# Patient Record
Sex: FEMALE | Race: WHITE | Employment: OTHER | URBAN - METROPOLITAN AREA
[De-identification: names, ages, dates, MRNs, and addresses within clinical notes are randomized per-mention and may not be internally consistent; named-entity substitution may affect disease eponyms.]

---

## 2017-11-01 ENCOUNTER — HOSPITAL ENCOUNTER (OUTPATIENT)
Dept: DIABETES SERVICES | Age: 72
Discharge: HOME | End: 2017-11-01
Attending: FAMILY MEDICINE
Payer: MEDICARE

## 2017-11-01 DIAGNOSIS — E11.9 DIABETES MELLITUS WITHOUT COMPLICATION (HCC): ICD-10-CM

## 2017-11-01 PROCEDURE — G0109 DIAB MANAGE TRN IND/GROUP: HCPCS | Performed by: DIETITIAN, REGISTERED

## 2017-11-15 ENCOUNTER — HOSPITAL ENCOUNTER (OUTPATIENT)
Dept: DIABETES SERVICES | Age: 72
Discharge: HOME | End: 2017-11-15
Payer: MEDICARE

## 2017-11-15 DIAGNOSIS — E11.9 DIABETES MELLITUS WITHOUT COMPLICATION (HCC): ICD-10-CM

## 2017-11-15 PROCEDURE — G0109 DIAB MANAGE TRN IND/GROUP: HCPCS | Performed by: DIETITIAN, REGISTERED

## 2017-12-19 ENCOUNTER — HOSPITAL ENCOUNTER (OUTPATIENT)
Dept: DIABETES SERVICES | Age: 72
Discharge: HOME | End: 2017-12-19
Payer: MEDICARE

## 2017-12-19 DIAGNOSIS — E11.9 DIABETES MELLITUS WITHOUT COMPLICATION (HCC): ICD-10-CM

## 2017-12-19 PROCEDURE — G0109 DIAB MANAGE TRN IND/GROUP: HCPCS | Performed by: DIETITIAN, REGISTERED

## 2019-08-28 ENCOUNTER — HOSPITAL ENCOUNTER (OUTPATIENT)
Dept: MRI IMAGING | Age: 74
Discharge: HOME | End: 2019-08-28
Attending: FAMILY MEDICINE
Payer: MEDICARE

## 2019-08-28 DIAGNOSIS — R51.9 NONINTRACTABLE EPISODIC HEADACHE, UNSPECIFIED HEADACHE TYPE: ICD-10-CM

## 2019-08-28 PROCEDURE — 70551 MRI BRAIN STEM W/O DYE: CPT

## 2019-09-05 ENCOUNTER — HOSPITAL ENCOUNTER (OUTPATIENT)
Dept: CT IMAGING | Age: 74
Discharge: HOME | End: 2019-09-05
Attending: FAMILY MEDICINE
Payer: MEDICARE

## 2019-09-05 DIAGNOSIS — Z87.891 PERSONAL HISTORY OF TOBACCO USE, PRESENTING HAZARDS TO HEALTH: ICD-10-CM

## 2019-09-05 PROCEDURE — G0297 LDCT FOR LUNG CA SCREEN: HCPCS

## 2019-11-19 ENCOUNTER — HOSPITAL ENCOUNTER (OUTPATIENT)
Dept: VASCULAR SURGERY | Age: 74
Discharge: HOME | End: 2019-11-19
Attending: INTERNAL MEDICINE
Payer: MEDICARE

## 2019-11-19 DIAGNOSIS — E08.40 DIABETES MELLITUS DUE TO UNDERLYING CONDITION WITH DIABETIC NEUROPATHY (HCC): ICD-10-CM

## 2019-11-19 DIAGNOSIS — R23.2 FLUSHING: ICD-10-CM

## 2019-11-19 DIAGNOSIS — I70.213 ATHEROSCLEROSIS OF NATIVE ARTERIES OF EXTREMITIES WITH INTERMITTENT CLAUDICATION, BILATERAL LEGS (HCC): ICD-10-CM

## 2019-11-19 LAB
LEFT CCA DIST DIAS: 21.7 CM/S
LEFT CCA DIST SYS: 95.8 CM/S
LEFT CCA PROX DIAS: 18.3 CM/S
LEFT CCA PROX SYS: 113.1 CM/S
LEFT ECA DIAS: 14.8 CM/S
LEFT ECA SYS: 114.8 CM/S
LEFT ICA DIST DIAS: 21.7 CM/S
LEFT ICA DIST SYS: 106.6 CM/S
LEFT ICA MID DIAS: 21.2 CM/S
LEFT ICA MID SYS: 103.4 CM/S
LEFT ICA PROX DIAS: 25.2 CM/S
LEFT ICA PROX SYS: 85.5 CM/S
LEFT ICA/CCA SYS: 1.1
LEFT VERTEBRAL DIAS: 12.7 CM/S
LEFT VERTEBRAL SYS: 62.2 CM/S
RIGHT CCA DIST DIAS: 19.2 CM/S
RIGHT CCA DIST SYS: 90.4 CM/S
RIGHT CCA PROX DIAS: 13.7 CM/S
RIGHT CCA PROX SYS: 106 CM/S
RIGHT ECA DIAS: 10.2 CM/S
RIGHT ECA SYS: 112.5 CM/S
RIGHT ICA DIST DIAS: 16.2 CM/S
RIGHT ICA DIST SYS: 77.7 CM/S
RIGHT ICA MID DIAS: 24.1 CM/S
RIGHT ICA MID SYS: 79.3 CM/S
RIGHT ICA PROX DIAS: 16.7 CM/S
RIGHT ICA PROX SYS: 73.2 CM/S
RIGHT ICA/CCA SYS: 0.9
RIGHT VERTEBRAL DIAS: 11 CM/S
RIGHT VERTEBRAL SYS: 67 CM/S

## 2019-11-19 PROCEDURE — 93880 EXTRACRANIAL BILAT STUDY: CPT

## 2019-12-12 ENCOUNTER — APPOINTMENT (OUTPATIENT)
Dept: GENERAL RADIOLOGY | Age: 74
End: 2019-12-12
Attending: EMERGENCY MEDICINE
Payer: MEDICARE

## 2019-12-12 ENCOUNTER — HOSPITAL ENCOUNTER (EMERGENCY)
Age: 74
Discharge: HOME | End: 2019-12-12
Attending: EMERGENCY MEDICINE
Payer: MEDICARE

## 2019-12-12 VITALS
SYSTOLIC BLOOD PRESSURE: 138 MMHG | BODY MASS INDEX: 31.21 KG/M2 | WEIGHT: 170.64 LBS | HEART RATE: 80 BPM | RESPIRATION RATE: 21 BRPM | TEMPERATURE: 98 F | DIASTOLIC BLOOD PRESSURE: 68 MMHG | OXYGEN SATURATION: 99 %

## 2019-12-12 DIAGNOSIS — R07.89 CHEST WALL PAIN: Primary | ICD-10-CM

## 2019-12-12 LAB
ALBUMIN SERPL-MCNC: 3.6 G/DL (ref 3.5–5)
ALBUMIN/GLOB SERPL: 1.1 {RATIO} (ref 1.1–2.2)
ALP SERPL-CCNC: 156 U/L (ref 45–117)
ALT SERPL-CCNC: 24 U/L (ref 12–78)
ANION GAP SERPL CALC-SCNC: 12 MMOL/L (ref 5–15)
APPEARANCE UR: CLEAR
AST SERPL-CCNC: 13 U/L (ref 15–37)
BACTERIA URNS QL MICRO: NEGATIVE /HPF
BASOPHILS # BLD: 0 K/UL (ref 0–0.1)
BASOPHILS NFR BLD: 0 % (ref 0–1)
BILIRUB SERPL-MCNC: 0.2 MG/DL (ref 0.2–1)
BILIRUB UR QL: NEGATIVE
BUN SERPL-MCNC: 13 MG/DL (ref 6–20)
BUN/CREAT SERPL: 21 (ref 12–20)
CALCIUM SERPL-MCNC: 8.9 MG/DL (ref 8.5–10.1)
CHLORIDE SERPL-SCNC: 92 MMOL/L (ref 97–108)
CK SERPL-CCNC: 69 U/L (ref 26–192)
CO2 SERPL-SCNC: 25 MMOL/L (ref 21–32)
COLOR UR: ABNORMAL
COMMENT, HOLDF: NORMAL
CREAT SERPL-MCNC: 0.61 MG/DL (ref 0.55–1.02)
DIFFERENTIAL METHOD BLD: ABNORMAL
EOSINOPHIL # BLD: 0 K/UL (ref 0–0.4)
EOSINOPHIL NFR BLD: 0 % (ref 0–7)
EPITH CASTS URNS QL MICRO: ABNORMAL /LPF
ERYTHROCYTE [DISTWIDTH] IN BLOOD BY AUTOMATED COUNT: 18.5 % (ref 11.5–14.5)
GLOBULIN SER CALC-MCNC: 3.4 G/DL (ref 2–4)
GLUCOSE BLD STRIP.AUTO-MCNC: 189 MG/DL (ref 65–100)
GLUCOSE SERPL-MCNC: 165 MG/DL (ref 65–100)
GLUCOSE UR STRIP.AUTO-MCNC: NEGATIVE MG/DL
HCT VFR BLD AUTO: 28.8 % (ref 35–47)
HGB BLD-MCNC: 8.3 G/DL (ref 11.5–16)
HGB UR QL STRIP: NEGATIVE
IMM GRANULOCYTES # BLD AUTO: 0 K/UL (ref 0–0.04)
IMM GRANULOCYTES NFR BLD AUTO: 0 % (ref 0–0.5)
KETONES UR QL STRIP.AUTO: ABNORMAL MG/DL
LEUKOCYTE ESTERASE UR QL STRIP.AUTO: NEGATIVE
LYMPHOCYTES # BLD: 1 K/UL (ref 0.8–3.5)
LYMPHOCYTES NFR BLD: 10 % (ref 12–49)
MCH RBC QN AUTO: 19.7 PG (ref 26–34)
MCHC RBC AUTO-ENTMCNC: 28.8 G/DL (ref 30–36.5)
MCV RBC AUTO: 68.2 FL (ref 80–99)
MONOCYTES # BLD: 0.4 K/UL (ref 0–1)
MONOCYTES NFR BLD: 4 % (ref 5–13)
NEUTS SEG # BLD: 8.7 K/UL (ref 1.8–8)
NEUTS SEG NFR BLD: 86 % (ref 32–75)
NITRITE UR QL STRIP.AUTO: NEGATIVE
NRBC # BLD: 0 K/UL (ref 0–0.01)
NRBC BLD-RTO: 0 PER 100 WBC
PH UR STRIP: 7.5 [PH] (ref 5–8)
PLATELET # BLD AUTO: 273 K/UL (ref 150–400)
PLATELET COMMENTS,PCOM: ABNORMAL
PMV BLD AUTO: 10.3 FL (ref 8.9–12.9)
POTASSIUM SERPL-SCNC: 4 MMOL/L (ref 3.5–5.1)
PROT SERPL-MCNC: 7 G/DL (ref 6.4–8.2)
PROT UR STRIP-MCNC: NEGATIVE MG/DL
RBC # BLD AUTO: 4.22 M/UL (ref 3.8–5.2)
RBC #/AREA URNS HPF: ABNORMAL /HPF (ref 0–5)
RBC MORPH BLD: ABNORMAL
SAMPLES BEING HELD,HOLD: NORMAL
SERVICE CMNT-IMP: ABNORMAL
SODIUM SERPL-SCNC: 129 MMOL/L (ref 136–145)
SP GR UR REFRACTOMETRY: 1.01 (ref 1–1.03)
TROPONIN I SERPL-MCNC: <0.05 NG/ML
UA: UC IF INDICATED,UAUC: ABNORMAL
UROBILINOGEN UR QL STRIP.AUTO: 0.2 EU/DL (ref 0.2–1)
WBC # BLD AUTO: 10.1 K/UL (ref 3.6–11)
WBC URNS QL MICRO: ABNORMAL /HPF (ref 0–4)

## 2019-12-12 PROCEDURE — 85025 COMPLETE CBC W/AUTO DIFF WBC: CPT

## 2019-12-12 PROCEDURE — 80053 COMPREHEN METABOLIC PANEL: CPT

## 2019-12-12 PROCEDURE — 96374 THER/PROPH/DIAG INJ IV PUSH: CPT

## 2019-12-12 PROCEDURE — 82962 GLUCOSE BLOOD TEST: CPT

## 2019-12-12 PROCEDURE — 74011250637 HC RX REV CODE- 250/637: Performed by: EMERGENCY MEDICINE

## 2019-12-12 PROCEDURE — 74011000250 HC RX REV CODE- 250: Performed by: EMERGENCY MEDICINE

## 2019-12-12 PROCEDURE — 81001 URINALYSIS AUTO W/SCOPE: CPT

## 2019-12-12 PROCEDURE — 74011250636 HC RX REV CODE- 250/636: Performed by: EMERGENCY MEDICINE

## 2019-12-12 PROCEDURE — 84484 ASSAY OF TROPONIN QUANT: CPT

## 2019-12-12 PROCEDURE — 99285 EMERGENCY DEPT VISIT HI MDM: CPT

## 2019-12-12 PROCEDURE — 71046 X-RAY EXAM CHEST 2 VIEWS: CPT

## 2019-12-12 PROCEDURE — 93005 ELECTROCARDIOGRAM TRACING: CPT

## 2019-12-12 PROCEDURE — 36415 COLL VENOUS BLD VENIPUNCTURE: CPT

## 2019-12-12 PROCEDURE — 82550 ASSAY OF CK (CPK): CPT

## 2019-12-12 RX ORDER — LIDOCAINE 50 MG/G
1 PATCH TOPICAL EVERY 24 HOURS
Status: DISCONTINUED | OUTPATIENT
Start: 2019-12-12 | End: 2019-12-12 | Stop reason: HOSPADM

## 2019-12-12 RX ORDER — KETOROLAC TROMETHAMINE 30 MG/ML
15 INJECTION, SOLUTION INTRAMUSCULAR; INTRAVENOUS
Status: COMPLETED | OUTPATIENT
Start: 2019-12-12 | End: 2019-12-12

## 2019-12-12 RX ORDER — LIDOCAINE 50 MG/G
PATCH TOPICAL
Qty: 10 EACH | Refills: 0 | Status: SHIPPED | OUTPATIENT
Start: 2019-12-12 | End: 2020-09-04

## 2019-12-12 RX ORDER — OXYCODONE AND ACETAMINOPHEN 5; 325 MG/1; MG/1
1 TABLET ORAL
Status: COMPLETED | OUTPATIENT
Start: 2019-12-12 | End: 2019-12-12

## 2019-12-12 RX ADMIN — OXYCODONE HYDROCHLORIDE AND ACETAMINOPHEN 1 TABLET: 5; 325 TABLET ORAL at 16:33

## 2019-12-12 RX ADMIN — KETOROLAC TROMETHAMINE 15 MG: 30 INJECTION, SOLUTION INTRAMUSCULAR at 14:52

## 2019-12-12 NOTE — ED TRIAGE NOTES
TRIAGE NOTE: Sudden onset of dizziness, laid herself down on the floor, and couldn't get up for 3.5 hours  Has sharp pain on left chest, worse with movement, states didn't fall or hurt herself earlier.

## 2019-12-12 NOTE — DISCHARGE INSTRUCTIONS

## 2019-12-12 NOTE — ED PROVIDER NOTES
57-year-old female history of bipolar disorder, diabetes, presenting to the ED for evaluation of severe left-sided chest and rib pain. Patient reports she started having pain under her left breast and rib cage earlier this morning. She began to feel very nauseated and thought that she might pass out, so she laid herself on the ground which she thought would be cold and percussion. She was unable to get back up from the ground secondary to pain and remained there for 3 hours. Her left-sided chest pain is sharp, worse with movement and palpation, slightly worse with inspiration. She denies any shortness of breath. She's had no syncope. sshe denies any current nausea or diaphoresis. She has no cardiac history he denies any history of recent trauma or falls.            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    Carotid bruit 4/12/2011    Chest discomfort 4/12/2011    Diabetes (HCC)     GERD (gastroesophageal reflux disease)     Other ill-defined conditions(799.89)     celiac disease    Psychiatric disorder     depression/anxiety    Pure hypercholesterolemia 4/12/2011       Past Surgical History:   Procedure Laterality Date    CARDIAC SURG PROCEDURE UNLIST      cardiac cath - normal    HX APPENDECTOMY      HX GYN      tubal ligation    HX GYN      cyst removed from ovary at same time as appendectomy    HX HEENT      skin cancer removed from ear    HX HEENT      sinus surgery    HX HEENT      bilateral cataracts removed    HX ORTHOPAEDIC      back    HX OTHER SURGICAL      colonoscopy x 1 - hx colon polyps    HX WOOD AND BSO           Family History:   Problem Relation Age of Onset    Breast Cancer Sister     Colon Polyps Other         2 cousins       Social History     Socioeconomic History    Marital status:      Spouse name: Not on file    Number of children: Not on file    Years of education: Not on file    Highest education level: Not on file   Occupational History    Not on file   Social Needs    Financial resource strain: Not on file    Food insecurity:     Worry: Not on file     Inability: Not on file    Transportation needs:     Medical: Not on file     Non-medical: Not on file   Tobacco Use    Smoking status: Current Every Day Smoker     Packs/day: 0.25     Years: 30.00     Pack years: 7.50    Smokeless tobacco: Never Used    Tobacco comment: current cigarette smoker   Substance and Sexual Activity    Alcohol use: Yes     Comment: occasional    Drug use: Not on file    Sexual activity: Not on file   Lifestyle    Physical activity:     Days per week: Not on file     Minutes per session: Not on file    Stress: Not on file   Relationships    Social connections:     Talks on phone: Not on file     Gets together: Not on file     Attends [employer] service: Not on file     Active member of club or organization: Not on file     Attends meetings of clubs or organizations: Not on file     Relationship status: Not on file    Intimate partner violence:     Fear of current or ex partner: Not on file     Emotionally abused: Not on file     Physically abused: Not on file     Forced sexual activity: Not on file   Other Topics Concern    Not on file   Social History Narrative    Not on file         ALLERGIES: Codeine; Lactose intolerance [lactase]; Morphine; and Sulfa (sulfonamide antibiotics)    Review of Systems   Constitutional: Negative for chills and fever. HENT: Negative for congestion. Eyes: Negative for visual disturbance. Respiratory: Negative for shortness of breath. Cardiovascular: Positive for chest pain. Negative for palpitations and leg swelling. Gastrointestinal: Positive for nausea. Negative for abdominal pain and vomiting. Genitourinary: Negative for dysuria and hematuria. Musculoskeletal: Negative for back pain. Skin: Negative for rash.    Allergic/Immunologic: Negative for immunocompromised state. Neurological: Positive for light-headedness. Negative for syncope, weakness and headaches. Psychiatric/Behavioral: Negative for confusion. Vitals:    12/12/19 1432   Weight: 77.4 kg (170 lb 10.2 oz)            Physical Exam  Vitals signs and nursing note reviewed. Constitutional:       General: She is not in acute distress. Appearance: She is well-developed. HENT:      Head: Normocephalic and atraumatic. Eyes:      General: No scleral icterus. Neck:      Trachea: No tracheal deviation. Cardiovascular:      Rate and Rhythm: Normal rate and regular rhythm. Heart sounds: Normal heart sounds. Pulmonary:      Effort: Pulmonary effort is normal. No respiratory distress. Breath sounds: Normal breath sounds. Chest:       Abdominal:      General: There is no distension. Palpations: Abdomen is soft. Tenderness: There is no tenderness. Musculoskeletal: Normal range of motion. Skin:     General: Skin is warm and dry. Neurological:      Mental Status: She is alert and oriented to person, place, and time. Cranial Nerves: No cranial nerve deficit. MDM  Number of Diagnoses or Management Options  Diagnosis management comments: 66-year-old female history of bipolar disorder and diabetes, presenting with left-sided chest pain, atypical in nature. Also a 3 hour episode where she was started on the ground. We'll evaluate for ACS with EKG, troponin. HEART score 3, low risk. Chest x-ray to assess for rib fracture, pneumothorax or possible pleural effusion as cause of left-sided pain. There is no abdominal pain, as will defer  Those labs at this time. Amount and/or Complexity of Data Reviewed  Clinical lab tests: ordered  Tests in the radiology section of CPT®: ordered        ED EKG interpretation:  Rhythm: normal sinus rhythm; and regular .  Rate (approx.): 81; Axis: normal; P wave: normal; QRS interval: normal ; ST/T wave: Q waves in V1, V2; in  Lead: V1 qwave and V2 qwave; Other findings: ischemia V1, V2, likely subacute, no prior for comparison. EKG documented by Marielle Westfall MD, as interpreted by Lizbeth Harmon MD, ED MD.    Recent Results (from the past 12 hour(s))   EKG, 12 LEAD, INITIAL    Collection Time: 12/12/19  2:35 PM   Result Value Ref Range    Ventricular Rate 81 BPM    Atrial Rate 81 BPM    P-R Interval 168 ms    QRS Duration 74 ms    Q-T Interval 384 ms    QTC Calculation (Bezet) 446 ms    Calculated P Axis 49 degrees    Calculated R Axis 20 degrees    Calculated T Axis 71 degrees    Diagnosis       Normal sinus rhythm  Possible Left atrial enlargement  Septal infarct , age undetermined  Abnormal ECG  No previous ECGs available     SAMPLES BEING HELD    Collection Time: 12/12/19  2:43 PM   Result Value Ref Range    SAMPLES BEING HELD 1RD,1BLU     COMMENT        Add-on orders for these samples will be processed based on acceptable specimen integrity and analyte stability, which may vary by analyte. TROPONIN I    Collection Time: 12/12/19  2:43 PM   Result Value Ref Range    Troponin-I, Qt. <0.05 <4.96 ng/mL   METABOLIC PANEL, COMPREHENSIVE    Collection Time: 12/12/19  2:43 PM   Result Value Ref Range    Sodium 129 (L) 136 - 145 mmol/L    Potassium 4.0 3.5 - 5.1 mmol/L    Chloride 92 (L) 97 - 108 mmol/L    CO2 25 21 - 32 mmol/L    Anion gap 12 5 - 15 mmol/L    Glucose 165 (H) 65 - 100 mg/dL    BUN 13 6 - 20 MG/DL    Creatinine 0.61 0.55 - 1.02 MG/DL    BUN/Creatinine ratio 21 (H) 12 - 20      GFR est AA >60 >60 ml/min/1.73m2    GFR est non-AA >60 >60 ml/min/1.73m2    Calcium 8.9 8.5 - 10.1 MG/DL    Bilirubin, total 0.2 0.2 - 1.0 MG/DL    ALT (SGPT) 24 12 - 78 U/L    AST (SGOT) 13 (L) 15 - 37 U/L    Alk.  phosphatase 156 (H) 45 - 117 U/L    Protein, total 7.0 6.4 - 8.2 g/dL    Albumin 3.6 3.5 - 5.0 g/dL    Globulin 3.4 2.0 - 4.0 g/dL    A-G Ratio 1.1 1.1 - 2.2     CBC WITH AUTOMATED DIFF    Collection Time: 12/12/19  2:43 PM Result Value Ref Range    WBC 10.1 3.6 - 11.0 K/uL    RBC 4.22 3.80 - 5.20 M/uL    HGB 8.3 (L) 11.5 - 16.0 g/dL    HCT 28.8 (L) 35.0 - 47.0 %    MCV 68.2 (L) 80.0 - 99.0 FL    MCH 19.7 (L) 26.0 - 34.0 PG    MCHC 28.8 (L) 30.0 - 36.5 g/dL    RDW 18.5 (H) 11.5 - 14.5 %    PLATELET 624 351 - 890 K/uL    MPV 10.3 8.9 - 12.9 FL    NRBC 0.0 0  WBC    ABSOLUTE NRBC 0.00 0.00 - 0.01 K/uL    NEUTROPHILS 86 (H) 32 - 75 %    LYMPHOCYTES 10 (L) 12 - 49 %    MONOCYTES 4 (L) 5 - 13 %    EOSINOPHILS 0 0 - 7 %    BASOPHILS 0 0 - 1 %    IMMATURE GRANULOCYTES 0 0.0 - 0.5 %    ABS. NEUTROPHILS 8.7 (H) 1.8 - 8.0 K/UL    ABS. LYMPHOCYTES 1.0 0.8 - 3.5 K/UL    ABS. MONOCYTES 0.4 0.0 - 1.0 K/UL    ABS. EOSINOPHILS 0.0 0.0 - 0.4 K/UL    ABS. BASOPHILS 0.0 0.0 - 0.1 K/UL    ABS. IMM. GRANS. 0.0 0.00 - 0.04 K/UL    DF SMEAR SCANNED      PLATELET COMMENTS Large Platelets      RBC COMMENTS ANISOCYTOSIS  2+        RBC COMMENTS HYPOCHROMIA  3+        RBC COMMENTS MICROCYTOSIS  2+        RBC COMMENTS OVALOCYTES  PRESENT       CK    Collection Time: 12/12/19  2:43 PM   Result Value Ref Range    CK 69 26 - 192 U/L   GLUCOSE, POC    Collection Time: 12/12/19  2:52 PM   Result Value Ref Range    Glucose (POC) 189 (H) 65 - 100 mg/dL    Performed by Yessy Stewart      CT Results  (Last 48 hours)    None        PFT Results  (Last 48 hours)    None        Echo Results  (Last 48 hours)    None        CXR Results  (Last 48 hours)               12/12/19 1512  XR CHEST PA LAT Final result    Impression:  IMPRESSION: Low lung volumes with mild bibasilar opacities likely representing   atelectasis. Narrative:  EXAM:  XR CHEST PA LAT       INDICATION: Left-sided chest and rib pain. COMPARISON: 7/16/2009. TECHNIQUE: Frontal and lateral chest views       FINDINGS: Cardiac monitoring wires overlie the thorax. The cardiomediastinal   contours are within normal limits. There are low lung volumes with mild   bibasilar opacities. There is no pleural effusion or pneumothorax. The bones and   upper abdomen are stable. VENOUS DOPPLER results  (Last 48 hours)    None        4:19 PM  Labs and imaging unremarkable. Pt requesting percocet for pain. Will administer and discharge home. Procedures     4:53 PM  Pain is improved with Percocet. Patient plans to stay with her daughter tonight and understands she can return for any reason as needed. I've advised her to take ibuprofen at home for pain. 5:47 PM  Daughter is at bedside. We had an extensive conversation on why narcotics and benzos are contraindicated in geriatric patients due to fall risks. Will apply lidocaine patch. Daughter has 4% patches at home. Will follow-up with PCP/GI.      Signed By: Jennifer Landis MD     December 12, 2019

## 2019-12-14 LAB
ATRIAL RATE: 81 BPM
CALCULATED P AXIS, ECG09: 49 DEGREES
CALCULATED R AXIS, ECG10: 20 DEGREES
CALCULATED T AXIS, ECG11: 71 DEGREES
DIAGNOSIS, 93000: NORMAL
P-R INTERVAL, ECG05: 168 MS
Q-T INTERVAL, ECG07: 384 MS
QRS DURATION, ECG06: 74 MS
QTC CALCULATION (BEZET), ECG08: 446 MS
VENTRICULAR RATE, ECG03: 81 BPM

## 2020-09-04 ENCOUNTER — HOSPITAL ENCOUNTER (OUTPATIENT)
Age: 75
Setting detail: OBSERVATION
Discharge: HOME | End: 2020-09-05
Attending: STUDENT IN AN ORGANIZED HEALTH CARE EDUCATION/TRAINING PROGRAM | Admitting: FAMILY MEDICINE
Payer: MEDICARE

## 2020-09-04 DIAGNOSIS — D64.9 SYMPTOMATIC ANEMIA: Primary | ICD-10-CM

## 2020-09-04 DIAGNOSIS — E87.1 HYPONATREMIA: ICD-10-CM

## 2020-09-04 DIAGNOSIS — D50.9 MICROCYTIC ANEMIA: ICD-10-CM

## 2020-09-04 LAB
ALBUMIN SERPL-MCNC: 3.3 G/DL (ref 3.5–5)
ALBUMIN/GLOB SERPL: 0.9 {RATIO} (ref 1.1–2.2)
ALP SERPL-CCNC: 151 U/L (ref 45–117)
ALT SERPL-CCNC: 21 U/L (ref 12–78)
ANION GAP SERPL CALC-SCNC: 10 MMOL/L (ref 5–15)
AST SERPL-CCNC: 23 U/L (ref 15–37)
ATRIAL RATE: 80 BPM
BASOPHILS # BLD: 0 K/UL (ref 0–0.1)
BASOPHILS NFR BLD: 0 % (ref 0–1)
BILIRUB SERPL-MCNC: 0.2 MG/DL (ref 0.2–1)
BUN SERPL-MCNC: 11 MG/DL (ref 6–20)
BUN/CREAT SERPL: 14 (ref 12–20)
CALCIUM SERPL-MCNC: 9.1 MG/DL (ref 8.5–10.1)
CALCULATED P AXIS, ECG09: 52 DEGREES
CALCULATED R AXIS, ECG10: 23 DEGREES
CALCULATED T AXIS, ECG11: 71 DEGREES
CHLORIDE SERPL-SCNC: 92 MMOL/L (ref 97–108)
CO2 SERPL-SCNC: 26 MMOL/L (ref 21–32)
CREAT SERPL-MCNC: 0.8 MG/DL (ref 0.55–1.02)
D DIMER PPP FEU-MCNC: 0.35 MG/L FEU (ref 0–0.65)
DIAGNOSIS, 93000: NORMAL
DIFFERENTIAL METHOD BLD: ABNORMAL
EOSINOPHIL # BLD: 0 K/UL (ref 0–0.4)
EOSINOPHIL NFR BLD: 0 % (ref 0–7)
ERYTHROCYTE [DISTWIDTH] IN BLOOD BY AUTOMATED COUNT: 20.1 % (ref 11.5–14.5)
EST. AVERAGE GLUCOSE BLD GHB EST-MCNC: 174 MG/DL
GLOBULIN SER CALC-MCNC: 3.6 G/DL (ref 2–4)
GLUCOSE BLD STRIP.AUTO-MCNC: 118 MG/DL (ref 65–100)
GLUCOSE BLD STRIP.AUTO-MCNC: 136 MG/DL (ref 65–100)
GLUCOSE SERPL-MCNC: 152 MG/DL (ref 65–100)
HBA1C MFR BLD: 7.7 % (ref 4–5.6)
HCT VFR BLD AUTO: 24.9 % (ref 35–47)
HEMOCCULT STL QL: NEGATIVE
HGB BLD-MCNC: 6 G/DL (ref 11.5–16)
HGB BLD-MCNC: 6.5 G/DL (ref 11.5–16)
IMM GRANULOCYTES # BLD AUTO: 0.1 K/UL (ref 0–0.04)
IMM GRANULOCYTES NFR BLD AUTO: 1 % (ref 0–0.5)
LYMPHOCYTES # BLD: 1.6 K/UL (ref 0.8–3.5)
LYMPHOCYTES NFR BLD: 18 % (ref 12–49)
MCH RBC QN AUTO: 15.8 PG (ref 26–34)
MCHC RBC AUTO-ENTMCNC: 26.1 G/DL (ref 30–36.5)
MCV RBC AUTO: 60.4 FL (ref 80–99)
MONOCYTES # BLD: 0.7 K/UL (ref 0–1)
MONOCYTES NFR BLD: 8 % (ref 5–13)
NEUTS SEG # BLD: 6.6 K/UL (ref 1.8–8)
NEUTS SEG NFR BLD: 73 % (ref 32–75)
NRBC # BLD: 0 K/UL (ref 0–0.01)
NRBC BLD-RTO: 0 PER 100 WBC
OSMOLALITY SERPL: 268 MOSM/KG H2O
P-R INTERVAL, ECG05: 158 MS
PLATELET # BLD AUTO: 302 K/UL (ref 150–400)
PLATELET COMMENTS,PCOM: ABNORMAL
PMV BLD AUTO: 10 FL (ref 8.9–12.9)
POTASSIUM SERPL-SCNC: 3.7 MMOL/L (ref 3.5–5.1)
PROT SERPL-MCNC: 6.9 G/DL (ref 6.4–8.2)
Q-T INTERVAL, ECG07: 358 MS
QRS DURATION, ECG06: 68 MS
QTC CALCULATION (BEZET), ECG08: 412 MS
RBC # BLD AUTO: 4.12 M/UL (ref 3.8–5.2)
RBC MORPH BLD: ABNORMAL
SERVICE CMNT-IMP: ABNORMAL
SERVICE CMNT-IMP: ABNORMAL
SODIUM SERPL-SCNC: 128 MMOL/L (ref 136–145)
TROPONIN I SERPL-MCNC: <0.05 NG/ML
VENTRICULAR RATE, ECG03: 80 BPM
VIT B12 SERPL-MCNC: 479 PG/ML (ref 193–986)
WBC # BLD AUTO: 9 K/UL (ref 3.6–11)

## 2020-09-04 PROCEDURE — 36415 COLL VENOUS BLD VENIPUNCTURE: CPT

## 2020-09-04 PROCEDURE — 36430 TRANSFUSION BLD/BLD COMPNT: CPT

## 2020-09-04 PROCEDURE — 85018 HEMOGLOBIN: CPT

## 2020-09-04 PROCEDURE — 83036 HEMOGLOBIN GLYCOSYLATED A1C: CPT

## 2020-09-04 PROCEDURE — 99284 EMERGENCY DEPT VISIT MOD MDM: CPT

## 2020-09-04 PROCEDURE — P9016 RBC LEUKOCYTES REDUCED: HCPCS

## 2020-09-04 PROCEDURE — 82962 GLUCOSE BLOOD TEST: CPT

## 2020-09-04 PROCEDURE — 84484 ASSAY OF TROPONIN QUANT: CPT

## 2020-09-04 PROCEDURE — 82272 OCCULT BLD FECES 1-3 TESTS: CPT

## 2020-09-04 PROCEDURE — 82607 VITAMIN B-12: CPT

## 2020-09-04 PROCEDURE — C9113 INJ PANTOPRAZOLE SODIUM, VIA: HCPCS | Performed by: FAMILY MEDICINE

## 2020-09-04 PROCEDURE — 99218 HC RM OBSERVATION: CPT

## 2020-09-04 PROCEDURE — 83930 ASSAY OF BLOOD OSMOLALITY: CPT

## 2020-09-04 PROCEDURE — 85379 FIBRIN DEGRADATION QUANT: CPT

## 2020-09-04 PROCEDURE — 80053 COMPREHEN METABOLIC PANEL: CPT

## 2020-09-04 PROCEDURE — 86923 COMPATIBILITY TEST ELECTRIC: CPT

## 2020-09-04 PROCEDURE — 85025 COMPLETE CBC W/AUTO DIFF WBC: CPT

## 2020-09-04 PROCEDURE — 74011250636 HC RX REV CODE- 250/636: Performed by: FAMILY MEDICINE

## 2020-09-04 PROCEDURE — 93005 ELECTROCARDIOGRAM TRACING: CPT

## 2020-09-04 PROCEDURE — 96374 THER/PROPH/DIAG INJ IV PUSH: CPT

## 2020-09-04 PROCEDURE — 74011000250 HC RX REV CODE- 250: Performed by: FAMILY MEDICINE

## 2020-09-04 PROCEDURE — 86900 BLOOD TYPING SEROLOGIC ABO: CPT

## 2020-09-04 RX ORDER — SODIUM CHLORIDE 9 MG/ML
75 INJECTION, SOLUTION INTRAVENOUS CONTINUOUS
Status: DISCONTINUED | OUTPATIENT
Start: 2020-09-04 | End: 2020-09-05 | Stop reason: HOSPADM

## 2020-09-04 RX ORDER — METFORMIN HYDROCHLORIDE 500 MG/1
500 TABLET ORAL 2 TIMES DAILY WITH MEALS
COMMUNITY

## 2020-09-04 RX ORDER — SODIUM CHLORIDE 0.9 % (FLUSH) 0.9 %
5-40 SYRINGE (ML) INJECTION AS NEEDED
Status: DISCONTINUED | OUTPATIENT
Start: 2020-09-04 | End: 2020-09-05 | Stop reason: HOSPADM

## 2020-09-04 RX ORDER — HYDROCHLOROTHIAZIDE 25 MG/1
25 TABLET ORAL DAILY
COMMUNITY

## 2020-09-04 RX ORDER — UREA 10 %
100 LOTION (ML) TOPICAL DAILY
COMMUNITY

## 2020-09-04 RX ORDER — MAGNESIUM SULFATE 100 %
4 CRYSTALS MISCELLANEOUS AS NEEDED
Status: DISCONTINUED | OUTPATIENT
Start: 2020-09-04 | End: 2020-09-05 | Stop reason: HOSPADM

## 2020-09-04 RX ORDER — DEXTROSE MONOHYDRATE 100 MG/ML
0-250 INJECTION, SOLUTION INTRAVENOUS AS NEEDED
Status: DISCONTINUED | OUTPATIENT
Start: 2020-09-04 | End: 2020-09-05 | Stop reason: HOSPADM

## 2020-09-04 RX ORDER — CALCIUM CARBONATE/VITAMIN D3 600 MG-125
TABLET ORAL
COMMUNITY

## 2020-09-04 RX ORDER — GLIPIZIDE 5 MG/1
5 TABLET, FILM COATED, EXTENDED RELEASE ORAL DAILY
COMMUNITY

## 2020-09-04 RX ORDER — SODIUM CHLORIDE 9 MG/ML
250 INJECTION, SOLUTION INTRAVENOUS AS NEEDED
Status: DISCONTINUED | OUTPATIENT
Start: 2020-09-04 | End: 2020-09-05 | Stop reason: HOSPADM

## 2020-09-04 RX ORDER — SODIUM CHLORIDE 0.9 % (FLUSH) 0.9 %
5-40 SYRINGE (ML) INJECTION EVERY 8 HOURS
Status: DISCONTINUED | OUTPATIENT
Start: 2020-09-04 | End: 2020-09-05 | Stop reason: HOSPADM

## 2020-09-04 RX ORDER — GABAPENTIN 300 MG/1
300 CAPSULE ORAL 3 TIMES DAILY
COMMUNITY

## 2020-09-04 RX ORDER — EZETIMIBE 10 MG/1
10 TABLET ORAL DAILY
COMMUNITY
End: 2021-09-15

## 2020-09-04 RX ORDER — GLUCOSAMINE/CHONDR SU A SOD 750-600 MG
2500 TABLET ORAL DAILY
COMMUNITY

## 2020-09-04 RX ORDER — NYSTATIN 100000 [USP'U]/ML
1 SUSPENSION ORAL 4 TIMES DAILY
COMMUNITY

## 2020-09-04 RX ORDER — DOXEPIN HYDROCHLORIDE 150 MG/1
150 CAPSULE ORAL
COMMUNITY

## 2020-09-04 RX ORDER — INSULIN LISPRO 100 [IU]/ML
INJECTION, SOLUTION INTRAVENOUS; SUBCUTANEOUS EVERY 6 HOURS
Status: DISCONTINUED | OUTPATIENT
Start: 2020-09-04 | End: 2020-09-05 | Stop reason: HOSPADM

## 2020-09-04 RX ORDER — OXCARBAZEPINE 300 MG/1
300 TABLET, FILM COATED ORAL DAILY
COMMUNITY

## 2020-09-04 RX ORDER — ZINC GLUCONATE 10 MG
500 LOZENGE ORAL DAILY
COMMUNITY

## 2020-09-04 RX ADMIN — SODIUM CHLORIDE 75 ML/HR: 9 INJECTION, SOLUTION INTRAVENOUS at 17:45

## 2020-09-04 RX ADMIN — Medication 10 ML: at 17:46

## 2020-09-04 RX ADMIN — SODIUM CHLORIDE 40 MG: 9 INJECTION, SOLUTION INTRAMUSCULAR; INTRAVENOUS; SUBCUTANEOUS at 18:35

## 2020-09-04 RX ADMIN — Medication 10 ML: at 21:11

## 2020-09-04 NOTE — ED PROVIDER NOTES
This is a 78-year-old female with history of bipolar disorder, GERD, and diabetes who presents as a referral from her PCP due to low hemoglobin level that was discovered on lab draw 2 days ago. Reported to be 6.8 on September 2, 2020. Patient was seen that day by her PCP for a complaint of fatigue and malaise. Patient has been experiencing the symptoms for the past 5 to 6 weeks. She reports exhaustion, tiredness, dyspnea on exertion, muscle aches and pains, and dizziness. States she might of had a syncopal episode 3 weeks ago but none since then. Also reports intermittent, dark stools, last occurred 3 weeks ago. Denies ever being diagnosed with anemia, although she does report needing vitamin B12 injections occasionally and that she is a strict vegetarian. Reports 10 pound weight loss in the past 5 weeks. Also reports that she was tested for coronavirus 3 weeks ago which was negative. She takes a baby aspirin daily as well as an Aleve at night, no excessive use reported. Denies anticoagulation. Denies any reji melena, hematochezia, hematemesis, vaginal bleeding, hematuria. No other complaints today. Brief chart review reveals an ED visit in December 2019, hemoglobin at that time was 8.3. Fatigue   Associated symptoms include nausea. Pertinent negatives include no shortness of breath, no chest pain, no vomiting and no headaches.         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    Carotid bruit 4/12/2011    Chest discomfort 4/12/2011    Diabetes (HCC)     GERD (gastroesophageal reflux disease)     Other ill-defined conditions(799.89)     celiac disease    Psychiatric disorder     depression/anxiety    Pure hypercholesterolemia 4/12/2011       Past Surgical History:   Procedure Laterality Date    CARDIAC SURG PROCEDURE UNLIST      cardiac cath - normal    HX APPENDECTOMY      HX GYN      tubal ligation    HX GYN      cyst removed from ovary at same time as appendectomy    HX HEENT      skin cancer removed from ear    HX HEENT      sinus surgery    HX HEENT      bilateral cataracts removed    HX ORTHOPAEDIC      back    HX OTHER SURGICAL      colonoscopy x 1 - hx colon polyps    HX WOOD AND BSO           Family History:   Problem Relation Age of Onset    Breast Cancer Sister     Colon Polyps Other         2 cousins       Social History     Socioeconomic History    Marital status:      Spouse name: Not on file    Number of children: Not on file    Years of education: Not on file    Highest education level: Not on file   Occupational History    Not on file   Social Needs    Financial resource strain: Not on file    Food insecurity     Worry: Not on file     Inability: Not on file    Transportation needs     Medical: Not on file     Non-medical: Not on file   Tobacco Use    Smoking status: Current Every Day Smoker     Packs/day: 0.50     Years: 30.00     Pack years: 15.00    Smokeless tobacco: Never Used    Tobacco comment: current cigarette smoker   Substance and Sexual Activity    Alcohol use: Yes     Comment: occasional    Drug use: Never    Sexual activity: Not on file   Lifestyle    Physical activity     Days per week: Not on file     Minutes per session: Not on file    Stress: Not on file   Relationships    Social connections     Talks on phone: Not on file     Gets together: Not on file     Attends Sikh service: Not on file     Active member of club or organization: Not on file     Attends meetings of clubs or organizations: Not on file     Relationship status: Not on file    Intimate partner violence     Fear of current or ex partner: Not on file     Emotionally abused: Not on file     Physically abused: Not on file     Forced sexual activity: Not on file   Other Topics Concern    Not on file   Social History Narrative    Not on file         ALLERGIES: Codeine;  Lactose intolerance [lactase]; Morphine; and Sulfa (sulfonamide antibiotics)    Review of Systems   Constitutional: Positive for activity change, appetite change, fatigue and unexpected weight change. Negative for chills. Respiratory: Negative for cough and shortness of breath. Cardiovascular: Negative for chest pain. Gastrointestinal: Positive for nausea. Negative for abdominal pain, anal bleeding, blood in stool and vomiting. Genitourinary: Negative for dysuria and vaginal bleeding. Musculoskeletal: Positive for myalgias. Skin: Negative for color change. Neurological: Positive for dizziness. Negative for syncope and headaches. All other systems reviewed and are negative. Vitals:    09/04/20 1016   BP: 162/62   Pulse: 81   Resp: 16   Temp: 98.2 °F (36.8 °C)   SpO2: 100%   Weight: 74.3 kg (163 lb 12.8 oz)   Height: 5' 1\" (1.549 m)            Physical Exam  Vitals signs and nursing note reviewed. Constitutional:       General: She is not in acute distress. Appearance: She is well-developed. She is obese. HENT:      Head: Normocephalic and atraumatic. Eyes:      General: No scleral icterus. Conjunctiva/sclera: Conjunctivae normal.   Neck:      Musculoskeletal: Normal range of motion and neck supple. Cardiovascular:      Rate and Rhythm: Normal rate and regular rhythm. Pulmonary:      Effort: Pulmonary effort is normal. No respiratory distress. Abdominal:      Palpations: Abdomen is soft. Tenderness: There is no abdominal tenderness. There is no guarding. Genitourinary:     Rectum: Normal.      Comments: No BRPR or melena  Musculoskeletal:         General: No deformity. Right lower leg: No edema. Skin:     General: Skin is dry. Coloration: Skin is pale. Neurological:      Mental Status: She is alert and oriented to person, place, and time. Cranial Nerves: No dysarthria. [facility]       Procedures      Assessment and plan:  This is a 42-year-old female discovered to have severe anemia, symptoms have been present for the past 5 to 6 weeks. Unclear cause. Differential diagnosis includes occult GI bleeding, iron deficiency, anemia of chronic disease, medication adverse reaction. Plan to recheck hemoglobin level today and guaiac stool. Will likely require admission for blood transfusion and further work-up given that she is symptomatic. Perfect Serve Consult for Admission  10:52 AM    ED Room Number: SER06/06  Patient Name and age:  Lima Ortega 66 y.o.  female  Working Diagnosis:   1. Symptomatic anemia    2. Microcytic anemia    3. Hyponatremia        COVID-19 Suspicion:  no  Sepsis present:  no  Reassessment needed: no  Code Status:  Full Code  Readmission: no  Isolation Requirements:  no  Recommended Level of Care:  med/surg  Department:[facility] - [phone]  Other:  Will need pRBC transfusion, further labs. Hemodynamically stable. Guaiac negative. EKG interpretation: 11:01  Rhythm: sinus rhythm with sinus arrhythmia; and irregular. Rate (approx.): 004;  Axis: normal; Intervals: normal ; ST/T wave: non-specific changes, no STEMI, septal q waves noted age undetermined; EKG documented and interpreted by Ilda Philip MD, ED MD.

## 2020-09-04 NOTE — H&P
History & Physical    Primary Care Provider: Zonia Bloch, MD  Source of Information: Patient     History of Presenting Illness:   Capri Hoyt is a 66 y.o. female who presents with low hemoglobin    Patient was a transfer from short [facility] ER    Patient reports that she has been having some fatigue and malaise for the past few weeks. Went to her PCP on 2 September 2020, was found to have a hemoglobin of 6.8, and was told to come to the ER. Patient reports that she feels quite weak, almost exhausted, having some shortness of breath, muscle aches, and dizziness. Patient reports that she has observed some dark stools off and on, reports that she had B12 injection in the past for her weakness. Patient reports that she has not had dark stools for the last week or so. Patient reports that she has lost about 10 pounds in the last 5 weeks, reports that she has poor appetite. Patient reports that she had a COVID-19 test done about 3 weeks back which was negative. Patient reports that she takes a baby aspirin on a daily basis. Patient was found to have a hemoglobin of 6.5 at [facility] ED and was transferred to [facility] for further management and evaluation. Patient denies any other complaints or problems. The patient denies any Headache, blurry vision, sore throat, trouble swallowing, trouble with speech, chest pain, SOB, cough, fever, chills, N/V/D, abd pain, urinary symptoms, constipation, recent travels, sick contacts, focal or generalized oneirological symptoms,  falls, injuries, rashes, contact with COVID-19 diagnosed patients, hematemesis, melena, hemoptysis, hematuria, rashes, denies starting any new medications and denies any other concerns or problems besides as mentioned above. Review of Systems:  A comprehensive review of systems was negative except for that written in the History of Present Illness.      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    Carotid bruit 4/12/2011    Chest discomfort 4/12/2011    Diabetes (HCC)     GERD (gastroesophageal reflux disease)     Other ill-defined conditions(799.89)     celiac disease    Psychiatric disorder     depression/anxiety    Pure hypercholesterolemia 4/12/2011      Past Surgical History:   Procedure Laterality Date    CARDIAC SURG PROCEDURE UNLIST      cardiac cath - normal    HX APPENDECTOMY      HX GYN      tubal ligation    HX GYN      cyst removed from ovary at same time as appendectomy    HX HEENT      skin cancer removed from ear    HX HEENT      sinus surgery    HX HEENT      bilateral cataracts removed    HX ORTHOPAEDIC      back    HX OTHER SURGICAL      colonoscopy x 1 - hx colon polyps    HX WOOD AND BSO       Prior to Admission medications    Medication Sig Start Date End Date Taking? Authorizing Provider   doxepin (SINEquan) 150 mg capsule Take 150 mg by mouth nightly. Yes Skyler, MD Roxann   magnesium 250 mg tab Take 500 mg by mouth daily. Yes Skyler, MD Roxann   calcium-cholecalciferol, d3, (CALCIUM 600 + D) 600-125 mg-unit tab Take  by mouth. Yes Skyler, MD Roxann   Biotin 2,500 mcg cap Take 2,500 mcg by mouth daily. Yes Skyler, MD Roxann   cyanocobalamin (Vitamin B-12) 100 mcg tablet Take 100 mcg by mouth daily. Yes Roxann Holland MD   OXcarbazepine (TRILEPTAL) 300 mg tablet Take 300 mg by mouth daily. Yes Skyler, MD Roxann   gabapentin (Neurontin) 300 mg capsule Take 300 mg by mouth three (3) times daily. Yes Roxann Holland MD   metFORMIN (GLUCOPHAGE) 500 mg tablet Take 500 mg by mouth two (2) times daily (with meals). Yes Skyler, MD Roxann   glipiZIDE SR (GLUCOTROL XL) 5 mg CR tablet Take 5 mg by mouth daily. Yes Skyler, MD Roxann   hydroCHLOROthiazide (HYDRODIURIL) 25 mg tablet Take 25 mg by mouth daily.    Yes Skyler, MD Roxann   nystatin (MYCOSTATIN) 100,000 unit/mL suspension Take 1 tsp by mouth four (4) times daily. swish and spit   Yes Other, MD Roxann   ezetimibe (ZETIA) 10 mg tablet Take 10 mg by mouth daily. Yes Other, MD Roxann   NAPROXEN SODIUM (ALEVE PO) Take  by mouth as needed. Takes one po twice a day as needed    Yes Provider, Historical   aspirin delayed-release (ASPIR-81) 81 mg tablet Take 81 mg by mouth daily. Yes Provider, Historical   omeprazole (PRILOSEC) 20 mg capsule Take 20 mg by mouth two (2) times daily as needed.  4/12/11  Yes Provider, Historical     Allergies   Allergen Reactions    Codeine Other (comments)     constipation    Lactose Intolerance [Lactase] Swelling     Constipation/swelling from under breast down    Morphine Nausea and Vomiting    Sulfa (Sulfonamide Antibiotics) Hives      Family History   Problem Relation Age of Onset    Breast Cancer Sister     Colon Polyps Other         2 cousins        SOCIAL HISTORY:  Patient resides:  Independently x   Assisted Living    SNF    With family care       Smoking history:   None    Former    Chronic x     Alcohol history:   None    Social x   Chronic      Ambulates:   Independently x   w/cane    w/walker    w/wc    CODE STATUS:  DNR    Full x   Other      Objective:     Physical Exam:     Visit Vitals  /64 (BP 1 Location: Right arm, BP Patient Position: At rest;Lying right side)   Pulse 76   Temp 97.5 °F (36.4 °C)   Resp 18   Ht 5' 1\" (1.549 m)   Wt 74.3 kg (163 lb 12.8 oz)   SpO2 99%   BMI 30.95 kg/m²      O2 Device: Room air    General : alert x 3, awake, no acute distress, resting in bed, pleasant female, appears to be stated age  HEENT: PEERL, EOMI, moist mucus membrane, TM clear  Neck: supple, no JVD, no meningeal signs  Chest: Clear to auscultation bilaterally   CVS: S1 S2 heard, Capillary refill less than 2 seconds, 2/6 systolic murmur at RUSB  Abd: soft/ Non tender, non distended, BS physiological,   Ext: no clubbing, no cyanosis, no edema, brisk 2+ DP pulses  Neuro/Psych: pleasant mood and affect, CN 2-12 grossly intact, sensory grossly within normal limit, Strength 5/5 in all extremities, DTR 1+ x 4  Skin: warm      EKG:  Sinus arrhythmias with non specific ST changes  . Data Review:     Recent Days:  Recent Labs     09/04/20  1022   WBC 9.0   HGB 6.5*   HCT 24.9*        Recent Labs     09/04/20  1022   *   K 3.7   CL 92*   CO2 26   *   BUN 11   CREA 0.80   CA 9.1   ALB 3.3*   ALT 21     No results for input(s): PH, PCO2, PO2, HCO3, FIO2 in the last 72 hours. 24 Hour Results:  Recent Results (from the past 24 hour(s))   CBC WITH AUTOMATED DIFF    Collection Time: 09/04/20 10:22 AM   Result Value Ref Range    WBC 9.0 3.6 - 11.0 K/uL    RBC 4.12 3.80 - 5.20 M/uL    HGB 6.5 (L) 11.5 - 16.0 g/dL    HCT 24.9 (L) 35.0 - 47.0 %    MCV 60.4 (L) 80.0 - 99.0 FL    MCH 15.8 (L) 26.0 - 34.0 PG    MCHC 26.1 (L) 30.0 - 36.5 g/dL    RDW 20.1 (H) 11.5 - 14.5 %    PLATELET 825 040 - 303 K/uL    MPV 10.0 8.9 - 12.9 FL    NRBC 0.0 0  WBC    ABSOLUTE NRBC 0.00 0.00 - 0.01 K/uL    NEUTROPHILS 73 32 - 75 %    LYMPHOCYTES 18 12 - 49 %    MONOCYTES 8 5 - 13 %    EOSINOPHILS 0 0 - 7 %    BASOPHILS 0 0 - 1 %    IMMATURE GRANULOCYTES 1 (H) 0.0 - 0.5 %    ABS. NEUTROPHILS 6.6 1.8 - 8.0 K/UL    ABS. LYMPHOCYTES 1.6 0.8 - 3.5 K/UL    ABS. MONOCYTES 0.7 0.0 - 1.0 K/UL    ABS. EOSINOPHILS 0.0 0.0 - 0.4 K/UL    ABS. BASOPHILS 0.0 0.0 - 0.1 K/UL    ABS. IMM.  GRANS. 0.1 (H) 0.00 - 0.04 K/UL    DF SMEAR SCANNED      PLATELET COMMENTS Large Platelets      RBC COMMENTS ANISOCYTOSIS  1+        RBC COMMENTS OVALOCYTES  PRESENT        RBC COMMENTS MICROCYTOSIS  2+        RBC COMMENTS HYPOCHROMIA  3+       METABOLIC PANEL, COMPREHENSIVE    Collection Time: 09/04/20 10:22 AM   Result Value Ref Range    Sodium 128 (L) 136 - 145 mmol/L    Potassium 3.7 3.5 - 5.1 mmol/L    Chloride 92 (L) 97 - 108 mmol/L    CO2 26 21 - 32 mmol/L    Anion gap 10 5 - 15 mmol/L    Glucose 152 (H) 65 - 100 mg/dL    BUN 11 6 - 20 MG/DL    Creatinine 0.80 0.55 - 1.02 MG/DL    BUN/Creatinine ratio 14 12 - 20      GFR est AA >60 >60 ml/min/1.73m2    GFR est non-AA >60 >60 ml/min/1.73m2    Calcium 9.1 8.5 - 10.1 MG/DL    Bilirubin, total 0.2 0.2 - 1.0 MG/DL    ALT (SGPT) 21 12 - 78 U/L    AST (SGOT) 23 15 - 37 U/L    Alk. phosphatase 151 (H) 45 - 117 U/L    Protein, total 6.9 6.4 - 8.2 g/dL    Albumin 3.3 (L) 3.5 - 5.0 g/dL    Globulin 3.6 2.0 - 4.0 g/dL    A-G Ratio 0.9 (L) 1.1 - 2.2     OCCULT BLOOD, STOOL    Collection Time: 09/04/20 10:42 AM   Result Value Ref Range    Occult blood, stool Negative NEG     EKG, 12 LEAD, INITIAL    Collection Time: 09/04/20 11:01 AM   Result Value Ref Range    Ventricular Rate 80 BPM    Atrial Rate 80 BPM    P-R Interval 158 ms    QRS Duration 68 ms    Q-T Interval 358 ms    QTC Calculation (Bezet) 412 ms    Calculated P Axis 52 degrees    Calculated R Axis 23 degrees    Calculated T Axis 71 degrees    Diagnosis       Sinus rhythm with marked sinus arrhythmia  Septal infarct (cited on or before 12-DEC-2019)  Abnormal ECG  When compared with ECG of 12-DEC-2019 14:35,  No significant change was found  Confirmed by Warren Batista MD ([id number]) on 9/4/2020 3:33:26 PM           Imaging:   No results found.   Assessment:     Acute microcytic anemia: Concern for a GI bleed, observe patient on a telemetry bed, Protonix twice daily, stool occult was negative but patient may have slow GI bleed, GI consult, transfuse 1 unit PRBC, Protonix twice daily, ferritin, iron panel, may consider getting a hematology consult, monitor H&H and transfuse further as needed, further intervention per hospital course, reassess as needed    Hyponatremia: Gentle IV hydration with normal saline,  Monitor sodium levels, repeat BMP in the morning, neurochecks, supportive care and close monitoring, further intervention per hospital course, check FENa, reassess as needed    Diabetes mellitus type 2: Patient will be on sliding scale NovoLog insulin, Accu-Cheks, diet control, close monitoring, further intervention per hospital course, reassess as needed    History of bipolar disorder: Resume home medications once acute issues are resolved      Hyperlipidemia: Hold statin for tonight    GI DVT prophylaxis: Patient will be on SCDs                       Signed By: Brad Jaimes MD     September 4, 2020

## 2020-09-04 NOTE — ED TRIAGE NOTES
Pt sent here by her PCP for a low hemoglobin level of 6.8. pt states she has been feeling weak, tired, dizzines and sob over the past 5 weeks.

## 2020-09-05 VITALS
WEIGHT: 163.58 LBS | OXYGEN SATURATION: 99 % | RESPIRATION RATE: 18 BRPM | HEART RATE: 69 BPM | HEIGHT: 61 IN | TEMPERATURE: 99.3 F | DIASTOLIC BLOOD PRESSURE: 30 MMHG | SYSTOLIC BLOOD PRESSURE: 145 MMHG | BODY MASS INDEX: 30.88 KG/M2

## 2020-09-05 LAB
ABO + RH BLD: NORMAL
ANION GAP SERPL CALC-SCNC: 8 MMOL/L (ref 5–15)
BLD PROD TYP BPU: NORMAL
BLOOD GROUP ANTIBODIES SERPL: NORMAL
BPU ID: NORMAL
BUN SERPL-MCNC: 6 MG/DL (ref 6–20)
BUN/CREAT SERPL: 13 (ref 12–20)
CALCIUM SERPL-MCNC: 8.9 MG/DL (ref 8.5–10.1)
CHLORIDE SERPL-SCNC: 100 MMOL/L (ref 97–108)
CO2 SERPL-SCNC: 25 MMOL/L (ref 21–32)
CREAT SERPL-MCNC: 0.48 MG/DL (ref 0.55–1.02)
CROSSMATCH RESULT,%XM: NORMAL
FERRITIN SERPL-MCNC: 3 NG/ML (ref 26–388)
GLUCOSE BLD STRIP.AUTO-MCNC: 164 MG/DL (ref 65–100)
GLUCOSE BLD STRIP.AUTO-MCNC: 283 MG/DL (ref 65–100)
GLUCOSE SERPL-MCNC: 134 MG/DL (ref 65–100)
HGB BLD-MCNC: 7.9 G/DL (ref 11.5–16)
IRON SATN MFR SERPL: 7 % (ref 20–50)
IRON SERPL-MCNC: 35 UG/DL (ref 35–150)
POTASSIUM SERPL-SCNC: 4.4 MMOL/L (ref 3.5–5.1)
SERVICE CMNT-IMP: ABNORMAL
SERVICE CMNT-IMP: ABNORMAL
SODIUM SERPL-SCNC: 133 MMOL/L (ref 136–145)
SPECIMEN EXP DATE BLD: NORMAL
STATUS OF UNIT,%ST: NORMAL
TIBC SERPL-MCNC: 537 UG/DL (ref 250–450)
TROPONIN I SERPL-MCNC: <0.05 NG/ML
UNIT DIVISION, %UDIV: 0

## 2020-09-05 PROCEDURE — 99218 HC RM OBSERVATION: CPT

## 2020-09-05 PROCEDURE — 96375 TX/PRO/DX INJ NEW DRUG ADDON: CPT

## 2020-09-05 PROCEDURE — 36415 COLL VENOUS BLD VENIPUNCTURE: CPT

## 2020-09-05 PROCEDURE — 82962 GLUCOSE BLOOD TEST: CPT

## 2020-09-05 PROCEDURE — 83540 ASSAY OF IRON: CPT

## 2020-09-05 PROCEDURE — 74011000258 HC RX REV CODE- 258: Performed by: HOSPITALIST

## 2020-09-05 PROCEDURE — 74011250636 HC RX REV CODE- 250/636: Performed by: FAMILY MEDICINE

## 2020-09-05 PROCEDURE — 96376 TX/PRO/DX INJ SAME DRUG ADON: CPT

## 2020-09-05 PROCEDURE — 74011636637 HC RX REV CODE- 636/637: Performed by: FAMILY MEDICINE

## 2020-09-05 PROCEDURE — 80048 BASIC METABOLIC PNL TOTAL CA: CPT

## 2020-09-05 PROCEDURE — 74011250636 HC RX REV CODE- 250/636: Performed by: HOSPITALIST

## 2020-09-05 PROCEDURE — 85018 HEMOGLOBIN: CPT

## 2020-09-05 PROCEDURE — 84484 ASSAY OF TROPONIN QUANT: CPT

## 2020-09-05 PROCEDURE — 94760 N-INVAS EAR/PLS OXIMETRY 1: CPT

## 2020-09-05 PROCEDURE — 74011000250 HC RX REV CODE- 250: Performed by: FAMILY MEDICINE

## 2020-09-05 PROCEDURE — 82728 ASSAY OF FERRITIN: CPT

## 2020-09-05 PROCEDURE — C9113 INJ PANTOPRAZOLE SODIUM, VIA: HCPCS | Performed by: FAMILY MEDICINE

## 2020-09-05 RX ORDER — FERROUS GLUCONATE 324(37.5)
324 TABLET ORAL
Qty: 60 TAB | Refills: 0 | Status: SHIPPED | OUTPATIENT
Start: 2020-09-05 | End: 2020-10-05

## 2020-09-05 RX ORDER — PANTOPRAZOLE SODIUM 40 MG/1
40 TABLET, DELAYED RELEASE ORAL 2 TIMES DAILY
Qty: 60 TAB | Refills: 0 | Status: SHIPPED | OUTPATIENT
Start: 2020-09-05 | End: 2020-10-05

## 2020-09-05 RX ADMIN — Medication 10 ML: at 05:54

## 2020-09-05 RX ADMIN — IRON SUCROSE 100 MG: 20 INJECTION, SOLUTION INTRAVENOUS at 09:13

## 2020-09-05 RX ADMIN — INSULIN LISPRO 3 UNITS: 100 INJECTION, SOLUTION INTRAVENOUS; SUBCUTANEOUS at 11:38

## 2020-09-05 RX ADMIN — SODIUM CHLORIDE 40 MG: 9 INJECTION, SOLUTION INTRAMUSCULAR; INTRAVENOUS; SUBCUTANEOUS at 05:54

## 2020-09-05 NOTE — PROGRESS NOTES
08:51 Spoke with Dr. Chang Gave concerning patient's NPO status and plan of care. Patient H&H now 7.9 and NPO; He advised us to page Dr. Vinny Burton or the on Call for Gastroenterology as Dr. Vinny Burton saw her in the ED. Paged Dr. Vinny Burton and advised Dr. Jacinto Cagle is on call. Dr. Chang Gave advised until then it is ok to give sips of water. Linda Alvares    08:56 Dr Jacinto Cagle  Returned call and will come see the patient.  Linda Alvares

## 2020-09-05 NOTE — DISCHARGE SUMMARY
Discharge Summary       PATIENT ID: Chalino Olson  MRN: [id number]   YOB: 1945    DATE OF ADMISSION: 9/4/2020 10:10 AM    DATE OF DISCHARGE: 9/5/20   PRIMARY CARE PROVIDER: Sintia Reyes MD     ATTENDING PHYSICIAN: Francheska Piper  DISCHARGING PROVIDER: Javy Desai MD    To contact this individual call [phone] and ask the  to page. If unavailable ask to be transferred the Adult Hospitalist Department. CONSULTATIONS: IP CONSULT TO GASTROENTEROLOGY    PROCEDURES/SURGERIES: * No surgery found *    ADMITTING DIAGNOSES & HOSPITAL COURSE:   Chalino Olson is a 66 y.o. female who presents with low hemoglobin        DISCHARGE DIAGNOSES / PLAN:      1. She was given BT hgb close to 8 and seen by GI , no acute need for EGD plan for out pt EGD /c olon per Dr. Ravinder Dos Santos after d/c pt given PPI bid and oral iron      ADDITIONAL CARE RECOMMENDATIONS:        PENDING TEST RESULTS:   At the time of discharge the following test results are still pending:     FOLLOW UP APPOINTMENTS:    Follow-up Information     Follow up With Specialties Details Why Contact Info    Sintia Reyes MD Family Medicine In 1 week need out pt EGD and colonoscopy [address]  [phone]      Lisa Foster MD Gastroenterology In 2 weeks  [address] [facility] [phone]               DIET: Regular Diet    ACTIVITY: Activity as tolerated    WOUND CARE:     EQUIPMENT needed:       DISCHARGE MEDICATIONS:  Current Discharge Medication List      START taking these medications    Details   pantoprazole (PROTONIX) 40 mg tablet Take 1 Tab by mouth two (2) times a day for 30 days. Qty: 60 Tab, Refills: 0      ferrous gluconate 324 mg (37.5 mg iron) tablet Take 1 Tab by mouth two (2) times daily (after meals) for 30 days.   Qty: 60 Tab, Refills: 0         CONTINUE these medications which have NOT CHANGED    Details   doxepin (SINEquan) 150 mg capsule Take 150 mg by mouth nightly.      magnesium 250 mg tab Take 500 mg by mouth daily. calcium-cholecalciferol, d3, (CALCIUM 600 + D) 600-125 mg-unit tab Take  by mouth. Biotin 2,500 mcg cap Take 2,500 mcg by mouth daily. cyanocobalamin (Vitamin B-12) 100 mcg tablet Take 100 mcg by mouth daily. gabapentin (Neurontin) 300 mg capsule Take 300 mg by mouth three (3) times daily. metFORMIN (GLUCOPHAGE) 500 mg tablet Take 500 mg by mouth two (2) times daily (with meals). glipiZIDE SR (GLUCOTROL XL) 5 mg CR tablet Take 5 mg by mouth daily. hydroCHLOROthiazide (HYDRODIURIL) 25 mg tablet Take 25 mg by mouth daily. nystatin (MYCOSTATIN) 100,000 unit/mL suspension Take 1 tsp by mouth four (4) times daily. swish and spit      ezetimibe (ZETIA) 10 mg tablet Take 10 mg by mouth daily. STOP taking these medications       NAPROXEN SODIUM (ALEVE PO) Comments:   Reason for Stopping:         aspirin delayed-release (ASPIR-81) 81 mg tablet Comments:   Reason for Stopping:         omeprazole (PRILOSEC) 20 mg capsule Comments:   Reason for Stopping:                 NOTIFY YOUR PHYSICIAN FOR ANY OF THE FOLLOWING:   Fever over 101 degrees for 24 hours. Chest pain, shortness of breath, fever, chills, nausea, vomiting, diarrhea, change in mentation, falling, weakness, bleeding. Severe pain or pain not relieved by medications. Or, any other signs or symptoms that you may have questions about. DISPOSITION:  x  Home With:   OT  PT  HH  RN       Long term SNF/Inpatient Rehab    Independent/assisted living    Hospice    Other:       PATIENT CONDITION AT DISCHARGE:     Functional status    Poor     Deconditioned    x Independent      Cognition   x  Lucid     Forgetful     Dementia      Catheters/lines (plus indication)    Asher     PICC     PEG    x None      Code status   x  Full code     DNR      PHYSICAL EXAMINATION AT DISCHARGE:  General:          Alert, cooperative, no distress, appears stated age. HEENT:           Atraumatic, anicteric sclerae, pink conjunctivae                          No oral ulcers, mucosa moist, throat clear, dentition fair  Neck:               Supple, symmetrical  Lungs:             Clear to auscultation bilaterally. No Wheezing or Rhonchi. No rales. Chest wall:      No tenderness  No Accessory muscle use. Heart:              Regular  rhythm,  No  murmur   No edema  Abdomen:        Soft, non-tender. Not distended. Bowel sounds normal  Extremities:     No cyanosis. No clubbing,                            Skin turgor normal, Capillary refill normal  Skin:                Not pale. Not Jaundiced  No rashes   Psych:             Not anxious or agitated.   Neurologic:      Alert, moves all extremities, answers questions appropriately and responds to commands       CHRONIC MEDICAL DIAGNOSES:  Problem List as of 9/5/2020 Never Reviewed          Codes Class Noted - Resolved    Anemia ICD-10-CM: D64.9  ICD-9-CM: 285.9  9/4/2020 - Present        Chest discomfort ICD-10-CM: R07.89  ICD-9-CM: 786.59  4/12/2011 - Present        Pure hypercholesterolemia ICD-10-CM: E78.00  ICD-9-CM: 272.0  4/12/2011 - Present        Carotid bruit ICD-10-CM: R09.89  ICD-9-CM: 785.9  4/12/2011 - Present              Greater than 20  minutes were spent with the patient on counseling and coordination of care    Signed:   Cornelius Magaña MD  9/5/2020  9:46 AM

## 2020-09-05 NOTE — PROGRESS NOTES
Problem: Falls - Risk of  Goal: *Absence of Falls  Description: Document Kelly Ac Fall Risk and appropriate interventions in the flowsheet. Outcome: Progressing Towards Goal  Note: Fall Risk Interventions:                                Problem: Patient Education: Go to Patient Education Activity  Goal: Patient/Family Education  Outcome: Progressing Towards Goal     Problem: Patient Education: Go to Patient Education Activity  Goal: Patient/Family Education  Outcome: Progressing Towards Goal     Problem: Upper and Lower GI Bleed: Day 1  Goal: Off Pathway (Use only if patient is Off Pathway)  Outcome: Progressing Towards Goal  Goal: Activity/Safety  Outcome: Progressing Towards Goal  Goal: Consults, if ordered  Outcome: Progressing Towards Goal  Goal: Diagnostic Test/Procedures  Outcome: Progressing Towards Goal  Goal: Nutrition/Diet  Outcome: Progressing Towards Goal  Goal: Discharge Planning  Outcome: Progressing Towards Goal  Goal: Medications  Outcome: Progressing Towards Goal  Goal: Respiratory  Outcome: Progressing Towards Goal  Goal: Treatments/Interventions/Procedures  Outcome: Progressing Towards Goal  Goal: Psychosocial  Outcome: Progressing Towards Goal  Goal: *Optimal pain control at patient's stated goal  Outcome: Progressing Towards Goal  Goal: *Hemodynamically stable  Outcome: Progressing Towards Goal  Goal: *Demonstrates progressive activity  Outcome: Progressing Towards Goal     Problem: Diabetes Self-Management  Goal: *Disease process and treatment process  Description: Define diabetes and identify own type of diabetes; list 3 options for treating diabetes. Outcome: Progressing Towards Goal  Goal: *Incorporating nutritional management into lifestyle  Description: Describe effect of type, amount and timing of food on blood glucose; list 3 methods for planning meals.   Outcome: Progressing Towards Goal  Goal: *Incorporating physical activity into lifestyle  Description: State effect of exercise on blood glucose levels. Outcome: Progressing Towards Goal  Goal: *Developing strategies to promote health/change behavior  Description: Define the ABC's of diabetes; identify appropriate screenings, schedule and personal plan for screenings. Outcome: Progressing Towards Goal  Goal: *Using medications safely  Description: State effect of diabetes medications on diabetes; name diabetes medication taking, action and side effects. Outcome: Progressing Towards Goal  Goal: *Monitoring blood glucose, interpreting and using results  Description: Identify recommended blood glucose targets  and personal targets. Outcome: Progressing Towards Goal  Goal: *Prevention, detection, treatment of acute complications  Description: List symptoms of hyper- and hypoglycemia; describe how to treat low blood sugar and actions for lowering  high blood glucose level. Outcome: Progressing Towards Goal  Goal: *Prevention, detection and treatment of chronic complications  Description: Define the natural course of diabetes and describe the relationship of blood glucose levels to long term complications of diabetes.   Outcome: Progressing Towards Goal  Goal: *Developing strategies to address psychosocial issues  Description: Describe feelings about living with diabetes; identify support needed and support network  Outcome: Progressing Towards Goal  Goal: *Insulin pump training  Outcome: Progressing Towards Goal  Goal: *Sick day guidelines  Outcome: Progressing Towards Goal  Goal: *Patient Specific Goal (EDIT GOAL, INSERT TEXT)  Outcome: Progressing Towards Goal     Problem: Patient Education: Go to Patient Education Activity  Goal: Patient/Family Education  Outcome: Progressing Towards Goal

## 2020-09-05 NOTE — PROGRESS NOTES
Problem: Falls - Risk of  Goal: *Absence of Falls  Description: Document James Vance Fall Risk and appropriate interventions in the flowsheet.   Outcome: Progressing Towards Goal  Note: Fall Risk Interventions:                                Problem: Patient Education: Go to Patient Education Activity  Goal: Patient/Family Education  Outcome: Progressing Towards Goal     Problem: Patient Education: Go to Patient Education Activity  Goal: Patient/Family Education  Outcome: Progressing Towards Goal     Problem: Upper and Lower GI Bleed: Day 1  Goal: Off Pathway (Use only if patient is Off Pathway)  Outcome: Progressing Towards Goal  Goal: Activity/Safety  Outcome: Progressing Towards Goal  Goal: Consults, if ordered  Outcome: Progressing Towards Goal  Goal: Diagnostic Test/Procedures  Outcome: Progressing Towards Goal  Goal: Nutrition/Diet  Outcome: Progressing Towards Goal  Goal: Discharge Planning  Outcome: Progressing Towards Goal  Goal: Medications  Outcome: Progressing Towards Goal  Goal: Respiratory  Outcome: Progressing Towards Goal  Goal: Treatments/Interventions/Procedures  Outcome: Progressing Towards Goal  Goal: Psychosocial  Outcome: Progressing Towards Goal  Goal: *Optimal pain control at patient's stated goal  Outcome: Progressing Towards Goal  Goal: *Hemodynamically stable  Outcome: Progressing Towards Goal  Goal: *Demonstrates progressive activity  Outcome: Progressing Towards Goal     Problem: Upper and Lower GI Bleed: Day 2  Goal: Off Pathway (Use only if patient is Off Pathway)  Outcome: Progressing Towards Goal  Goal: Activity/Safety  Outcome: Progressing Towards Goal  Goal: Consults, if ordered  Outcome: Progressing Towards Goal  Goal: Diagnostic Test/Procedures  Outcome: Progressing Towards Goal  Goal: Nutrition/Diet  Outcome: Progressing Towards Goal  Goal: Discharge Planning  Outcome: Progressing Towards Goal  Goal: Medications  Outcome: Progressing Towards Goal  Goal: Respiratory  Outcome: Progressing Towards Goal  Goal: Treatments/Interventions/Procedures  Outcome: Progressing Towards Goal  Goal: Psychosocial  Outcome: Progressing Towards Goal  Goal: *Optimal pain control at patient's stated goal  Outcome: Progressing Towards Goal  Goal: *Hemodynamically stable  Outcome: Progressing Towards Goal  Goal: *Tolerating diet  Outcome: Progressing Towards Goal  Goal: *Demonstrates progressive activity  Outcome: Progressing Towards Goal     Problem: Upper and Lower GI Bleed: Day 3  Goal: Off Pathway (Use only if patient is Off Pathway)  Outcome: Progressing Towards Goal  Goal: Activity/Safety  Outcome: Progressing Towards Goal  Goal: Diagnostic Test/Procedures  Outcome: Progressing Towards Goal  Goal: Nutrition/Diet  Outcome: Progressing Towards Goal  Goal: Discharge Planning  Outcome: Progressing Towards Goal  Goal: Medications  Outcome: Progressing Towards Goal  Goal: Treatments/Interventions/Procedures  Outcome: Progressing Towards Goal  Goal: Psychosocial  Outcome: Progressing Towards Goal     Problem: Upper and Lower GI Bleed:  Discharge Outcomes  Goal: *Hemodynamically stable  Outcome: Progressing Towards Goal  Goal: *Lungs clear or at baseline  Outcome: Progressing Towards Goal  Goal: *Demonstrates independent activity or return to baseline  Outcome: Progressing Towards Goal  Goal: *Pain is controlled to three or less  Outcome: Progressing Towards Goal  Goal: *Verbalizes understanding and describes prescribed diet  Outcome: Progressing Towards Goal  Goal: *Tolerating diet  Outcome: Progressing Towards Goal  Goal: *Verbalizes name, dosage, time, side effects, and number of days to continue medications  Outcome: Progressing Towards Goal  Goal: *Anxiety reduced or absent  Outcome: Progressing Towards Goal  Goal: *Understands and describes signs and symptoms to report to providers(Stroke Metric)  Outcome: Progressing Towards Goal  Goal: *Describes follow-up/return visits to physicians  Outcome: Progressing Towards Goal  Goal: *Describes available resources and support systems  Outcome: Progressing Towards Goal     Problem: Diabetes Self-Management  Goal: *Disease process and treatment process  Description: Define diabetes and identify own type of diabetes; list 3 options for treating diabetes. Outcome: Progressing Towards Goal  Goal: *Incorporating nutritional management into lifestyle  Description: Describe effect of type, amount and timing of food on blood glucose; list 3 methods for planning meals. Outcome: Progressing Towards Goal  Goal: *Incorporating physical activity into lifestyle  Description: State effect of exercise on blood glucose levels. Outcome: Progressing Towards Goal  Goal: *Developing strategies to promote health/change behavior  Description: Define the ABC's of diabetes; identify appropriate screenings, schedule and personal plan for screenings. Outcome: Progressing Towards Goal  Goal: *Using medications safely  Description: State effect of diabetes medications on diabetes; name diabetes medication taking, action and side effects. Outcome: Progressing Towards Goal  Goal: *Monitoring blood glucose, interpreting and using results  Description: Identify recommended blood glucose targets  and personal targets. Outcome: Progressing Towards Goal  Goal: *Prevention, detection, treatment of acute complications  Description: List symptoms of hyper- and hypoglycemia; describe how to treat low blood sugar and actions for lowering  high blood glucose level. Outcome: Progressing Towards Goal  Goal: *Prevention, detection and treatment of chronic complications  Description: Define the natural course of diabetes and describe the relationship of blood glucose levels to long term complications of diabetes.   Outcome: Progressing Towards Goal  Goal: *Developing strategies to address psychosocial issues  Description: Describe feelings about living with diabetes; identify support needed and support network  Outcome: Progressing Towards Goal  Goal: *Insulin pump training  Outcome: Progressing Towards Goal  Goal: *Sick day guidelines  Outcome: Progressing Towards Goal  Goal: *Patient Specific Goal (EDIT GOAL, INSERT TEXT)  Outcome: Progressing Towards Goal     Problem: Patient Education: Go to Patient Education Activity  Goal: Patient/Family Education  Outcome: Progressing Towards Goal

## 2020-09-05 NOTE — DISCHARGE INSTRUCTIONS
Discharge Instructions       PATIENT ID: Shey Soto  MRN: [id number]   YOB: 1945    DATE OF ADMISSION: 9/4/2020 10:10 AM    DATE OF DISCHARGE: 9/5/2020    PRIMARY CARE PROVIDER: Dat Carranza MD     ATTENDING PHYSICIAN: Vickey Vaz MD  DISCHARGING PROVIDER: Zaheer Aguilar MD    To contact this individual call [phone] and ask the  to page. If unavailable ask to be transferred the Adult Hospitalist Department. DISCHARGE DIAGNOSES GIB    CONSULTATIONS: IP CONSULT TO GASTROENTEROLOGY    PROCEDURES/SURGERIES: * No surgery found *    PENDING TEST RESULTS:   At the time of discharge the following test results are still pending:     FOLLOW UP APPOINTMENTS:   Follow-up Information     Follow up With Specialties Details Why Contact Info    Dat Carranza MD Family Medicine In 1 week need out pt EGD and colonoscopy [address]  [phone]      Yang Carvalho MD Gastroenterology In 2 weeks  [address] [facility] [phone]             ADDITIONAL CARE RECOMMENDATIONS: stop aspirin and naproxen for 2 weeks follow up with Dr. Anamika Alvarado for EGD and colonoscopy     DIET: Regular Diet    ACTIVITY: Activity as tolerated    WOUND CARE:     EQUIPMENT needed:       DISCHARGE MEDICATIONS:   See Medication Reconciliation Form    · It is important that you take the medication exactly as they are prescribed. · Keep your medication in the bottles provided by the pharmacist and keep a list of the medication names, dosages, and times to be taken in your wallet. · Do not take other medications without consulting your doctor. NOTIFY YOUR PHYSICIAN FOR ANY OF THE FOLLOWING:   Fever over 101 degrees for 24 hours. Chest pain, shortness of breath, fever, chills, nausea, vomiting, diarrhea, change in mentation, falling, weakness, bleeding. Severe pain or pain not relieved by medications.   Or, any other signs or symptoms that you may have questions about.       DISPOSITION:  x  Home With:   OT  PT  HH  RN       SNF/Inpatient Rehab/LTAC    Independent/assisted living    Hospice    Other:     CDMP Checked:   Yes x     PROBLEM LIST Updated:  Yes x       Signed:   Skipper Dance, MD  9/5/2020  9:45 AM

## 2020-09-05 NOTE — CONSULTS
[address]  [address] [facility], [address] [place] [address]       GASTROENTEROLOGY CONSULTATION NOTE        NAME:  Héctor Holbrook   :   1945   MRN:   [id number]           Consult Date: 2020 9:38 AM      History of Present Illness:  Patient is a 66 y.o. who is seen in consultation at the request of Dr. Danisha Herrera for anemia. She was admitted for DEANA, no GI complaints, last colonoscopy in  by Dr Haroon Broussard, her only c/o fatigue, got 1 unit of blood transfusion and getting iv iron, denies any melena, pain, nausea or vomiting, on PPI for GERD. 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    Carotid bruit 2011    Chest discomfort 2011    Diabetes (HCC)     GERD (gastroesophageal reflux disease)     Other ill-defined conditions(799.89)     celiac disease    Psychiatric disorder     depression/anxiety    Pure hypercholesterolemia 2011       PSH:  Past Surgical History:   Procedure Laterality Date    CARDIAC SURG PROCEDURE UNLIST      cardiac cath - normal    HX APPENDECTOMY      HX GYN      tubal ligation    HX GYN      cyst removed from ovary at same time as appendectomy    HX HEENT      skin cancer removed from ear    HX HEENT      sinus surgery    HX HEENT      bilateral cataracts removed    HX ORTHOPAEDIC      back    HX OTHER SURGICAL      colonoscopy x 1 - hx colon polyps    HX WOOD AND BSO         Allergies: Allergies   Allergen Reactions    Codeine Other (comments)     constipation    Lactose Intolerance [Lactase] Swelling     Constipation/swelling from under breast down    Morphine Nausea and Vomiting    Sulfa (Sulfonamide Antibiotics) Hives       Home Medications:  Prior to Admission Medications   Prescriptions Last Dose Informant Patient Reported? Taking? Biotin 2,500 mcg cap 2020 at Unknown time  Yes Yes   Sig: Take 2,500 mcg by mouth daily.    NAPROXEN SODIUM (ALEVE PO) 9/3/2020 at Unknown time  Yes Yes   Sig: Take  by mouth as needed. Takes one po twice a day as needed    OXcarbazepine (TRILEPTAL) 300 mg tablet 9/4/2020 at Unknown time  Yes Yes   Sig: Take 300 mg by mouth daily. aspirin delayed-release (ASPIR-81) 81 mg tablet 9/4/2020 at Unknown time  Yes Yes   Sig: Take 81 mg by mouth daily. calcium-cholecalciferol, d3, (CALCIUM 600 + D) 600-125 mg-unit tab 9/4/2020 at biotin  Yes Yes   Sig: Take  by mouth.   cyanocobalamin (Vitamin B-12) 100 mcg tablet 9/4/2020 at Unknown time  Yes Yes   Sig: Take 100 mcg by mouth daily. doxepin (SINEquan) 150 mg capsule 9/3/2020 at Unknown time  Yes Yes   Sig: Take 150 mg by mouth nightly.   ezetimibe (ZETIA) 10 mg tablet 9/4/2020 at Unknown time  Yes Yes   Sig: Take 10 mg by mouth daily. gabapentin (Neurontin) 300 mg capsule 9/4/2020 at Unknown time  Yes Yes   Sig: Take 300 mg by mouth three (3) times daily. glipiZIDE SR (GLUCOTROL XL) 5 mg CR tablet 9/4/2020 at Unknown time  Yes Yes   Sig: Take 5 mg by mouth daily. hydroCHLOROthiazide (HYDRODIURIL) 25 mg tablet 9/4/2020 at Unknown time  Yes Yes   Sig: Take 25 mg by mouth daily. magnesium 250 mg tab 9/4/2020 at Unknown time  Yes Yes   Sig: Take 500 mg by mouth daily. metFORMIN (GLUCOPHAGE) 500 mg tablet 9/4/2020 at Unknown time  Yes Yes   Sig: Take 500 mg by mouth two (2) times daily (with meals). nystatin (MYCOSTATIN) 100,000 unit/mL suspension 9/4/2020 at Unknown time  Yes Yes   Sig: Take 1 tsp by mouth four (4) times daily. swish and spit   omeprazole (PRILOSEC) 20 mg capsule 9/4/2020 at Unknown time  Yes Yes   Sig: Take 20 mg by mouth two (2) times daily as needed.       Facility-Administered Medications: None       Hospital Medications:  Current Facility-Administered Medications   Medication Dose Route Frequency    iron sucrose (VENOFER) 100 mg in 0.9% sodium chloride 100 mL IVPB  100 mg IntraVENous Q24H    sodium chloride (NS) flush 5-40 mL  5-40 mL IntraVENous Q8H    sodium chloride (NS) flush 5-40 mL  5-40 mL IntraVENous PRN    0.9% sodium chloride infusion  75 mL/hr IntraVENous CONTINUOUS    pantoprazole (PROTONIX) 40 mg in 0.9% sodium chloride 10 mL injection  40 mg IntraVENous Q12H    glucose chewable tablet 16 g  4 Tab Oral PRN    glucagon (GLUCAGEN) injection 1 mg  1 mg IntraMUSCular PRN    dextrose 10% infusion 0-250 mL  0-250 mL IntraVENous PRN    insulin lispro (HUMALOG) injection   SubCUTAneous Q6H    0.9% sodium chloride infusion 250 mL  250 mL IntraVENous PRN       Social History:  Social History     Tobacco Use    Smoking status: Current Every Day Smoker     Packs/day: 0.50     Years: 30.00     Pack years: 15.00    Smokeless tobacco: Never Used    Tobacco comment: current cigarette smoker   Substance Use Topics    Alcohol use: Yes     Comment: occasional       Family History:  Family History   Problem Relation Age of Onset    Breast Cancer Sister     Colon Polyps Other         2 cousins       Review of Systems:  Constitutional: negative fever, negative chills, negative weight loss  Eyes:   negative visual changes  ENT:   negative sore throat, tongue or lip swelling  Respiratory:  negative cough, negative dyspnea  Cards:  negative for chest pain, palpitations, lower extremity edema  GI:   See HPI  :  negative for frequency, dysuria  Integument:  negative for rash and pruritus  Heme:  negative for easy bruising and gum/nose bleeding  Musculoskel: negative for myalgias,  back pain and muscle weakness  Neuro: negative for headaches, dizziness, vertigo  Psych:  negative for feelings of anxiety, depression     Objective:     Patient Vitals for the past 8 hrs:   BP Temp Pulse Resp SpO2 Weight   09/05/20 0821 (!) 145/30 99.3 °F (37.4 °C) 69 18 99 %    09/05/20 0334 108/56 97.8 °F (36.6 °C) 79 18 99 % 74.2 kg (163 lb 9.3 oz)     No intake/output data recorded.   09/03 1901 - 09/05 0700  In: 269.6   Out: -     EXAM: NEURO-a&o   HEENT-wnl   LUNGS-clear    COR-regular rate and rhythym     ABD-soft , no tenderness, no rebound, good bs     EXT-no edema     Data Review     Recent Labs     09/05/20  0306 09/04/20  1747 09/04/20  1022   WBC  --   --  9.0   HGB 7.9* 6.0* 6.5*   HCT  --   --  24.9*   PLT  --   --  302     Recent Labs     09/05/20  0306 09/04/20  1022   * 128*   K 4.4 3.7    92*   CO2 25 26   BUN 6 11   CREA 0.48* 0.80   * 152*   CA 8.9 9.1     Recent Labs     09/04/20  1022   *   TP 6.9   ALB 3.3*   GLOB 3.6     No results for input(s): INR, PTP, APTT, INREXT in the last 72 hours.        Assessment:   · IRON DEFICIENCY ANEMIA, no evidence of active GI bleed, got transfused     Patient Active Problem List   Diagnosis Code    Chest discomfort R07.89    Pure hypercholesterolemia E78.00    Carotid bruit R09.89    Anemia D64.9     Plan:   · May d/c today  · No indication for inpatient endoscopic w/u  · Instructed to call on Tuesday, Dr Coleen Mark to get outpatient  EGD and colonoscopy scheduled, she was agreeable  · Informed Dr Marylin Rich of that plan     Signed By: Tate Herrera MD     9/5/2020  9:38 AM

## 2021-06-23 ENCOUNTER — TRANSCRIBE ORDER (OUTPATIENT)
Dept: SCHEDULING | Age: 76
End: 2021-06-23

## 2021-06-23 DIAGNOSIS — Z12.2 ENCOUNTER FOR SCREENING FOR LUNG CANCER: Primary | ICD-10-CM

## 2021-06-23 DIAGNOSIS — Z72.0 TOBACCO ABUSE: Primary | ICD-10-CM

## 2021-06-25 ENCOUNTER — TRANSCRIBE ORDER (OUTPATIENT)
Dept: SCHEDULING | Age: 76
End: 2021-06-25

## 2021-06-25 ENCOUNTER — HOSPITAL ENCOUNTER (OUTPATIENT)
Dept: CT IMAGING | Age: 76
Discharge: HOME | End: 2021-06-25
Attending: FAMILY MEDICINE
Payer: MEDICARE

## 2021-06-25 DIAGNOSIS — F17.200 NICOTINE DEPENDENCE, UNCOMPLICATED, UNSPECIFIED NICOTINE PRODUCT TYPE: Primary | ICD-10-CM

## 2021-06-25 DIAGNOSIS — Z87.891 HISTORY OF SMOKING: ICD-10-CM

## 2021-06-25 DIAGNOSIS — F17.200 NICOTINE DEPENDENCE, UNCOMPLICATED: Primary | ICD-10-CM

## 2021-06-25 PROCEDURE — 71271 CT THORAX LUNG CANCER SCR C-: CPT

## 2021-09-15 ENCOUNTER — HOSPITAL ENCOUNTER (EMERGENCY)
Age: 76
Discharge: HOME | End: 2021-09-15
Attending: EMERGENCY MEDICINE | Admitting: EMERGENCY MEDICINE
Payer: MEDICARE

## 2021-09-15 ENCOUNTER — APPOINTMENT (OUTPATIENT)
Dept: GENERAL RADIOLOGY | Age: 76
End: 2021-09-15
Attending: PHYSICIAN ASSISTANT
Payer: MEDICARE

## 2021-09-15 ENCOUNTER — APPOINTMENT (OUTPATIENT)
Dept: CT IMAGING | Age: 76
End: 2021-09-15
Attending: PHYSICIAN ASSISTANT
Payer: MEDICARE

## 2021-09-15 VITALS
WEIGHT: 166.01 LBS | DIASTOLIC BLOOD PRESSURE: 69 MMHG | BODY MASS INDEX: 31.34 KG/M2 | RESPIRATION RATE: 18 BRPM | HEART RATE: 77 BPM | SYSTOLIC BLOOD PRESSURE: 148 MMHG | TEMPERATURE: 98.3 F | OXYGEN SATURATION: 94 % | HEIGHT: 61 IN
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
## 2021-09-15 DIAGNOSIS — S92.311A CLOSED DISPLACED FRACTURE OF FIRST METATARSAL BONE OF RIGHT FOOT, INITIAL ENCOUNTER: Primary | ICD-10-CM

## 2021-09-15 DIAGNOSIS — S92.354A CLOSED NONDISPLACED FRACTURE OF FIFTH METATARSAL BONE OF RIGHT FOOT, INITIAL ENCOUNTER: ICD-10-CM

## 2021-09-15 DIAGNOSIS — S92.321A DISPLACED FRACTURE OF SECOND METATARSAL BONE, RIGHT FOOT, INITIAL ENCOUNTER FOR CLOSED FRACTURE: ICD-10-CM

## 2021-09-15 DIAGNOSIS — S92.331A CLOSED DISPLACED FRACTURE OF THIRD METATARSAL BONE OF RIGHT FOOT, INITIAL ENCOUNTER: ICD-10-CM

## 2021-09-15 DIAGNOSIS — S92.341A CLOSED DISPLACED FRACTURE OF FOURTH METATARSAL BONE OF RIGHT FOOT, INITIAL ENCOUNTER: ICD-10-CM

## 2021-09-15 LAB
ALBUMIN SERPL-MCNC: 3.7 G/DL (ref 3.5–5)
ALBUMIN/GLOB SERPL: 1.1 {RATIO} (ref 1.1–2.2)
ALP SERPL-CCNC: 153 U/L (ref 45–117)
ALT SERPL-CCNC: 22 U/L (ref 12–78)
ANION GAP SERPL CALC-SCNC: 14 MMOL/L (ref 5–15)
APPEARANCE UR: ABNORMAL
AST SERPL-CCNC: 15 U/L (ref 15–37)
BACTERIA URNS QL MICRO: NEGATIVE /HPF
BASOPHILS # BLD: 0 K/UL (ref 0–0.1)
BASOPHILS NFR BLD: 0 % (ref 0–1)
BILIRUB SERPL-MCNC: 0.2 MG/DL (ref 0.2–1)
BILIRUB UR QL: NEGATIVE
BUN SERPL-MCNC: 13 MG/DL (ref 6–20)
BUN/CREAT SERPL: 18 (ref 12–20)
CALCIUM SERPL-MCNC: 9.3 MG/DL (ref 8.5–10.1)
CHLORIDE SERPL-SCNC: 90 MMOL/L (ref 97–108)
CO2 SERPL-SCNC: 23 MMOL/L (ref 21–32)
COLOR UR: ABNORMAL
CREAT SERPL-MCNC: 0.74 MG/DL (ref 0.55–1.02)
DIFFERENTIAL METHOD BLD: ABNORMAL
EOSINOPHIL # BLD: 0 K/UL (ref 0–0.4)
EOSINOPHIL NFR BLD: 0 % (ref 0–7)
EPITH CASTS URNS QL MICRO: ABNORMAL /LPF
ERYTHROCYTE [DISTWIDTH] IN BLOOD BY AUTOMATED COUNT: 16.6 % (ref 11.5–14.5)
GLOBULIN SER CALC-MCNC: 3.3 G/DL (ref 2–4)
GLUCOSE BLD STRIP.AUTO-MCNC: 250 MG/DL (ref 65–117)
GLUCOSE SERPL-MCNC: 228 MG/DL (ref 65–100)
GLUCOSE UR STRIP.AUTO-MCNC: 100 MG/DL
HCT VFR BLD AUTO: 32.5 % (ref 35–47)
HGB BLD-MCNC: 9.4 G/DL (ref 11.5–16)
HGB UR QL STRIP: NEGATIVE
IMM GRANULOCYTES # BLD AUTO: 0.1 K/UL (ref 0–0.04)
IMM GRANULOCYTES NFR BLD AUTO: 1 % (ref 0–0.5)
KETONES UR QL STRIP.AUTO: NEGATIVE MG/DL
LEUKOCYTE ESTERASE UR QL STRIP.AUTO: NEGATIVE
LYMPHOCYTES # BLD: 1.2 K/UL (ref 0.8–3.5)
LYMPHOCYTES NFR BLD: 9 % (ref 12–49)
MCH RBC QN AUTO: 21 PG (ref 26–34)
MCHC RBC AUTO-ENTMCNC: 28.9 G/DL (ref 30–36.5)
MCV RBC AUTO: 72.5 FL (ref 80–99)
MONOCYTES # BLD: 0.5 K/UL (ref 0–1)
MONOCYTES NFR BLD: 4 % (ref 5–13)
NEUTS SEG # BLD: 11.1 K/UL (ref 1.8–8)
NEUTS SEG NFR BLD: 86 % (ref 32–75)
NITRITE UR QL STRIP.AUTO: NEGATIVE
NRBC # BLD: 0 K/UL (ref 0–0.01)
NRBC BLD-RTO: 0 PER 100 WBC
PH UR STRIP: 7 [PH] (ref 5–8)
PLATELET # BLD AUTO: 281 K/UL (ref 150–400)
PMV BLD AUTO: 9.7 FL (ref 8.9–12.9)
POTASSIUM SERPL-SCNC: 4.2 MMOL/L (ref 3.5–5.1)
PROT SERPL-MCNC: 7 G/DL (ref 6.4–8.2)
PROT UR STRIP-MCNC: NEGATIVE MG/DL
RBC # BLD AUTO: 4.48 M/UL (ref 3.8–5.2)
RBC #/AREA URNS HPF: ABNORMAL /HPF (ref 0–5)
RBC MORPH BLD: ABNORMAL
SERVICE CMNT-IMP: ABNORMAL
SODIUM SERPL-SCNC: 127 MMOL/L (ref 136–145)
SP GR UR REFRACTOMETRY: 1.01 (ref 1–1.03)
TROPONIN I SERPL-MCNC: <0.05 NG/ML
UR CULT HOLD, URHOLD: NORMAL
UROBILINOGEN UR QL STRIP.AUTO: 0.2 EU/DL (ref 0.2–1)
WBC # BLD AUTO: 12.9 K/UL (ref 3.6–11)
WBC URNS QL MICRO: ABNORMAL /HPF (ref 0–4)

## 2021-09-15 PROCEDURE — 73562 X-RAY EXAM OF KNEE 3: CPT

## 2021-09-15 PROCEDURE — 96361 HYDRATE IV INFUSION ADD-ON: CPT

## 2021-09-15 PROCEDURE — 73700 CT LOWER EXTREMITY W/O DYE: CPT

## 2021-09-15 PROCEDURE — 85025 COMPLETE CBC W/AUTO DIFF WBC: CPT

## 2021-09-15 PROCEDURE — 74011250636 HC RX REV CODE- 250/636: Performed by: PHYSICIAN ASSISTANT

## 2021-09-15 PROCEDURE — 84484 ASSAY OF TROPONIN QUANT: CPT

## 2021-09-15 PROCEDURE — 82962 GLUCOSE BLOOD TEST: CPT

## 2021-09-15 PROCEDURE — 70450 CT HEAD/BRAIN W/O DYE: CPT

## 2021-09-15 PROCEDURE — 81001 URINALYSIS AUTO W/SCOPE: CPT

## 2021-09-15 PROCEDURE — 99285 EMERGENCY DEPT VISIT HI MDM: CPT

## 2021-09-15 PROCEDURE — 93005 ELECTROCARDIOGRAM TRACING: CPT

## 2021-09-15 PROCEDURE — 75810000053 HC SPLINT APPLICATION

## 2021-09-15 PROCEDURE — 73610 X-RAY EXAM OF ANKLE: CPT

## 2021-09-15 PROCEDURE — 36415 COLL VENOUS BLD VENIPUNCTURE: CPT

## 2021-09-15 PROCEDURE — 73630 X-RAY EXAM OF FOOT: CPT

## 2021-09-15 PROCEDURE — 80053 COMPREHEN METABOLIC PANEL: CPT

## 2021-09-15 PROCEDURE — 72125 CT NECK SPINE W/O DYE: CPT

## 2021-09-15 PROCEDURE — 96374 THER/PROPH/DIAG INJ IV PUSH: CPT

## 2021-09-15 RX ORDER — NAPROXEN 500 MG/1
500 TABLET ORAL 2 TIMES DAILY WITH MEALS
Qty: 14 TABLET | Refills: 0 | Status: SHIPPED | OUTPATIENT
Start: 2021-09-15 | End: 2021-09-22

## 2021-09-15 RX ORDER — KETOROLAC TROMETHAMINE 30 MG/ML
15 INJECTION, SOLUTION INTRAMUSCULAR; INTRAVENOUS
Status: COMPLETED | OUTPATIENT
Start: 2021-09-15 | End: 2021-09-15

## 2021-09-15 RX ORDER — DILTIAZEM HYDROCHLORIDE 240 MG/1
CAPSULE, EXTENDED RELEASE ORAL
COMMUNITY
Start: 2021-09-03

## 2021-09-15 RX ORDER — HYDROCODONE BITARTRATE AND ACETAMINOPHEN 5; 325 MG/1; MG/1
1 TABLET ORAL
Qty: 6 TABLET | Refills: 0 | Status: SHIPPED | OUTPATIENT
Start: 2021-09-15 | End: 2021-09-18

## 2021-09-15 RX ADMIN — KETOROLAC TROMETHAMINE 15 MG: 30 INJECTION, SOLUTION INTRAMUSCULAR; INTRAVENOUS at 19:34

## 2021-09-15 RX ADMIN — SODIUM CHLORIDE 1000 ML: 9 INJECTION, SOLUTION INTRAVENOUS at 18:07

## 2021-09-15 NOTE — ED TRIAGE NOTES
Pt reports around 1:40 pm \"she thinks her BG dropped and she passed out and she could not get up for an hour. \" Pt reports when she got up she ate two nectarines and crawled into another room and sat in front of a fan and then 20 minutes later ate some Nutella. \" Pt reports she is uncertain of how low her sugar was because she was unable to check it. Pt reports when she fell all her weight went onto her R side. Pt reports R foot pain and knee pain post fall. Pt is uncertain if she hit her head. Pt denies taking blood thinners.

## 2021-09-15 NOTE — ED PROVIDER NOTES
Kassidy Diehl is a 66 y.o. female with PMhx of hypercholesterolemia, GERD, skin cancer, celiac disease, DM, bipolar who presents to ED with cc of syncopal episode prior to arrival.  Patient states around 140 she thinks she her blood sugar dropped, she felt lightheaded and she passed out. Reports she ate to nectarines and crawled to another room and sat in front of the fan. States she ate some Nutella to help raise her blood sugar. States during this time, she bent the needle  and was unable to check her glucose levels. Reports that when she fell, she landed on her right side. States she is unsure if she hit her head. Denies headache, neck pain, being on blood thinners. Endorses right foot pain with bruising, right ankle pain laterally and right knee pain after the fall. She endorses feeling somewhat lightheaded at this time. Denies any chest pain, shortness of breath, dysuria, urinary frequency, constipation, diarrhea. She denies any chest pain, shortness of breath prior to her fall. PMHx: Reviewed, as below. PSHx: Reviewed, as below. PCP: Bri Alfonso MD    There are no other complaints verbalized at this time.                Past Medical History:   Diagnosis Date    [facility]     Cancer (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 melanoma    Carotid bruit 4/12/2011    Chest discomfort 4/12/2011    Diabetes (HCC)     GERD (gastroesophageal reflux disease)     Other ill-defined conditions(799.89)     celiac disease    Psychiatric disorder     depression/anxiety    Pure hypercholesterolemia 4/12/2011       Past Surgical History:   Procedure Laterality Date    HX APPENDECTOMY      HX GYN      tubal ligation    HX GYN      cyst removed from ovary at same time as appendectomy    HX HEENT      skin cancer removed from ear    HX HEENT      sinus surgery    HX HEENT      bilateral cataracts removed    HX ORTHOPAEDIC      back    HX OTHER SURGICAL      colonoscopy x 1 - hx colon polyps    HX WOOD AND BSO      RI CARDIAC SURG PROCEDURE UNLIST      cardiac cath - normal         Family History:   Problem Relation Age of Onset    Breast Cancer Sister     Colon Polyps Other         2 cousins       Social History     Socioeconomic History    Marital status:      Spouse name: Not on file    Number of children: Not on file    Years of education: Not on file    Highest education level: Not on file   Occupational History    Not on file   Tobacco Use    Smoking status: Current Every Day Smoker     Packs/day: 0.50     Years: 30.00     Pack years: 15.00    Smokeless tobacco: Never Used    Tobacco comment: current cigarette smoker   Substance and Sexual Activity    Alcohol use: Yes     Comment: occasional    Drug use: Never    Sexual activity: Not on file   Other Topics Concern    Not on file   Social History Narrative    Not on file     Social Determinants of Health     Financial Resource Strain:     Difficulty of Paying Living Expenses:    Food Insecurity:     Worried About Running Out of Food in the Last Year:     Ran Out of Food in the Last Year:    Transportation Needs:     Lack of Transportation (Medical):  Lack of Transportation (Non-Medical):    Physical Activity:     Days of Exercise per Week:     Minutes of Exercise per Session:    Stress:     Feeling of Stress :    Social Connections:     Frequency of Communication with Friends and Family:     Frequency of Social Gatherings with Friends and Family:     Attends Hinduism Services:     Active Member of Clubs or Organizations:     Attends Club or Organization Meetings:     Marital Status:    Intimate Partner Violence:     Fear of Current or Ex-Partner:     Emotionally Abused:     Physically Abused:     Sexually Abused: ALLERGIES: Codeine, Lactose intolerance [lactase], Morphine, and Sulfa (sulfonamide antibiotics)    Review of Systems   Constitutional: Negative for chills and fever.    HENT: Negative for congestion. Respiratory: Negative for cough and shortness of breath. Cardiovascular: Negative for chest pain. Gastrointestinal: Negative for constipation and diarrhea. Genitourinary: Negative for dysuria and frequency. Musculoskeletal: Positive for arthralgias. Negative for neck pain. Skin: Positive for color change. Neurological: Positive for syncope and light-headedness. Negative for headaches. All other systems reviewed and are negative. Vitals:    09/15/21 1925 09/15/21 1926 09/15/21 1927 09/15/21 2030   BP:    (!) 148/69   Pulse:       Resp:       Temp:       SpO2: 97% 95% 95% 94%   Weight:       Height:                Physical Exam  Vitals and nursing note reviewed. Constitutional:       Appearance: She is not diaphoretic. HENT:      Head: No raccoon eyes or Juarez's sign. Jaw: There is normal jaw occlusion. Comments: NO CSF otorrhea or rhinorrhea. No periocular or occipital bruising. Right Ear: Tympanic membrane, ear canal and external ear normal. No drainage. No hemotympanum. Left Ear: Tympanic membrane, ear canal and external ear normal. No drainage. No hemotympanum. Nose: Nose normal.      Right Nostril: No epistaxis or septal hematoma. Left Nostril: No epistaxis or septal hematoma. Mouth/Throat:      Mouth: Mucous membranes are moist.      Pharynx: Oropharynx is clear. Uvula midline. Eyes:      Extraocular Movements: Extraocular movements intact. Conjunctiva/sclera: Conjunctivae normal.      Pupils: Pupils are equal, round, and reactive to light. Cardiovascular:      Rate and Rhythm: Normal rate and regular rhythm. Heart sounds: Normal heart sounds. No murmur heard. No friction rub. No gallop. Pulmonary:      Effort: No respiratory distress. Breath sounds: No stridor. No wheezing, rhonchi or rales. Abdominal:      General: Bowel sounds are normal. There is no distension. Palpations: Abdomen is soft. Tenderness: There is no abdominal tenderness. Musculoskeletal:         General: Swelling present. Cervical back: Normal range of motion. No rigidity, tenderness or bony tenderness. No spinous process tenderness or muscular tenderness. Comments: Bruising and mild swelling noted to dorsal aspect of right foot. Tenderness to palpation of this area. 2+ dorsalis pedis pulse, warm extremity. Sensation intact throughout as well as distally to digits. Good ROM of ankle and knee noted. ROM digits present with pain. No tenderness to palpation aspects of ankle or knee. No open wound noted to RLE. Skin:     General: Skin is warm and dry. Capillary Refill: Capillary refill takes less than 2 seconds. Neurological:      Mental Status: She is alert and oriented to person, place, and time. GCS: GCS eye subscore is 4. GCS verbal subscore is 5. GCS motor subscore is 6. Cranial Nerves: Cranial nerves are intact. No cranial nerve deficit (2-12), dysarthria or facial asymmetry. Sensory: Sensation is intact. No sensory deficit (grossly to BLUE and BLLE ). Motor: Motor function is intact. No weakness (5/5 strength to biceps, triceps, to flexion/extension knees and ankles ), abnormal muscle tone or seizure activity. Coordination: Coordination normal.          MDM  Number of Diagnoses or Management Options     Amount and/or Complexity of Data Reviewed  Clinical lab tests: ordered and reviewed  Tests in the radiology section of CPT®: ordered and reviewed  Tests in the medicine section of CPT®: ordered and reviewed  Discuss the patient with other providers: yes (Dr. Darvin Mcelroy, ED attending)           Procedures          EKG obtained: 16:35  My interpretation:  Rate: 76 bpm. Rhythm: sinus with sinus arrythmia. Axis: normal. Intervals: normal. No STEMI.             5:47 PM  ED attending in to see and evaluate pt as well. Has updated on results as we have obtained thus far.  Pt strongly declines offered admission. CONSULT NOTE:   5:50 PM  Josefina Valero PA-C spoke with Dr Addison Anand and OPAL EDUARDO,   Specialty: Orthopedics  Discussed pt's hx, disposition, and available diagnostic and imaging results. Reviewed care plans. Dr. Addison Anand obtaining CT while patient is in ER and then having her placed in a Q splint, be nonweightbearing, and follow-up with Dr. Patricia Back. I have been in to see and speak with patient and her daughter and conveyed these recommendations. Patient continues to decline admission. Procedure Note - Splint Assessment:  8:50 PM  Performed by: Josefina Valero PA-C  Splint to patient's right foot was evaluated by Josefina Valero PA-C. Splint in good position. N/V intact after treatment. The procedure took 1-15 minutes, and patient tolerated well. VITAL SIGNS:  Vitals:    09/15/21 1925 09/15/21 1926 09/15/21 1927 09/15/21 2030   BP:    (!) 148/69   Pulse:       Resp:       Temp:       SpO2: 97% 95% 95% 94%   Weight:       Height:             LABS:  Recent Results (from the past 24 hour(s))   GLUCOSE, POC    Collection Time: 09/15/21  4:16 PM   Result Value Ref Range    Glucose (POC) 250 (H) 65 - 117 mg/dL    Performed by Anjum Skinner    CBC WITH AUTOMATED DIFF    Collection Time: 09/15/21  4:27 PM   Result Value Ref Range    WBC 12.9 (H) 3.6 - 11.0 K/uL    RBC 4.48 3.80 - 5.20 M/uL    HGB 9.4 (L) 11.5 - 16.0 g/dL    HCT 32.5 (L) 35.0 - 47.0 %    MCV 72.5 (L) 80.0 - 99.0 FL    MCH 21.0 (L) 26.0 - 34.0 PG    MCHC 28.9 (L) 30.0 - 36.5 g/dL    RDW 16.6 (H) 11.5 - 14.5 %    PLATELET 813 017 - 581 K/uL    MPV 9.7 8.9 - 12.9 FL    NRBC 0.0 0  WBC    ABSOLUTE NRBC 0.00 0.00 - 0.01 K/uL    NEUTROPHILS 86 (H) 32 - 75 %    LYMPHOCYTES 9 (L) 12 - 49 %    MONOCYTES 4 (L) 5 - 13 %    EOSINOPHILS 0 0 - 7 %    BASOPHILS 0 0 - 1 %    IMMATURE GRANULOCYTES 1 (H) 0.0 - 0.5 %    ABS. NEUTROPHILS 11.1 (H) 1.8 - 8.0 K/UL    ABS. LYMPHOCYTES 1.2 0.8 - 3.5 K/UL    ABS.  MONOCYTES 0.5 0.0 - 1.0 K/UL    ABS. EOSINOPHILS 0.0 0.0 - 0.4 K/UL    ABS. BASOPHILS 0.0 0.0 - 0.1 K/UL    ABS. IMM. GRANS. 0.1 (H) 0.00 - 0.04 K/UL    DF SMEAR SCANNED      RBC COMMENTS HYPOCHROMIA  1+        RBC COMMENTS ANISOCYTOSIS  1+        RBC COMMENTS MICROCYTOSIS  1+       METABOLIC PANEL, COMPREHENSIVE    Collection Time: 09/15/21  4:27 PM   Result Value Ref Range    Sodium 127 (L) 136 - 145 mmol/L    Potassium 4.2 3.5 - 5.1 mmol/L    Chloride 90 (L) 97 - 108 mmol/L    CO2 23 21 - 32 mmol/L    Anion gap 14 5 - 15 mmol/L    Glucose 228 (H) 65 - 100 mg/dL    BUN 13 6 - 20 MG/DL    Creatinine 0.74 0.55 - 1.02 MG/DL    BUN/Creatinine ratio 18 12 - 20      GFR est AA >60 >60 ml/min/1.73m2    GFR est non-AA >60 >60 ml/min/1.73m2    Calcium 9.3 8.5 - 10.1 MG/DL    Bilirubin, total 0.2 0.2 - 1.0 MG/DL    ALT (SGPT) 22 12 - 78 U/L    AST (SGOT) 15 15 - 37 U/L    Alk.  phosphatase 153 (H) 45 - 117 U/L    Protein, total 7.0 6.4 - 8.2 g/dL    Albumin 3.7 3.5 - 5.0 g/dL    Globulin 3.3 2.0 - 4.0 g/dL    A-G Ratio 1.1 1.1 - 2.2     TROPONIN I    Collection Time: 09/15/21  4:27 PM   Result Value Ref Range    Troponin-I, Qt. <0.05 <0.05 ng/mL   EKG, 12 LEAD, INITIAL    Collection Time: 09/15/21  4:35 PM   Result Value Ref Range    Ventricular Rate 76 BPM    Atrial Rate 76 BPM    P-R Interval 178 ms    QRS Duration 74 ms    Q-T Interval 386 ms    QTC Calculation (Bezet) 434 ms    Calculated P Axis 45 degrees    Calculated R Axis 25 degrees    Calculated T Axis 89 degrees    Diagnosis       Normal sinus rhythm with sinus arrhythmia  Septal infarct (cited on or before 12-DEC-2019)  Abnormal ECG  When compared with ECG of 04-SEP-2020 11:01,  No significant change was found     URINALYSIS W/MICROSCOPIC    Collection Time: 09/15/21  5:20 PM   Result Value Ref Range    Color YELLOW/STRAW      Appearance HAZY (A) CLEAR      Specific gravity 1.015 1.003 - 1.030      pH (UA) 7.0 5.0 - 8.0      Protein Negative NEG mg/dL    Glucose 100 (A) NEG mg/dL Ketone Negative NEG mg/dL    Bilirubin Negative NEG      Blood Negative NEG      Urobilinogen 0.2 0.2 - 1.0 EU/dL    Nitrites Negative NEG      Leukocyte Esterase Negative NEG      WBC 0-4 0 - 4 /hpf    RBC 0-5 0 - 5 /hpf    Epithelial cells FEW FEW /lpf    Bacteria Negative NEG /hpf   URINE CULTURE HOLD SAMPLE    Collection Time: 09/15/21  5:20 PM    Specimen: Serum; Urine   Result Value Ref Range    Urine culture hold        Urine on hold in Microbiology dept for 2 days. If unpreserved urine is submitted, it cannot be used for addtional testing after 24 hours, recollection will be required. IMAGING:  CT LOW EXT RT WO CONT         CT HEAD WO CONT   Final Result   No acute findings suspected. CT SPINE CERV WO CONT   Final Result   No acute changes suspected. XR KNEE RT 3 V   Final Result   No acute abnormality. XR FOOT RT MIN 3 V   Final Result      Acute intra-articular fracture through the base of the first metatarsal   Acute fractures of the second through fifth metatarsal necks with angulation   and/or impaction      XR ANKLE RT MIN 3 V   Final Result      Small right ankle effusion. No fracture. Nondisplaced fracture through a plantar spur, age indeterminate. Medications During Visit:  Medications   sodium chloride 0.9 % bolus infusion 1,000 mL (0 mL IntraVENous IV Completed 9/15/21 1959)   ketorolac (TORADOL) injection 15 mg (15 mg IntraVENous Given 9/15/21 1934)         DECISION MAKING:    Irlanda Mauricio is a 66 y.o. female who comes in as above. Presents afebrile and hemodynamically stable. Had lightheadedness and syncopal episode earlier today which she attributes to drop in her glucose for which she took PO contents high in sugar. UA without evidence of infection. CBC notable for WBC of 12.9 and hemoglobin of 9.4. Patient noted to have history of low hemoglobin requiring transfusion 1 year ago.  She denies rectal bleeding, dark or tarry stools, hematuria or further known bleeding symptoms at this time. Will have FU with her PCP. Sodium at 127, and glucose at 228, corrected sodium level of 130. She has been given saline while in ED. We have discussed this results and follow-up with her PCP for recheck. EKG and troponin without acute etiology. CT head and cervical spine without acute findings. X-ray knee without acute abnormality. X-ray ankle demonstrating small right ankle effusion without evidence of fracture. X-ray foot demonstrating acute intra-articular fracture through the base of the first metatarsal and acute fractures of the second through fifth metatarsal necks with angulation and/or impaction for which orthopedics has been consulted. They recommend obtaining CT, splint extremity, nonweightbearing and have close follow-up with Dr. Therese Spangler. ED attending has been kept up to date throughout ED stay. Patient has been offered admission, she strongly declines. ED attending and I have each spoken with her and daughter, and they note that they will be able to manage nonweightbearing and further care at home. We have discussed close return precautions. Opportunity for questions has been presented and answered to the best my ability. Patient and daughter verbalized understanding and agreement with care plan. IMPRESSION:  1. Closed displaced fracture of first metatarsal bone of right foot, initial encounter    2. Displaced fracture of second metatarsal bone, right foot, initial encounter for closed fracture    3. Closed displaced fracture of third metatarsal bone of right foot, initial encounter    4. Closed displaced fracture of fourth metatarsal bone of right foot, initial encounter    5. Closed nondisplaced fracture of fifth metatarsal bone of right foot, initial encounter    6.  Fall, initial encounter        DISPOSITION:  Discharged      Current Discharge Medication List      START taking these medications    Details   HYDROcodone-acetaminophen (Norco) 5-325 mg per tablet Take 1 Tablet by mouth every six (6) hours as needed for Pain for up to 3 days. Max Daily Amount: 4 Tablets. Qty: 6 Tablet, Refills: 0  Start date: 9/15/2021, End date: 9/18/2021    Associated Diagnoses: Closed displaced fracture of first metatarsal bone of right foot, initial encounter      naproxen (Naprosyn) 500 mg tablet Take 1 Tablet by mouth two (2) times daily (with meals) for 7 days. Qty: 14 Tablet, Refills: 0  Start date: 9/15/2021, End date: 9/22/2021              Follow-up Information     Follow up With Specialties Details Why [address] [place] [address], [address] Segovia MD Orthopedic Surgery Schedule an appointment as soon as possible for a visit  Please call in the morning to schedule a close follow up of your foot pain and fractures [address] [facility]      [address] [facility] Emergency Medicine Go to  As needed, If symptoms worsen [address] [place] [address] 13 [id number] [phone]    Raul Gentile MD Family Medicine Schedule an appointment as soon as possible for a visit   [address] [place] [address] [phone]              The patient is asked to follow-up with their primary care provider and any other physicians as above. We have discussed strict return precautions and the patient is asked to return promptly for any increased concerns or worsening of symptoms and for return precautions regarding their symptoms today. They can return to this emergency department or any other emergency department. I have discussed with them results as above and presented opportunity for questions. They verbalize their understanding of the above and agreement with care plan. Please note that this dictation was completed with [employer] the [employer] voice recognition software. Quite often unanticipated grammatical, syntax, homophones, and other interpretive errors are inadvertently transcribed by the computer software. Please disregard these errors. Please excuse any errors that have escaped final proofreading.

## 2021-09-16 NOTE — DISCHARGE INSTRUCTIONS
You may use over the counter ibuprofen (Motrin, Advil, Ibuprofen), naproxen (Aleve), or acetaminophen (Tylenol) at home as needed for mild pain, and prescribed pain medication for moderate to severe pain. Take prescribed pain medication with caution, as it may cause drowsiness. Do not take with alcohol, and do not take if you are going to be driving. Additionally, if the medication you are prescribed has Tylenol (acetaminophen)  in it, do not take additional Tylenol within 4 hours as you may ingest too much. Please be very cautious with crutches use such that you do not fall.

## 2021-09-16 NOTE — ED NOTES
Patient  given copy of dc instructions and  script(s). Patient verbalized understanding of instructions and script (s). Patient alert and oriented and in no acute distress.   Patient discharge with daughter

## 2021-09-17 LAB
ATRIAL RATE: 76 BPM
CALCULATED P AXIS, ECG09: 45 DEGREES
CALCULATED R AXIS, ECG10: 25 DEGREES
CALCULATED T AXIS, ECG11: 89 DEGREES
DIAGNOSIS, 93000: NORMAL
P-R INTERVAL, ECG05: 178 MS
Q-T INTERVAL, ECG07: 386 MS
QRS DURATION, ECG06: 74 MS
QTC CALCULATION (BEZET), ECG08: 434 MS
VENTRICULAR RATE, ECG03: 76 BPM

## 2022-03-19 PROBLEM — D64.9 ANEMIA: Status: ACTIVE | Noted: 2020-09-04

## 2022-05-02 ENCOUNTER — TRANSCRIBE ORDER (OUTPATIENT)
Dept: SCHEDULING | Age: 77
End: 2022-05-02

## 2022-05-02 DIAGNOSIS — F17.219 NICOTINE DEPENDENCE, CIGARETTES, WITH UNSPECIFIED NICOTINE-INDUCED DISORDERS: Primary | ICD-10-CM

## 2022-05-03 ENCOUNTER — TRANSCRIBE ORDER (OUTPATIENT)
Dept: SCHEDULING | Age: 77
End: 2022-05-03

## 2022-05-03 DIAGNOSIS — F17.219 NICOTINE DEPENDENCE, CIGARETTES, WITH UNSPECIFIED NICOTINE-INDUCED DISORDERS: Primary | ICD-10-CM

## 2023-04-19 NOTE — ED NOTES
[facility] called for updated ETA. Crew will arrive around 1430.
At bedside with Dr Erica Shahid to do rectal exam, occult blood card sent to lab.  No signs of rectal bleeding at this time
[facility] called for transport. Will call back with ETA.
Patient being transferred out via [facility] stretcher in no sign of distress or discomfort.
TRANSFER - OUT REPORT:    Verbal report given to Susanne Shannon RN(name) on [address] [place] [address] Avenue  being transferred to (unit) for routine progression of care       Report consisted of patients Situation, Background, Assessment and   Recommendations(SBAR). Information from the following report(s) SBAR, Kardex, ED Summary, MAR, Recent Results and Cardiac Rhythm NSR was reviewed with the receiving nurse. Lines:   Peripheral IV 09/04/20 Right Antecubital (Active)   Site Assessment Clean, dry, & intact 09/04/20 1032   Phlebitis Assessment 0 09/04/20 1032   Infiltration Assessment 0 09/04/20 1032   Dressing Status Clean, dry, & intact 09/04/20 1032   Hub Color/Line Status Pink 09/04/20 1032        Opportunity for questions and clarification was provided.       Patient transported with:   Monitor
Yes

## 2023-08-03 ENCOUNTER — HOSPITAL ENCOUNTER (EMERGENCY)
Facility: HOSPITAL | Age: 78
Discharge: HOME | DRG: 065 | End: 2023-08-06
Payer: MEDICARE

## 2023-08-03 ENCOUNTER — HOSPITAL ENCOUNTER (INPATIENT)
Facility: HOSPITAL | Age: 78
LOS: 3 days | Discharge: HOME HEALTH | DRG: 065 | End: 2023-08-06
Attending: STUDENT IN AN ORGANIZED HEALTH CARE EDUCATION/TRAINING PROGRAM | Admitting: HOSPITALIST
Payer: MEDICARE

## 2023-08-03 ENCOUNTER — APPOINTMENT (OUTPATIENT)
Facility: HOSPITAL | Age: 78
DRG: 065 | End: 2023-08-03
Payer: MEDICARE

## 2023-08-03 DIAGNOSIS — R53.1 WEAKNESS: ICD-10-CM

## 2023-08-03 DIAGNOSIS — R20.0 NUMBNESS AND TINGLING: ICD-10-CM

## 2023-08-03 DIAGNOSIS — R20.2 NUMBNESS AND TINGLING: ICD-10-CM

## 2023-08-03 DIAGNOSIS — I67.1 BRAIN ANEURYSM: ICD-10-CM

## 2023-08-03 DIAGNOSIS — I63.9 CEREBROVASCULAR ACCIDENT (CVA), UNSPECIFIED MECHANISM (HCC): Primary | ICD-10-CM

## 2023-08-03 LAB
ALBUMIN SERPL-MCNC: 3.6 G/DL (ref 3.5–5)
ALBUMIN/GLOB SERPL: 1 (ref 1.1–2.2)
ALP SERPL-CCNC: 153 U/L (ref 45–117)
ALT SERPL-CCNC: 17 U/L (ref 12–78)
ANION GAP SERPL CALC-SCNC: 11 MMOL/L (ref 5–15)
AST SERPL-CCNC: 13 U/L (ref 15–37)
BASOPHILS # BLD: 0 K/UL (ref 0–0.1)
BASOPHILS NFR BLD: 0 % (ref 0–1)
BILIRUB SERPL-MCNC: 0.2 MG/DL (ref 0.2–1)
BUN SERPL-MCNC: 10 MG/DL (ref 6–20)
BUN/CREAT SERPL: 15 (ref 12–20)
CALCIUM SERPL-MCNC: 9.4 MG/DL (ref 8.5–10.1)
CHLORIDE SERPL-SCNC: 96 MMOL/L (ref 97–108)
CO2 SERPL-SCNC: 25 MMOL/L (ref 21–32)
CREAT SERPL-MCNC: 0.67 MG/DL (ref 0.55–1.02)
DIFFERENTIAL METHOD BLD: ABNORMAL
EOSINOPHIL # BLD: 0 K/UL (ref 0–0.4)
EOSINOPHIL NFR BLD: 0 % (ref 0–7)
ERYTHROCYTE [DISTWIDTH] IN BLOOD BY AUTOMATED COUNT: 15.9 % (ref 11.5–14.5)
GLOBULIN SER CALC-MCNC: 3.6 G/DL (ref 2–4)
GLUCOSE BLD STRIP.AUTO-MCNC: 128 MG/DL (ref 65–117)
GLUCOSE BLD STRIP.AUTO-MCNC: 156 MG/DL (ref 65–117)
GLUCOSE SERPL-MCNC: 149 MG/DL (ref 65–100)
HCT VFR BLD AUTO: 38.9 % (ref 35–47)
HGB BLD-MCNC: 12.1 G/DL (ref 11.5–16)
IMM GRANULOCYTES # BLD AUTO: 0 K/UL (ref 0–0.04)
IMM GRANULOCYTES NFR BLD AUTO: 0 % (ref 0–0.5)
INR PPP: 1 (ref 0.9–1.1)
LYMPHOCYTES # BLD: 1.7 K/UL (ref 0.8–3.5)
LYMPHOCYTES NFR BLD: 20 % (ref 12–49)
MCH RBC QN AUTO: 24.2 PG (ref 26–34)
MCHC RBC AUTO-ENTMCNC: 31.1 G/DL (ref 30–36.5)
MCV RBC AUTO: 77.8 FL (ref 80–99)
MONOCYTES # BLD: 0.7 K/UL (ref 0–1)
MONOCYTES NFR BLD: 8 % (ref 5–13)
NEUTS SEG # BLD: 6.4 K/UL (ref 1.8–8)
NEUTS SEG NFR BLD: 72 % (ref 32–75)
NRBC # BLD: 0 K/UL (ref 0–0.01)
NRBC BLD-RTO: 0 PER 100 WBC
PLATELET # BLD AUTO: 296 K/UL (ref 150–400)
PMV BLD AUTO: 10.5 FL (ref 8.9–12.9)
POTASSIUM SERPL-SCNC: 4.2 MMOL/L (ref 3.5–5.1)
PROT SERPL-MCNC: 7.2 G/DL (ref 6.4–8.2)
PROTHROMBIN TIME: 9.7 SEC (ref 9–11.1)
RBC # BLD AUTO: 5 M/UL (ref 3.8–5.2)
SERVICE CMNT-IMP: ABNORMAL
SERVICE CMNT-IMP: ABNORMAL
SODIUM SERPL-SCNC: 132 MMOL/L (ref 136–145)
TROPONIN I SERPL HS-MCNC: 6 NG/L (ref 0–51)
WBC # BLD AUTO: 8.9 K/UL (ref 3.6–11)

## 2023-08-03 PROCEDURE — 70450 CT HEAD/BRAIN W/O DYE: CPT

## 2023-08-03 PROCEDURE — 99285 EMERGENCY DEPT VISIT HI MDM: CPT

## 2023-08-03 PROCEDURE — 2060000000 HC ICU INTERMEDIATE R&B

## 2023-08-03 PROCEDURE — 6370000000 HC RX 637 (ALT 250 FOR IP): Performed by: STUDENT IN AN ORGANIZED HEALTH CARE EDUCATION/TRAINING PROGRAM

## 2023-08-03 PROCEDURE — 82962 GLUCOSE BLOOD TEST: CPT

## 2023-08-03 PROCEDURE — 85610 PROTHROMBIN TIME: CPT

## 2023-08-03 PROCEDURE — 85025 COMPLETE CBC W/AUTO DIFF WBC: CPT

## 2023-08-03 PROCEDURE — 36415 COLL VENOUS BLD VENIPUNCTURE: CPT

## 2023-08-03 PROCEDURE — 84484 ASSAY OF TROPONIN QUANT: CPT

## 2023-08-03 PROCEDURE — 80053 COMPREHEN METABOLIC PANEL: CPT

## 2023-08-03 PROCEDURE — 70496 CT ANGIOGRAPHY HEAD: CPT

## 2023-08-03 PROCEDURE — 6360000004 HC RX CONTRAST MEDICATION: Performed by: STUDENT IN AN ORGANIZED HEALTH CARE EDUCATION/TRAINING PROGRAM

## 2023-08-03 RX ORDER — NICOTINE 21 MG/24HR
1 PATCH, TRANSDERMAL 24 HOURS TRANSDERMAL ONCE
Status: COMPLETED | OUTPATIENT
Start: 2023-08-03 | End: 2023-08-04

## 2023-08-03 RX ORDER — BIOTIN 5 MG
10 TABLET ORAL DAILY
Status: DISCONTINUED | OUTPATIENT
Start: 2023-08-04 | End: 2023-08-06 | Stop reason: HOSPADM

## 2023-08-03 RX ORDER — POLYETHYLENE GLYCOL 3350 17 G/17G
17 POWDER, FOR SOLUTION ORAL DAILY PRN
Status: DISCONTINUED | OUTPATIENT
Start: 2023-08-03 | End: 2023-08-06 | Stop reason: HOSPADM

## 2023-08-03 RX ORDER — CLOPIDOGREL BISULFATE 75 MG/1
75 TABLET ORAL DAILY
Status: ON HOLD | COMMUNITY
End: 2023-08-06

## 2023-08-03 RX ORDER — ONDANSETRON 4 MG/1
4 TABLET, ORALLY DISINTEGRATING ORAL EVERY 8 HOURS PRN
Status: DISCONTINUED | OUTPATIENT
Start: 2023-08-03 | End: 2023-08-06 | Stop reason: HOSPADM

## 2023-08-03 RX ORDER — INSULIN LISPRO 100 [IU]/ML
0-4 INJECTION, SOLUTION INTRAVENOUS; SUBCUTANEOUS NIGHTLY
Status: DISCONTINUED | OUTPATIENT
Start: 2023-08-04 | End: 2023-08-06 | Stop reason: HOSPADM

## 2023-08-03 RX ORDER — DILTIAZEM HYDROCHLORIDE 240 MG/1
240 CAPSULE, COATED, EXTENDED RELEASE ORAL DAILY
COMMUNITY

## 2023-08-03 RX ORDER — ASPIRIN 81 MG/1
324 TABLET, CHEWABLE ORAL ONCE
Status: COMPLETED | OUTPATIENT
Start: 2023-08-03 | End: 2023-08-03

## 2023-08-03 RX ORDER — CLOPIDOGREL BISULFATE 75 MG/1
75 TABLET ORAL DAILY
Status: DISCONTINUED | OUTPATIENT
Start: 2023-08-04 | End: 2023-08-06 | Stop reason: HOSPADM

## 2023-08-03 RX ORDER — DOXEPIN HYDROCHLORIDE 25 MG/1
150 CAPSULE ORAL
Status: DISCONTINUED | OUTPATIENT
Start: 2023-08-04 | End: 2023-08-06 | Stop reason: HOSPADM

## 2023-08-03 RX ORDER — SODIUM CHLORIDE 0.9 % (FLUSH) 0.9 %
5-40 SYRINGE (ML) INJECTION EVERY 12 HOURS SCHEDULED
Status: DISCONTINUED | OUTPATIENT
Start: 2023-08-04 | End: 2023-08-06 | Stop reason: HOSPADM

## 2023-08-03 RX ORDER — ACETAMINOPHEN 650 MG/1
650 SUPPOSITORY RECTAL EVERY 6 HOURS PRN
Status: DISCONTINUED | OUTPATIENT
Start: 2023-08-03 | End: 2023-08-06 | Stop reason: HOSPADM

## 2023-08-03 RX ORDER — CLOPIDOGREL BISULFATE 75 MG/1
300 TABLET ORAL ONCE
Status: COMPLETED | OUTPATIENT
Start: 2023-08-03 | End: 2023-08-03

## 2023-08-03 RX ORDER — POLYETHYLENE GLYCOL 3350 17 G/17G
17 POWDER, FOR SOLUTION ORAL DAILY PRN
Status: DISCONTINUED | OUTPATIENT
Start: 2023-08-03 | End: 2023-08-03 | Stop reason: SDUPTHER

## 2023-08-03 RX ORDER — ONDANSETRON 2 MG/ML
4 INJECTION INTRAMUSCULAR; INTRAVENOUS EVERY 6 HOURS PRN
Status: DISCONTINUED | OUTPATIENT
Start: 2023-08-03 | End: 2023-08-06 | Stop reason: HOSPADM

## 2023-08-03 RX ORDER — SODIUM CHLORIDE 9 MG/ML
INJECTION, SOLUTION INTRAVENOUS PRN
Status: DISCONTINUED | OUTPATIENT
Start: 2023-08-03 | End: 2023-08-06 | Stop reason: HOSPADM

## 2023-08-03 RX ORDER — INSULIN LISPRO 100 [IU]/ML
0-4 INJECTION, SOLUTION INTRAVENOUS; SUBCUTANEOUS
Status: DISCONTINUED | OUTPATIENT
Start: 2023-08-04 | End: 2023-08-06 | Stop reason: HOSPADM

## 2023-08-03 RX ORDER — DEXTROSE MONOHYDRATE 100 MG/ML
INJECTION, SOLUTION INTRAVENOUS CONTINUOUS PRN
Status: DISCONTINUED | OUTPATIENT
Start: 2023-08-03 | End: 2023-08-06 | Stop reason: HOSPADM

## 2023-08-03 RX ORDER — DILTIAZEM HYDROCHLORIDE 240 MG/1
240 CAPSULE, COATED, EXTENDED RELEASE ORAL DAILY
Status: DISCONTINUED | OUTPATIENT
Start: 2023-08-04 | End: 2023-08-06 | Stop reason: HOSPADM

## 2023-08-03 RX ORDER — GABAPENTIN 300 MG/1
300 CAPSULE ORAL 3 TIMES DAILY
Status: DISCONTINUED | OUTPATIENT
Start: 2023-08-04 | End: 2023-08-06 | Stop reason: HOSPADM

## 2023-08-03 RX ORDER — ACETAMINOPHEN 325 MG/1
650 TABLET ORAL EVERY 6 HOURS PRN
Status: DISCONTINUED | OUTPATIENT
Start: 2023-08-03 | End: 2023-08-06 | Stop reason: HOSPADM

## 2023-08-03 RX ORDER — ROSUVASTATIN CALCIUM 40 MG/1
40 TABLET, COATED ORAL NIGHTLY
Status: DISCONTINUED | OUTPATIENT
Start: 2023-08-04 | End: 2023-08-06 | Stop reason: HOSPADM

## 2023-08-03 RX ORDER — OXCARBAZEPINE 150 MG/1
900 TABLET, FILM COATED ORAL NIGHTLY
Status: DISCONTINUED | OUTPATIENT
Start: 2023-08-04 | End: 2023-08-06 | Stop reason: HOSPADM

## 2023-08-03 RX ORDER — SODIUM CHLORIDE 0.9 % (FLUSH) 0.9 %
5-40 SYRINGE (ML) INJECTION PRN
Status: DISCONTINUED | OUTPATIENT
Start: 2023-08-03 | End: 2023-08-06 | Stop reason: HOSPADM

## 2023-08-03 RX ORDER — ASPIRIN 81 MG/1
81 TABLET ORAL DAILY
Status: ON HOLD | COMMUNITY
End: 2023-08-06

## 2023-08-03 RX ORDER — ASPIRIN 81 MG/1
81 TABLET ORAL DAILY
Status: DISCONTINUED | OUTPATIENT
Start: 2023-08-04 | End: 2023-08-06 | Stop reason: HOSPADM

## 2023-08-03 RX ADMIN — DOXEPIN HYDROCHLORIDE 150 MG: 25 CAPSULE ORAL at 23:51

## 2023-08-03 RX ADMIN — OXCARBAZEPINE 900 MG: 150 TABLET, FILM COATED ORAL at 23:51

## 2023-08-03 RX ADMIN — GABAPENTIN 300 MG: 300 CAPSULE ORAL at 23:51

## 2023-08-03 RX ADMIN — IOPAMIDOL 100 ML: 755 INJECTION, SOLUTION INTRAVENOUS at 12:09

## 2023-08-03 RX ADMIN — ASPIRIN 324 MG: 81 TABLET, CHEWABLE ORAL at 13:04

## 2023-08-03 RX ADMIN — CLOPIDOGREL BISULFATE 300 MG: 75 TABLET ORAL at 13:04

## 2023-08-03 ASSESSMENT — ENCOUNTER SYMPTOMS
SHORTNESS OF BREATH: 0
VOMITING: 0
EYE DISCHARGE: 0
NAUSEA: 0
DIARRHEA: 0
ABDOMINAL PAIN: 0
EYE REDNESS: 0
RHINORRHEA: 0
COUGH: 0

## 2023-08-03 ASSESSMENT — PAIN - FUNCTIONAL ASSESSMENT: PAIN_FUNCTIONAL_ASSESSMENT: NONE - DENIES PAIN

## 2023-08-03 NOTE — ED NOTES
Verbal report given to [facility]. H in possession of ED summary and ED facesheet. Patient is resting comfortably on stretcher.       Darcie Valladares RN  08/03/23 3100

## 2023-08-03 NOTE — ED NOTES
Patient transported to CT via stretcher w RN. Tele Neuro Screen at bedside.       Nemo Morillo RN  08/03/23 [id number]

## 2023-08-03 NOTE — ED NOTES
Patient has been updated on bed assignment and transportation ETA. Patient is resting quietly on stretcher with belongings and call bell within reach.       Zheng Hartmann RN  08/03/23 2713

## 2023-08-03 NOTE — ED PROVIDER NOTES
[facility] EM[facility]ENCY [facility]  EMERGENCY DEPARTMENT ENCOUNTER      Pt Name: Wilbert Saeed  MRN: [id number]  [address] [facility] 1945  Date of evaluation: 8/3/2023  Provider: Ciro Gibson DO    CHIEF COMPLAINT       Chief Complaint   Patient presents with    Numbness         HISTORY OF PRESENT ILLNESS    HPI    Wilbert Saeed is a 76 y.o. female with a history of diabetes, hyperlipidemia, bipolar disorder, GERD who presents to the emergency department for evaluation of weakness and numbness. Patient notes yesterday at about 12:30 PM while at the grocery store she noticed right face, right arm and leg numbness/tingling as well as weakness in the right arm and leg. States the weakness has persisted, but the numbness/tingling in the leg and face have improved but still persistent in the right arm. Denies any blood thinner use. No other recent illness. Nursing Notes were reviewed. REVIEW OF SYSTEMS       Review of Systems   Constitutional:  Negative for chills and fever. HENT:  Negative for congestion and rhinorrhea. Eyes:  Negative for discharge, redness and visual disturbance. Respiratory:  Negative for cough and shortness of breath. Cardiovascular:  Negative for chest pain. Gastrointestinal:  Negative for abdominal pain, diarrhea, nausea and vomiting. Neurological:  Positive for weakness and numbness. Negative for speech difficulty. Psychiatric/Behavioral:  Negative for agitation.           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

## 2023-08-03 NOTE — ED TRIAGE NOTES
Pt reports R arm numbness and R leg numbness since yesterday at 12:30pm. Pt reports her R leg has slightly improved but her R arm has not improved.

## 2023-08-03 NOTE — ED NOTES
Pt & daughter requesting to speak to ED MD regarding plan of care and questions related to admission. ED MD Pepper at bedside at this time discussing plan of care with patient.       Keturah Medley RN  08/03/23 [id number]

## 2023-08-03 NOTE — ED NOTES
Bedside shift change report given to Lanie Powell RN (oncoming nurse) by Nirali Jiménez RN (offgoing nurse). Report included the following information Nurse Handoff Report, ED Encounter Summary, ED SBAR, and MAR.          H2H ETA 1648       Edinson oRberson RN  08/03/23 5950

## 2023-08-04 ENCOUNTER — APPOINTMENT (OUTPATIENT)
Facility: HOSPITAL | Age: 78
DRG: 065 | End: 2023-08-04
Payer: MEDICARE

## 2023-08-04 LAB
CHOLEST SERPL-MCNC: 235 MG/DL
ERYTHROCYTE [DISTWIDTH] IN BLOOD BY AUTOMATED COUNT: 15.3 % (ref 11.5–14.5)
EST. AVERAGE GLUCOSE BLD GHB EST-MCNC: 131 MG/DL
GLUCOSE BLD STRIP.AUTO-MCNC: 120 MG/DL (ref 65–117)
GLUCOSE BLD STRIP.AUTO-MCNC: 128 MG/DL (ref 65–117)
GLUCOSE BLD STRIP.AUTO-MCNC: 140 MG/DL (ref 65–117)
GLUCOSE BLD STRIP.AUTO-MCNC: 93 MG/DL (ref 65–117)
HBA1C MFR BLD: 6.2 % (ref 4–5.6)
HCT VFR BLD AUTO: 35.3 % (ref 35–47)
HDLC SERPL-MCNC: 48 MG/DL
HDLC SERPL: 4.9 (ref 0–5)
HGB BLD-MCNC: 10.6 G/DL (ref 11.5–16)
LDLC SERPL CALC-MCNC: 119.4 MG/DL (ref 0–100)
MCH RBC QN AUTO: 23.6 PG (ref 26–34)
MCHC RBC AUTO-ENTMCNC: 30 G/DL (ref 30–36.5)
MCV RBC AUTO: 78.6 FL (ref 80–99)
NRBC # BLD: 0 K/UL (ref 0–0.01)
NRBC BLD-RTO: 0 PER 100 WBC
PLATELET # BLD AUTO: 255 K/UL (ref 150–400)
PMV BLD AUTO: 10.6 FL (ref 8.9–12.9)
RBC # BLD AUTO: 4.49 M/UL (ref 3.8–5.2)
SERVICE CMNT-IMP: ABNORMAL
SERVICE CMNT-IMP: NORMAL
TRIGL SERPL-MCNC: 338 MG/DL
TROPONIN I SERPL HS-MCNC: 9 NG/L (ref 0–51)
TSH SERPL DL<=0.05 MIU/L-ACNC: 2.32 UIU/ML (ref 0.36–3.74)
VLDLC SERPL CALC-MCNC: 67.6 MG/DL
WBC # BLD AUTO: 7.4 K/UL (ref 3.6–11)

## 2023-08-04 PROCEDURE — 6370000000 HC RX 637 (ALT 250 FOR IP): Performed by: STUDENT IN AN ORGANIZED HEALTH CARE EDUCATION/TRAINING PROGRAM

## 2023-08-04 PROCEDURE — 80061 LIPID PANEL: CPT

## 2023-08-04 PROCEDURE — 97161 PT EVAL LOW COMPLEX 20 MIN: CPT

## 2023-08-04 PROCEDURE — 97112 NEUROMUSCULAR REEDUCATION: CPT

## 2023-08-04 PROCEDURE — 70551 MRI BRAIN STEM W/O DYE: CPT

## 2023-08-04 PROCEDURE — 85027 COMPLETE CBC AUTOMATED: CPT

## 2023-08-04 PROCEDURE — 97116 GAIT TRAINING THERAPY: CPT

## 2023-08-04 PROCEDURE — 82962 GLUCOSE BLOOD TEST: CPT

## 2023-08-04 PROCEDURE — 84484 ASSAY OF TROPONIN QUANT: CPT

## 2023-08-04 PROCEDURE — 99223 1ST HOSP IP/OBS HIGH 75: CPT | Performed by: NURSE PRACTITIONER

## 2023-08-04 PROCEDURE — 84443 ASSAY THYROID STIM HORMONE: CPT

## 2023-08-04 PROCEDURE — 2580000003 HC RX 258: Performed by: STUDENT IN AN ORGANIZED HEALTH CARE EDUCATION/TRAINING PROGRAM

## 2023-08-04 PROCEDURE — 36415 COLL VENOUS BLD VENIPUNCTURE: CPT

## 2023-08-04 PROCEDURE — 2060000000 HC ICU INTERMEDIATE R&B

## 2023-08-04 PROCEDURE — 97165 OT EVAL LOW COMPLEX 30 MIN: CPT

## 2023-08-04 PROCEDURE — 83036 HEMOGLOBIN GLYCOSYLATED A1C: CPT

## 2023-08-04 PROCEDURE — 6360000002 HC RX W HCPCS: Performed by: HOSPITALIST

## 2023-08-04 RX ORDER — ENOXAPARIN SODIUM 100 MG/ML
40 INJECTION SUBCUTANEOUS DAILY
Status: DISCONTINUED | OUTPATIENT
Start: 2023-08-04 | End: 2023-08-06 | Stop reason: HOSPADM

## 2023-08-04 RX ADMIN — CLOPIDOGREL BISULFATE 75 MG: 75 TABLET ORAL at 08:40

## 2023-08-04 RX ADMIN — GABAPENTIN 300 MG: 300 CAPSULE ORAL at 13:54

## 2023-08-04 RX ADMIN — GABAPENTIN 300 MG: 300 CAPSULE ORAL at 22:02

## 2023-08-04 RX ADMIN — DILTIAZEM HYDROCHLORIDE 240 MG: 240 CAPSULE, EXTENDED RELEASE ORAL at 08:38

## 2023-08-04 RX ADMIN — SODIUM CHLORIDE, PRESERVATIVE FREE 5 ML: 5 INJECTION INTRAVENOUS at 08:42

## 2023-08-04 RX ADMIN — ENOXAPARIN SODIUM 40 MG: 100 INJECTION SUBCUTANEOUS at 18:03

## 2023-08-04 RX ADMIN — SODIUM CHLORIDE, PRESERVATIVE FREE 10 ML: 5 INJECTION INTRAVENOUS at 08:40

## 2023-08-04 RX ADMIN — SODIUM CHLORIDE, PRESERVATIVE FREE 10 ML: 5 INJECTION INTRAVENOUS at 22:02

## 2023-08-04 RX ADMIN — GABAPENTIN 300 MG: 300 CAPSULE ORAL at 08:38

## 2023-08-04 RX ADMIN — DOXEPIN HYDROCHLORIDE 150 MG: 25 CAPSULE ORAL at 22:02

## 2023-08-04 RX ADMIN — OXCARBAZEPINE 900 MG: 150 TABLET, FILM COATED ORAL at 22:02

## 2023-08-04 RX ADMIN — Medication 10 MG: at 08:39

## 2023-08-04 RX ADMIN — ASPIRIN 81 MG: 81 TABLET, COATED ORAL at 08:38

## 2023-08-04 NOTE — CONSULTS
NEUROLOGY CONSULT NOTE    Name Berry Ortiz Age 76 y.o. MRN [id number]  1945     Consulting Physician: Kenyetta Bright MD      Chief Complaint:  R face, arm, leg numbness and weakness      Assessment:     Principal Problem:    Acute CVA (cerebrovascular accident) [facility])  Resolved Problems:    * No resolved hospital problems. *    Patient is a 76year-old male with history of bipolar disorder, HTN, DM2, and afib not on anticoagulation who presents after having numbness/tingling to R face, arm and leg that began 2 days ago with RUE weakness. She currently only has residual RUE tingling. She has had multiple syncopal episodes with falls at home and dizziness with standing at baseline over the past year. She went to OSH 4 days ago after a syncopal fall and was sent home with diagnosis of R toe fractures. She is not on anticoagulation at baseline but did have outpatient cardiac monitoring that showed afib according to her. Patient and daughter are unaware why she is not on [address] It is unclear if she was taking aspirin at home. CTA head/neck with moderate stenosis of L CCA, mod-severe stenosis of vertebral artery, findings of CAROLYN aneurysm, R carotid terminus aneurysm. MRI brain with small acute infarct L thalamus. .4, , A1c 6.2    Recommendations:   1.) Acute, small L MCA infarct:  - Etiology cardioembolic given h/o afib versus vessel to vessel from L carotid stenosis   - Continue DAPT with aspirin 81 mg and Plavix 75 mg daily indefinitely   - Continue atorvastatin 80 mg daily for goal LDL <70  - A1c at goal  - [id number] score 6, need to discuss risk versus benefit of starting [address] with falls history. At this time will hold and continue DAPT. Can continue further discussion outpatient. Daughter made aware. - TTE pending  - Allow permissive HTN SBP<180  - No interventional plan per NIS for aneurysms and vert stenosis   - PT, OT consults    TTE pending. Otherwise, no further recommendations.
daily and clopidogrel 75mg daily per Neurology recommendations  Pt denies tobacco use  Stoke education  PT/OT/SLP evals  Follow-up w/ NIS in clinic to discuss treatment options. Appointment to be made, see details on discharge instructions. I have discussed the diagnosis and the intended plan as seen in the above orders with Dr. Yola Bauer.   Thank you for this consult and participating in the care of this patient. I have spent 50 minutes of time involved in lab review, imaging review, consultations with specialists and attendings, family discussion/decision making and documentation.      Signed By: TANO Combs - NP, Neurocritical Care Nurse Practitioner    August 4, 2023

## 2023-08-04 NOTE — H&P
hour(s))   POCT Glucose    Collection Time: 08/03/23 11:52 AM   Result Value Ref Range    POC Glucose 156 (H) 65 - 117 mg/dL    Performed by: Arturo Brown([facility])    CBC with Auto Differential    Collection Time: 08/03/23 11:57 AM   Result Value Ref Range    WBC 8.9 3.6 - 11.0 K/uL    RBC 5.00 3.80 - 5.20 M/uL    Hemoglobin 12.1 11.5 - 16.0 g/dL    Hematocrit 38.9 35.0 - 47.0 %    MCV 77.8 (L) 80.0 - 99.0 FL    MCH 24.2 (L) 26.0 - 34.0 PG    MCHC 31.1 30.0 - 36.5 g/dL    RDW 15.9 (H) 11.5 - 14.5 %    Platelets 389 480 - 452 K/uL    MPV 10.5 8.9 - 12.9 FL    Nucleated RBCs 0.0 0  WBC    nRBC 0.00 0.00 - 0.01 K/uL    Neutrophils % 72 32 - 75 %    Lymphocytes % 20 12 - 49 %    Monocytes % 8 5 - 13 %    Eosinophils % 0 0 - 7 %    Basophils % 0 0 - 1 %    Immature Granulocytes 0 0.0 - 0.5 %    Neutrophils Absolute 6.4 1.8 - 8.0 K/UL    Lymphocytes Absolute 1.7 0.8 - 3.5 K/UL    Monocytes Absolute 0.7 0.0 - 1.0 K/UL    Eosinophils Absolute 0.0 0.0 - 0.4 K/UL    Basophils Absolute 0.0 0.0 - 0.1 K/UL    Absolute Immature Granulocyte 0.0 0.00 - 0.04 K/UL    Differential Type AUTOMATED     Comprehensive Metabolic Panel    Collection Time: 08/03/23 11:57 AM   Result Value Ref Range    Sodium 132 (L) 136 - 145 mmol/L    Potassium 4.2 3.5 - 5.1 mmol/L    Chloride 96 (L) 97 - 108 mmol/L    CO2 25 21 - 32 mmol/L    Anion Gap 11 5 - 15 mmol/L    Glucose 149 (H) 65 - 100 mg/dL    BUN 10 6 - 20 MG/DL    Creatinine 0.67 0.55 - 1.02 MG/DL    Bun/Cre Ratio 15 12 - 20      Est, Glom Filt Rate >60 >60 ml/min/1.73m2    Calcium 9.4 8.5 - 10.1 MG/DL    Total Bilirubin 0.2 0.2 - 1.0 MG/DL    ALT 17 12 - 78 U/L    AST 13 (L) 15 - 37 U/L    Alk Phosphatase 153 (H) 45 - 117 U/L    Total Protein 7.2 6.4 - 8.2 g/dL    Albumin 3.6 3.5 - 5.0 g/dL    Globulin 3.6 2.0 - 4.0 g/dL    Albumin/Globulin Ratio 1.0 (L) 1.1 - 2.2     Troponin    Collection Time: 08/03/23 11:57 AM   Result Value Ref Range    Troponin, High Sensitivity 6 0 - 51

## 2023-08-04 NOTE — CARE COORDINATION
Care Management Initial Assessment       RUR:11%  Readmission? No  1st IM letter given? Yes     CM met with patient at bedside to introduce self and role. ADLs: independent  DME: shower chair, RW, WC - does not use either at baseline  PCP follow up: Dr. Sidney Oropeza scheduled in Oct  Previous Home Health: None  Previous [address]: None  Previous Inpatient Rehab: None  Insurance verified: Medicare A&B and [facility] Supplement  Pharmacy: [address] [facility]  Emergency Contact: Panchito Law [phone]    CM will follow patient progress and assist as needed with AMANDA plan. 08/04/23 1206   Service Assessment   Patient Orientation Alert and Oriented;Person;Place;Situation;Self   Cognition Alert   History Provided By Patient   Primary [facility]   Patient's Healthcare Decision Maker is: Legal Next of Kin  (daughter Alvaro Matters)   PCP Verified by CM Yes   Last Visit to PCP Within last 6 months  (Appt on Oct)   Prior Functional Level Independent in ADLs/IADLs   Current Functional Level Independent in ADLs/IADLs   Can patient return to prior living arrangement Yes   Ability to make needs known: Good   Family able to assist with home care needs: No   Would you like for me to discuss the discharge plan with any other family members/significant others, and if so, who? No   Financial Resources Medicare   Social/Functional History   Lives With Alone   Type of [address] [place] [address]  Two level   Home Access Stairs to enter with rails   Entrance Stairs - Number of Steps [address] chair   Home Equipment Walker, rolling; Wheelchair-manual  (stair lift)   ADL Assistance Independent   Homemaking Assistance Independent   Homemaking Responsibilities Yes   Ambulation Assistance Independent   Transfer Assistance Independent   Active  Yes   Discharge Planning   Type of Residence House   Living Arrangements Alone   Current Services Prior To Admission Durable

## 2023-08-04 NOTE — DISCHARGE INSTRUCTIONS
The patient needs to follow-up in the Neurology clinic in 4-6 weeks from discharge. The Neurology clinic will call the patient and/or family to schedule an appointment after discharge within the next week. The patient can be seen at the [facility] Neurology clinic or another Neurology clinic location. Please contact the clinic in the interim if any questions/concerns 21 [phone]. Discharge Instructions       PATIENT ID: Wilbert Saeed  MRN: [id number]   YOB: 1945    DATE OF ADMISSION: [unfilled]    DATE OF DISCHARGE: 8/6/2023    PRIMARY CARE PROVIDER: @PCP@     ATTENDING PHYSICIAN: [unfilled]  DISCHARGING PROVIDER: Raymundo Pickering MD    To contact this individual call [phone] and ask the  to page. If unavailable ask to be transferred the Adult Hospitalist Department. DISCHARGE DIAGNOSES acute CVA     CONSULTATIONS: [unfilled]    PROCEDURES/SURGERIES: * No surgery found *    PENDING TEST RESULTS:   At the time of discharge the following test results are still pending: none     [facility]MENTS[facility]   [facility]     ADDITIONAL CARE RECOMMENDATIONS:   Should follow up your cardiologist, should further discuss the options of anticoagulation with your cardiologist in the office  Fall precaution   Follow up neuro clinic in 4-6 weeks  Can follow up neuro interventionist 2-3 weeks regarding your small brain aneurysm   Should follow up your primary care physician in 1-2 weeks regarding your blood pressure and cholesterol level. Free water restriction, no more than 1.4 liter of free water  a day     DIET: diabetic diet      ACTIVITY: activity as tolerated        EQUIPMENT needed: rw       Radiology      DISCHARGE MEDICATIONS:   See Medication Reconciliation Form    It is important that you take the medication exactly as they are prescribed. Keep your medication in the bottles provided by the pharmacist and keep a list of the medication names, dosages, and times to be taken in your wallet.    Do

## 2023-08-05 ENCOUNTER — APPOINTMENT (OUTPATIENT)
Facility: HOSPITAL | Age: 78
DRG: 065 | End: 2023-08-05
Payer: MEDICARE

## 2023-08-05 LAB
GLUCOSE BLD STRIP.AUTO-MCNC: 124 MG/DL (ref 65–117)
GLUCOSE BLD STRIP.AUTO-MCNC: 128 MG/DL (ref 65–117)
GLUCOSE BLD STRIP.AUTO-MCNC: 178 MG/DL (ref 65–117)
GLUCOSE BLD STRIP.AUTO-MCNC: 227 MG/DL (ref 65–117)
SERVICE CMNT-IMP: ABNORMAL

## 2023-08-05 PROCEDURE — 2060000000 HC ICU INTERMEDIATE R&B

## 2023-08-05 PROCEDURE — 6370000000 HC RX 637 (ALT 250 FOR IP): Performed by: STUDENT IN AN ORGANIZED HEALTH CARE EDUCATION/TRAINING PROGRAM

## 2023-08-05 PROCEDURE — 93308 TTE F-UP OR LMTD: CPT

## 2023-08-05 PROCEDURE — 2580000003 HC RX 258: Performed by: STUDENT IN AN ORGANIZED HEALTH CARE EDUCATION/TRAINING PROGRAM

## 2023-08-05 PROCEDURE — 6360000002 HC RX W HCPCS: Performed by: HOSPITALIST

## 2023-08-05 PROCEDURE — 94760 N-INVAS EAR/PLS OXIMETRY 1: CPT

## 2023-08-05 PROCEDURE — 82962 GLUCOSE BLOOD TEST: CPT

## 2023-08-05 RX ADMIN — GABAPENTIN 300 MG: 300 CAPSULE ORAL at 13:59

## 2023-08-05 RX ADMIN — DOXEPIN HYDROCHLORIDE 150 MG: 25 CAPSULE ORAL at 22:26

## 2023-08-05 RX ADMIN — SODIUM CHLORIDE, PRESERVATIVE FREE 10 ML: 5 INJECTION INTRAVENOUS at 09:35

## 2023-08-05 RX ADMIN — GABAPENTIN 300 MG: 300 CAPSULE ORAL at 22:26

## 2023-08-05 RX ADMIN — DILTIAZEM HYDROCHLORIDE 240 MG: 240 CAPSULE, EXTENDED RELEASE ORAL at 09:33

## 2023-08-05 RX ADMIN — ASPIRIN 81 MG: 81 TABLET, COATED ORAL at 09:33

## 2023-08-05 RX ADMIN — OXCARBAZEPINE 900 MG: 150 TABLET, FILM COATED ORAL at 22:26

## 2023-08-05 RX ADMIN — ENOXAPARIN SODIUM 40 MG: 100 INJECTION SUBCUTANEOUS at 09:33

## 2023-08-05 RX ADMIN — CLOPIDOGREL BISULFATE 75 MG: 75 TABLET ORAL at 09:33

## 2023-08-05 RX ADMIN — SODIUM CHLORIDE, PRESERVATIVE FREE 10 ML: 5 INJECTION INTRAVENOUS at 22:26

## 2023-08-05 RX ADMIN — Medication 10 MG: at 09:33

## 2023-08-05 RX ADMIN — GABAPENTIN 300 MG: 300 CAPSULE ORAL at 09:33

## 2023-08-05 NOTE — CARE COORDINATION
TRANSITION OF CARE  RUR--11%  Disposition--IPR versus home health. Transport--Family    Patient's primary family contact: daughter Cristina Mustafa [phone]    1st  Medicare IM Letter: 8/3/23  2nd Medicare IM Letter:      met with patient and daughter Neil Herrera. Patient lives alone with various types of safety equipment, including a stair lift. Patient clearly declined option of IPR. She clearly prefers discharge with home health. Plan made for patient to discuss home health option with the hospitalist, as well requesting an order for a rolling walker (regular walker is reported too difficult to use).

## 2023-08-06 VITALS
WEIGHT: 150.13 LBS | SYSTOLIC BLOOD PRESSURE: 155 MMHG | TEMPERATURE: 98.6 F | RESPIRATION RATE: 17 BRPM | OXYGEN SATURATION: 99 % | HEIGHT: 61 IN | BODY MASS INDEX: 28.35 KG/M2 | HEART RATE: 87 BPM | DIASTOLIC BLOOD PRESSURE: 69 MMHG

## 2023-08-06 LAB
ECHO AO ASC DIAM: 3.1 CM
ECHO AO ASCENDING AORTA INDEX: 1.87 CM/M2
ECHO AO ROOT DIAM: 2.8 CM
ECHO AO ROOT INDEX: 1.69 CM/M2
ECHO AR MAX VEL PISA: 3.8 M/S
ECHO AV AREA PEAK VELOCITY: 2.2 CM2
ECHO AV AREA/BSA PEAK VELOCITY: 1.3 CM2/M2
ECHO AV PEAK GRADIENT: 11 MMHG
ECHO AV PEAK VELOCITY: 1.7 M/S
ECHO AV REGURGITANT PHT: 576.7 MILLISECOND
ECHO AV VELOCITY RATIO: 0.71
ECHO BSA: 1.7 M2
ECHO EST RA PRESSURE: 8 MMHG
ECHO LA VOL 2C: 53 ML (ref 22–52)
ECHO LA VOL 2C: 55 ML (ref 22–52)
ECHO LA VOL 4C: 51 ML (ref 22–52)
ECHO LA VOL 4C: 56 ML (ref 22–52)
ECHO LA VOLUME AREA LENGTH: 55 ML
ECHO LA VOLUME INDEX AREA LENGTH: 33 ML/M2 (ref 16–34)
ECHO LV E' LATERAL VELOCITY: 7 CM/S
ECHO LV E' SEPTAL VELOCITY: 6 CM/S
ECHO LV EDV A2C: 63 ML
ECHO LV EDV A4C: 73 ML
ECHO LV EDV BP: 68 ML (ref 56–104)
ECHO LV EDV INDEX A4C: 44 ML/M2
ECHO LV EDV INDEX BP: 41 ML/M2
ECHO LV EDV NDEX A2C: 38 ML/M2
ECHO LV EJECTION FRACTION A2C: 73 %
ECHO LV EJECTION FRACTION A4C: 64 %
ECHO LV EJECTION FRACTION BIPLANE: 68 % (ref 55–100)
ECHO LV ESV A2C: 17 ML
ECHO LV ESV A4C: 26 ML
ECHO LV ESV BP: 22 ML (ref 19–49)
ECHO LV ESV INDEX A2C: 10 ML/M2
ECHO LV ESV INDEX A4C: 16 ML/M2
ECHO LV ESV INDEX BP: 13 ML/M2
ECHO LV FRACTIONAL SHORTENING: 49 % (ref 28–44)
ECHO LV INTERNAL DIMENSION DIASTOLE INDEX: 2.47 CM/M2
ECHO LV INTERNAL DIMENSION DIASTOLIC MMODE: 4.4 CM (ref 3.9–5.3)
ECHO LV INTERNAL DIMENSION DIASTOLIC: 4.1 CM (ref 3.9–5.3)
ECHO LV INTERNAL DIMENSION SYSTOLIC INDEX: 1.27 CM/M2
ECHO LV INTERNAL DIMENSION SYSTOLIC MMODE: 2.1 CM
ECHO LV INTERNAL DIMENSION SYSTOLIC: 2.1 CM
ECHO LV IVSD MMODE: 1.1 CM (ref 0.6–0.9)
ECHO LV IVSD: 1 CM (ref 0.6–0.9)
ECHO LV MASS 2D: 123 G (ref 67–162)
ECHO LV MASS INDEX 2D: 74.1 G/M2 (ref 43–95)
ECHO LV POSTERIOR WALL DIASTOLIC MMODE: 1.1 CM (ref 0.6–0.9)
ECHO LV POSTERIOR WALL DIASTOLIC: 0.9 CM (ref 0.6–0.9)
ECHO LV RELATIVE WALL THICKNESS RATIO: 0.44
ECHO LVOT AREA: 3.1 CM2
ECHO LVOT DIAM: 2 CM
ECHO LVOT PEAK GRADIENT: 6 MMHG
ECHO LVOT PEAK VELOCITY: 1.2 M/S
ECHO MV A VELOCITY: 1.5 M/S
ECHO MV AREA PHT: 3 CM2
ECHO MV E DECELERATION TIME (DT): 254.5 MS
ECHO MV E VELOCITY: 0.75 M/S
ECHO MV E/A RATIO: 0.5
ECHO MV E/E' LATERAL: 10.71
ECHO MV E/E' RATIO (AVERAGED): 11.61
ECHO MV E/E' SEPTAL: 12.5
ECHO MV MAX VELOCITY: 1.9 M/S
ECHO MV MEAN GRADIENT: 4 MMHG
ECHO MV MEAN VELOCITY: 1 M/S
ECHO MV PEAK GRADIENT: 14 MMHG
ECHO MV PRESSURE HALF TIME (PHT): 73.8 MS
ECHO MV REGURGITANT PEAK GRADIENT: 31 MMHG
ECHO MV REGURGITANT PEAK VELOCITY: 2.8 M/S
ECHO MV VTI: 31.6 CM
ECHO PV ACCELERATION TIME (AT): 93.2 MS
ECHO RIGHT VENTRICULAR SYSTOLIC PRESSURE (RVSP): 26 MMHG
ECHO RV FREE WALL PEAK S': 13 CM/S
ECHO RV TAPSE: 1.9 CM (ref 1.7–?)
ECHO TV REGURGITANT MAX VELOCITY: 2.11 M/S
ECHO TV REGURGITANT PEAK GRADIENT: 18 MMHG
GLUCOSE BLD STRIP.AUTO-MCNC: 128 MG/DL (ref 65–117)
SERVICE CMNT-IMP: ABNORMAL

## 2023-08-06 PROCEDURE — 6360000002 HC RX W HCPCS: Performed by: HOSPITALIST

## 2023-08-06 PROCEDURE — 82962 GLUCOSE BLOOD TEST: CPT

## 2023-08-06 PROCEDURE — 6370000000 HC RX 637 (ALT 250 FOR IP): Performed by: STUDENT IN AN ORGANIZED HEALTH CARE EDUCATION/TRAINING PROGRAM

## 2023-08-06 PROCEDURE — 2580000003 HC RX 258: Performed by: STUDENT IN AN ORGANIZED HEALTH CARE EDUCATION/TRAINING PROGRAM

## 2023-08-06 PROCEDURE — 6370000000 HC RX 637 (ALT 250 FOR IP): Performed by: HOSPITALIST

## 2023-08-06 RX ORDER — EZETIMIBE 10 MG/1
10 TABLET ORAL DAILY
COMMUNITY

## 2023-08-06 RX ORDER — LOSARTAN POTASSIUM 100 MG/1
100 TABLET ORAL DAILY
Qty: 30 TABLET | Refills: 1 | Status: SHIPPED | OUTPATIENT
Start: 2023-08-07

## 2023-08-06 RX ORDER — ASPIRIN 81 MG/1
81 TABLET ORAL DAILY
Qty: 30 TABLET | Refills: 3 | Status: SHIPPED | OUTPATIENT
Start: 2023-08-07

## 2023-08-06 RX ORDER — ROSUVASTATIN CALCIUM 20 MG/1
20 TABLET, COATED ORAL NIGHTLY
Qty: 30 TABLET | Refills: 1 | Status: SHIPPED | OUTPATIENT
Start: 2023-08-06 | End: 2023-08-06 | Stop reason: HOSPADM

## 2023-08-06 RX ORDER — CLOPIDOGREL BISULFATE 75 MG/1
75 TABLET ORAL DAILY
Qty: 30 TABLET | Refills: 2 | Status: CANCELLED | OUTPATIENT
Start: 2023-08-06

## 2023-08-06 RX ORDER — ASPIRIN 81 MG/1
81 TABLET ORAL DAILY
Qty: 90 TABLET | Refills: 3 | Status: CANCELLED | OUTPATIENT
Start: 2023-08-06

## 2023-08-06 RX ORDER — LOSARTAN POTASSIUM 50 MG/1
100 TABLET ORAL DAILY
Status: DISCONTINUED | OUTPATIENT
Start: 2023-08-06 | End: 2023-08-06 | Stop reason: HOSPADM

## 2023-08-06 RX ORDER — CLOPIDOGREL BISULFATE 75 MG/1
75 TABLET ORAL DAILY
Qty: 30 TABLET | Refills: 3 | Status: SHIPPED | OUTPATIENT
Start: 2023-08-06

## 2023-08-06 RX ORDER — GLIPIZIDE 5 MG/1
5 TABLET, FILM COATED, EXTENDED RELEASE ORAL 2 TIMES DAILY
COMMUNITY

## 2023-08-06 RX ADMIN — LOSARTAN POTASSIUM 100 MG: 50 TABLET, FILM COATED ORAL at 09:35

## 2023-08-06 RX ADMIN — GABAPENTIN 300 MG: 300 CAPSULE ORAL at 09:35

## 2023-08-06 RX ADMIN — CLOPIDOGREL BISULFATE 75 MG: 75 TABLET ORAL at 09:35

## 2023-08-06 RX ADMIN — Medication 10 MG: at 09:34

## 2023-08-06 RX ADMIN — SODIUM CHLORIDE, PRESERVATIVE FREE 10 ML: 5 INJECTION INTRAVENOUS at 09:36

## 2023-08-06 RX ADMIN — DILTIAZEM HYDROCHLORIDE 240 MG: 240 CAPSULE, EXTENDED RELEASE ORAL at 09:35

## 2023-08-06 RX ADMIN — ASPIRIN 81 MG: 81 TABLET, COATED ORAL at 09:35

## 2023-08-06 RX ADMIN — ENOXAPARIN SODIUM 40 MG: 100 INJECTION SUBCUTANEOUS at 09:35

## 2023-08-06 ASSESSMENT — PAIN SCALES - GENERAL: PAINLEVEL_OUTOF10: 0

## 2023-08-06 NOTE — DISCHARGE SUMMARY
Deconditioned     Independent      Cognition    X Lucid     Forgetful     Dementia      Catheters/lines (plus indication)    Sandhu     PICC     PEG    X None      Code status    X Full code     DNR        CHRONIC MEDICAL DIAGNOSES:      Greater than 31 minutes were spent with the patient on counseling and coordination of care    Signed:   Travon Hoskins MD  8/6/2023  11:47 AM

## 2023-08-06 NOTE — PROGRESS NOTES
Bedside RN performed patient education and medication education. Discharge concerns initiated and discussed with patient, including clarification on \"who\" assists the patient at their home and instructions for when the home going patient should call their provider after discharge. Opportunity for questions and clarification was provided. Patient receptive to education:Yes  Patient stated: I understand  Barriers to Education: None  Diagnosis Education given:  Yes    Length of stay: 3  Expected Day of Discharge: 8/6/23  Ask if they have \"Help at Home\" & add to white board?   Yes    Education Day #: 3    Medication Education Given:  Yes  M in the box Medication name: plavix    Pt aware of HCAHPS survey: Yes          Stroke Education documented in Patient Education: Yes  Core Measures Documented in Connect Care:  Risk Factors: Yes  Warning signs of stroke: Yes  When to Activate 911: Yes  Medication Education for Risk Factors: Yes  Smoking cessation if applicable: Yes  Written Education Given:  Yes    Discharge NIH Completed: Yes  Score: 1    BRAINS: No    Follow Up Appointment Made: No  Date/Time if applicable: patient to make appointment
Spiritual Care Assessment/Progress Note  [facility]    Name: Soila George MRN: [id number]    Age: 76 y.o. Sex: female   Language: English     Date: 8/3/2023            Total Time Calculated: 26 min              Spiritual Assessment begun in [facility]  Service Provided For[de-identified] Patient  Referral/Consult From[de-identified] Nurse  Encounter Overview/Reason : Initial Encounter    Spiritual beliefs:      [x] Involved in a jodi tradition/spiritual practice: Nilo      [] Supported by a jodi community:      [] Claims no spiritual orientation:      [] Seeking spiritual identity:           [] Adheres to an individual form of spirituality:      [] Not able to assess:                Identified resources for coping and support system:   Support System: Children       [] Prayer                  [] Devotional reading               [] Music                  [] Guided Imagery     [] Pet visits                                        [] Other: (COMMENT)     Specific area/focus of visit   Encounter: Type: Initial Screen/Assessment  Crisis:    Spiritual/Emotional needs:    Ritual, Rites and Sacraments:    Grief, Loss, and Adjustments: Type: Adjustment to illness  Ethics/Mediation:    Behavioral Health:    Palliative Care: Advance Care Planning:      Visited patient at request of her nurse. Listened as she spoke of the issue that triggered her visit to the ER and subsequent admission. She lives alone with her dog. She has one daughter and one granddaughter. Her daughter will stay at her home to care for her dog while she is an inpatient. Listened th her fears and concerns and explored her resources.     Chaplain Rosana, MDiv, MS, [facility]
TTE is unremarkable. Discussed plan with Dr. Candice Sommers yesterday. DAPT and discuss [place] with Cardiologist as outpt given h/o falls. No additional recommendations.
and continue DAPT. Can continue further discussion outpatient. Daughter made aware. 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 Vital Signs:    Last 24hrs VS reviewed since prior progress note. Most recent are:  Vitals:    08/05/23 1400   BP:    Pulse: 82   Resp:    Temp:    SpO2:          Intake/Output Summary (Last 24 hours) at 8/5/2023 1449  Last data filed at 8/4/2023 1745  Gross per 24 hour   Intake 420 ml   Output --   Net 420 ml          Physical Examination:     I had a face to face encounter with this patient and independently examined them on 8/5/2023 as outlined below:          General : alert x 3, awake, no acute distress,   HEENT: PEERL, EOMI, moist mucus membrane, TM clear  Neck: supple, no JVD, no meningeal signs  Chest: Clear to auscultation bilaterally   CVS: S1 S2 heard, Capillary refill less than 2 seconds  Abd: soft/ non tender, non distended, BS physiological,   Ext: no clubbing, no cyanosis, no edema, brisk 2+ DP pulses  Neuro/Psych: pleasant mood and affect, CN 2-12 grossly intact, 5/5    Sensation intact to light touch bilat throughout except tingling to RUE     Skin: warm            Data Review:    Review and/or order of clinical lab test    I have independently reviewed and interpreted patient's lab and all other diagnostic data    Notes reviewed from all clinical/nonclinical/nursing services involved in patient's clinical care.  Care coordination discussions were held with appropriate clinical/nonclinical/ nursing providers
providers based on care coordination needs. Labs:     Recent Labs     08/03/23  1157 08/04/23  0445   WBC 8.9 7.4   HGB 12.1 10.6*   HCT 38.9 35.3    255     Recent Labs     08/03/23  1157   *   K 4.2   CL 96*   CO2 25   BUN 10     Recent Labs     08/03/23  1157   ALT 17   GLOB 3.6     Recent Labs     08/03/23  1157   INR 1.0      No results for input(s): TIBC, FERR in the last 72 hours. Invalid input(s): FE, PSAT   No results found for: FOL, RBCF   No results for input(s): PH, PCO2, PO2 in the last 72 hours. No results for input(s): CPK in the last 72 hours.     Invalid input(s): CPKMB, CKNDX, TROIQ  Lab Results   Component Value Date/Time    CHOL 235 08/04/2023 04:45 AM    HDL 48 08/04/2023 04:45 AM     No results found for: GLUCPOC  [unfilled]      Medications Reviewed:     Current Facility-Administered Medications   Medication Dose Route Frequency    aspirin EC tablet 81 mg  81 mg Oral Daily    biotin (VITAMIN B7) tablet 10 mg  10 mg Oral Daily    clopidogrel (PLAVIX) tablet 75 mg  75 mg Oral Daily    dilTIAZem (CARDIZEM CD) extended release capsule 240 mg  240 mg Oral Daily    doxepin (SINEQUAN) capsule 150 mg  150 mg Oral QHS    gabapentin (NEURONTIN) capsule 300 mg  300 mg Oral TID    OXcarbazepine (TRILEPTAL) tablet 900 mg  900 mg Oral Nightly    glucose chewable tablet 16 g  4 tablet Oral PRN    dextrose bolus 10% 125 mL  125 mL IntraVENous PRN    Or    dextrose bolus 10% 250 mL  250 mL IntraVENous PRN    glucagon (rDNA) injection 1 mg  1 mg SubCUTAneous PRN    dextrose 10 % infusion   IntraVENous Continuous PRN    sodium chloride flush 0.9 % injection 5-40 mL  5-40 mL IntraVENous 2 times per day    sodium chloride flush 0.9 % injection 5-40 mL  5-40 mL IntraVENous PRN    0.9 % sodium chloride infusion   IntraVENous PRN    ondansetron (ZOFRAN-ODT) disintegrating tablet 4 mg  4 mg Oral Q8H PRN    Or    ondansetron (ZOFRAN) injection 4 mg  4 mg IntraVENous Q6H PRN    acetaminophen (TYLENOL)

## 2023-08-06 NOTE — CARE COORDINATION
Transition of Care Plan:    Home with home health PT/OT ([facility])    DME: RW ordered through [employer]    Transport: Daughter    RUR: 11%  Prior Level of Functioning: Independent  Disposition: home health   Follow up appointments: PCP, Neuro  DME needed: RW  Transportation at discharge: Daughter  IM/IMM Medicare/ letter given: 8/3, 8/6  Caregiver Contact: Colette Hanna [phone]  Discharge Caregiver contacted prior to discharge? Care Conference needed? No  Barriers to discharge: None    The program assesses the family and/or caregiver's readiness, willingness, and ability to provide or support and self-management activities for the patient as needed. Therapy recommending IPR, however Pt prefers to discharge home with home health. Pt does not have preference of home health provider; referral sent to [facility]. RW ordered through [employer] via [place]. Physician signature received from Dr. Darryl Kebede.  plan to deliver RW to Pt's bedside. [facility] Follow up info placed on AVS.    1300: RW delivered to bedside. 08/06/23 1153   Condition of Participation: Discharge Planning   The Plan for Transition of Care is related to the following treatment goals: home healtlh   The Patient and/or Patient Representative was provided with a Choice of Provider? Patient   The Patient and/Or Patient Representative agree with the Discharge Plan? Yes   Freedom of Choice list was provided with basic dialogue that supports the patient's individualized plan of care/goals, treatment preferences, and shares the quality data associated with the providers? Yes     [facility]) VERNA Parekh

## 2023-08-06 NOTE — PLAN OF CARE
Problem: Discharge Planning  Goal: Discharge to home or other facility with appropriate resources  8/6/2023 0312 by Ashlee Levine RN  Outcome: Progressing  Flowsheets (Taken 8/5/2023 2040)  Discharge to home or other facility with appropriate resources:   Identify barriers to discharge with patient and caregiver   Arrange for interpreters to assist at discharge as needed  8/5/2023 1552 by Juan Fernando RN  Outcome: Progressing  Flowsheets (Taken 8/5/2023 0800)  Discharge to home or other facility with appropriate resources: Identify barriers to discharge with patient and caregiver     Problem: Safety - Adult  Goal: Free from fall injury  8/6/2023 0312 by Ashlee Levine RN  Outcome: Progressing  8/5/2023 1552 by Juan Fernando RN  Outcome: Progressing     Problem: Chronic Conditions and Co-morbidities  Goal: Patient's chronic conditions and co-morbidity symptoms are monitored and maintained or improved  8/6/2023 0312 by Ashlee Levine RN  Outcome: Progressing  Flowsheets (Taken 8/5/2023 2040)  Care Plan - Patient's Chronic Conditions and Co-Morbidity Symptoms are Monitored and Maintained or Improved: Monitor and assess patient's chronic conditions and comorbid symptoms for stability, deterioration, or improvement  8/5/2023 1552 by Juan Fernando RN  Outcome: Progressing  Flowsheets (Taken 8/5/2023 0800)  Care Plan - Patient's Chronic Conditions and Co-Morbidity Symptoms are Monitored and Maintained or Improved: Monitor and assess patient's chronic conditions and comorbid symptoms for stability, deterioration, or improvement
Problem: Discharge Planning  Goal: Discharge to home or other facility with appropriate resources  8/6/2023 1149 by Chayo Kwok RN  Outcome: Progressing  Flowsheets (Taken 8/6/2023 0800)  Discharge to home or other facility with appropriate resources: Identify barriers to discharge with patient and caregiver  8/6/2023 0312 by Rebecca Gold RN  Outcome: Progressing  Flowsheets (Taken 8/5/2023 2040)  Discharge to home or other facility with appropriate resources:   Identify barriers to discharge with patient and caregiver   Arrange for interpreters to assist at discharge as needed     Problem: Safety - Adult  Goal: Free from fall injury  8/6/2023 1149 by Chayo Kwok RN  Outcome: Progressing  8/6/2023 0312 by Rebecca Gold RN  Outcome: Progressing     Problem: Chronic Conditions and Co-morbidities  Goal: Patient's chronic conditions and co-morbidity symptoms are monitored and maintained or improved  8/6/2023 1149 by Chayo Kwok RN  Outcome: Progressing  Flowsheets (Taken 8/6/2023 0800)  Care Plan - Patient's Chronic Conditions and Co-Morbidity Symptoms are Monitored and Maintained or Improved: Monitor and assess patient's chronic conditions and comorbid symptoms for stability, deterioration, or improvement  8/6/2023 0312 by Rebecca Gold RN  Outcome: Progressing  Flowsheets (Taken 8/5/2023 2040)  Care Plan - Patient's Chronic Conditions and Co-Morbidity Symptoms are Monitored and Maintained or Improved: Monitor and assess patient's chronic conditions and comorbid symptoms for stability, deterioration, or improvement
Problem: Discharge Planning  Goal: Discharge to home or other facility with appropriate resources  Outcome: Progressing  Flowsheets (Taken 8/5/2023 0800)  Discharge to home or other facility with appropriate resources: Identify barriers to discharge with patient and caregiver     Problem: Safety - Adult  Goal: Free from fall injury  Outcome: Progressing     Problem: Chronic Conditions and Co-morbidities  Goal: Patient's chronic conditions and co-morbidity symptoms are monitored and maintained or improved  Outcome: Progressing  Flowsheets (Taken 8/5/2023 0800)  Care Plan - Patient's Chronic Conditions and Co-Morbidity Symptoms are Monitored and Maintained or Improved: Monitor and assess patient's chronic conditions and comorbid symptoms for stability, deterioration, or improvement
Problem: Occupational Therapy - Adult  Goal: By Discharge: Performs self-care activities at highest level of function for planned discharge setting. See evaluation for individualized goals. Description: FUNCTIONAL STATUS PRIOR TO ADMISSION:        , ADL Assistance: Independent,  ,  ,  ,  ,  , Homemaking Assistance: Independent, Ambulation Assistance: Independent, Transfer Assistance: Independent, Active : Yes            HOME SUPPORT: Patient lived alone with no local support. Had nearby daughter but did not rely on her for any kind of assist. Pt reporting at least 4-5 falls in the last year with broken toes (3-5) from most recent fall 4 days ago. Occupational Therapy Goals  Initiated 8/4/2023   1. Patient will perform upper body dressing and lower body dressing with Modified Drury within 7 day(s). 2.  Patient will perform grooming routine standing at sink with out LOB with  Stand by Assist within 7 day(s). 3.  Patient will perform toileting with Supervision within 7 day(s). 4.  Patient will perform toilet transfers with Supervision within 7 day(s). 5.  Patient will perform all aspects of toileting with Supervision within 7 day(s). 6.  Patient will participate in upper extremity therapeutic exercise/activities with Supervision within 7 day(s). 7.  Patient will utilize energy conservation techniques during functional activities with verbal cues within 7 day(s). 8.  Patient will improve their Fugl Salamanca score by 5 points in prep for ADLs within 7 days. 8/4/2023 1418 by Casandra Cuenca OT  Outcome: Progressing     Problem: Physical Therapy - Adult  Goal: By Discharge: Performs mobility at highest level of function for planned discharge setting. See evaluation for individualized goals. Description: FUNCTIONAL STATUS PRIOR TO ADMISSION: Patient was independent and active without use of DME. Patient reports frequent falls, >5 within the last year in her home.  Patient's most recent fall
(occasional)  Standing: Impaired  Standing - Static: Good;Fair;Unsupported  Standing - Dynamic: Fair;Poor; Unsupported  Ambulation/Gait Training:                       Gait  Overall Level of Assistance: Minimum assistance; Moderate assistance;Assist X1 (overall min A x 1; mod A x 1 required for 2 major R lateral LOB to prevent fall)  Interventions: Safety awareness training;Verbal cues; Tactile cues; Visual cues  Base of Support: Center of gravity altered;Narrowed  Speed/Kenzie: Slow;Shuffled  Step Length: Left shortened  Swing Pattern: Right asymmetrical  Stance: Right decreased; Left increased  Gait Abnormalities: Altered arm swing;Decreased step clearance; Path deviations; Shuffling gait;Trunk sway increased  Distance (ft): 60 Feet  Assistive Device: Gait belt (intermittent HHA x1)                                                                                                                                                                                                                                                Intervention/Education specific to: \"Stroke diagnoses\"    Patient was educated regarding her deficit(s) of R weakness, impaired sensation, impaired balance, high risk for falls as this relates to her diagnosis of acute CVA. She demonstrated good  understanding as evidenced by verbal endorsement of understanding. Patient and/or family was verbally educated on the BE FAST acronym for signs/symptoms of CVA and TIA. BE FAST was written on patient's communication board for visual education and reinforcement. All questions answered with patient indicating good understanding. Rubio Balance Test:    Redgie Neas Balance Scale  1. Sitting to Standing: Able to stand independently using hands  2. Standing Unsupported: Able to stand 30 seconds unsupported  3. Sitting with Back Unsupported but Feet Supported on Floor or on a Stool: Able to sit safely and securely for 2 minutes  4.  Standing to Sitting: Controls descent by
Flexion (0-90 degrees): Full  Pronation-Supination: Full  Subtotal: 6    Volitional Movement With Little or No Synergy  Shoulder Abduction (0-90 degrees): Full  Shoulder Flexion ( degrees): Full  Pronation/Supination: Full  Subtotal: 6    Normal Reflex Activity  Biceps, Triceps, Finger Flexors: Full  Subtotal: 2    Upper Extremity Total   Upper Extremity Total: 33    Wrist  Stability at 15 Degree Dorsiflexion: Partial  Repeated Dorsiflexion/ Volar Flexion: Partial  Stability at 15 Degree Dorsiflexion: Partial  Repeated Dorsiflexion/ Volar Flexion: Partial  Circumduction: Full  Wrist Total: 6    Hand  Mass Flexion: Full  Mass Extension: Full  Grasp A: Partial  Grasp B: Partial  Grasp C: Partial  Grasp D: Partial  Grasp E: Partial  Hand Total: 9    Coordination/Speed  Tremor: None  Dysmetria: Slight  Time: 2-5s  Coordination/Speed Total: 4    Total A-D  Total A-D (Motor Function): 52         This is a reliable/valid measure of arm function after a neurological event. It has established value to characterize functional status and for measuring spontaneous and therapy-induced recovery; tests proximal and distal motor functions. Fugl-Salamanca Assessment - UE scores recorded between five and 30 days post neurologic event can be used to predict UE recovery at six months post neurologic event. Severe = 0-21 points   Moderately Severe = 22-33 points   Moderate = 34-47 points   Mild = 48-66 points  MAYA Ellington, ANDREW Han, & ROSELYN Matthews (1992). Measurement of motor recovery after stroke: Outcome assessment and sample size requirements.  Stroke, 23, pp. 9115-6661.   --------------------------------------------------------------------------------------------------------------------------------------------------------------------  MCID:  Stroke:   Jessika Bland et al, 2001; n = 171; mean age 79 (6) years; assessed within 16 (12) days of stroke, Acute Stroke)  FMA Motor Scores from Admission to

## 2023-08-30 ENCOUNTER — OFFICE VISIT (OUTPATIENT)
Age: 78
End: 2023-08-30
Payer: MEDICARE

## 2023-08-30 VITALS
HEIGHT: 60 IN | OXYGEN SATURATION: 99 % | SYSTOLIC BLOOD PRESSURE: 154 MMHG | TEMPERATURE: 97.1 F | BODY MASS INDEX: 29.41 KG/M2 | DIASTOLIC BLOOD PRESSURE: 86 MMHG | HEART RATE: 59 BPM | WEIGHT: 149.8 LBS

## 2023-08-30 DIAGNOSIS — I67.1 CEREBRAL ANEURYSM: Primary | ICD-10-CM

## 2023-08-30 PROCEDURE — 1036F TOBACCO NON-USER: CPT | Performed by: RADIOLOGY

## 2023-08-30 PROCEDURE — 1090F PRES/ABSN URINE INCON ASSESS: CPT | Performed by: RADIOLOGY

## 2023-08-30 PROCEDURE — 99204 OFFICE O/P NEW MOD 45 MIN: CPT | Performed by: RADIOLOGY

## 2023-08-30 PROCEDURE — 1123F ACP DISCUSS/DSCN MKR DOCD: CPT | Performed by: RADIOLOGY

## 2023-08-30 PROCEDURE — G8400 PT W/DXA NO RESULTS DOC: HCPCS | Performed by: RADIOLOGY

## 2023-08-30 PROCEDURE — G8427 DOCREV CUR MEDS BY ELIG CLIN: HCPCS | Performed by: RADIOLOGY

## 2023-08-30 PROCEDURE — 1111F DSCHRG MED/CURRENT MED MERGE: CPT | Performed by: RADIOLOGY

## 2023-08-30 PROCEDURE — G8419 CALC BMI OUT NRM PARAM NOF/U: HCPCS | Performed by: RADIOLOGY

## 2023-08-30 RX ORDER — BIOTIN 2500 MCG
2.5 CAPSULE ORAL DAILY
COMMUNITY

## 2023-08-30 RX ORDER — CALCIUM CARBONATE 300MG(750)
400 TABLET,CHEWABLE ORAL DAILY
COMMUNITY

## 2023-08-30 RX ORDER — VIT C/B6/B5/MAGNESIUM/HERB 173 50-5-6-5MG
500 CAPSULE ORAL DAILY
COMMUNITY
End: 2023-11-27

## 2023-08-30 RX ORDER — PANTOPRAZOLE SODIUM 40 MG/1
40 TABLET, DELAYED RELEASE ORAL 2 TIMES DAILY
COMMUNITY
Start: 2023-07-19

## 2023-10-04 ENCOUNTER — TELEPHONE (OUTPATIENT)
Age: 78
End: 2023-10-04

## 2023-10-04 NOTE — TELEPHONE ENCOUNTER
Patient called stating that she needs a refill on her losartan. Patient states that Dr. Reinier Arce said that he would refill this prescription for her. Pharmacy in [address] [place] [address] is correct.

## 2023-10-06 NOTE — TELEPHONE ENCOUNTER
Spoke to patient regarding refill for Losartan. Informed patient she is to have her PCP refill and monitor her blood pressure and cholesterol medications per her hospital discharge paperwork. Informed patient we will monitor Plavix and Aspirin . Patient stated understanding and stated she has a call in to her PCP for refills.

## 2023-11-27 ENCOUNTER — OFFICE VISIT (OUTPATIENT)
Age: 78
End: 2023-11-27
Payer: MEDICARE

## 2023-11-27 VITALS
SYSTOLIC BLOOD PRESSURE: 131 MMHG | HEART RATE: 58 BPM | OXYGEN SATURATION: 97 % | WEIGHT: 149 LBS | HEIGHT: 60 IN | RESPIRATION RATE: 17 BRPM | BODY MASS INDEX: 29.25 KG/M2 | DIASTOLIC BLOOD PRESSURE: 72 MMHG

## 2023-11-27 DIAGNOSIS — Z09 HOSPITAL DISCHARGE FOLLOW-UP: ICD-10-CM

## 2023-11-27 DIAGNOSIS — I65.09 VERTEBRAL ARTERY STENOSIS, UNSPECIFIED LATERALITY: ICD-10-CM

## 2023-11-27 DIAGNOSIS — I63.81 CEREBROVASCULAR ACCIDENT (CVA) OF LEFT THALAMUS (HCC): Primary | ICD-10-CM

## 2023-11-27 DIAGNOSIS — I67.1 ANEURYSM OF ANTERIOR COMMUNICATING ARTERY: ICD-10-CM

## 2023-11-27 DIAGNOSIS — R55 SYNCOPE, UNSPECIFIED SYNCOPE TYPE: ICD-10-CM

## 2023-11-27 DIAGNOSIS — I65.22: ICD-10-CM

## 2023-11-27 PROCEDURE — G8427 DOCREV CUR MEDS BY ELIG CLIN: HCPCS

## 2023-11-27 PROCEDURE — 1123F ACP DISCUSS/DSCN MKR DOCD: CPT

## 2023-11-27 PROCEDURE — 1036F TOBACCO NON-USER: CPT

## 2023-11-27 PROCEDURE — G8400 PT W/DXA NO RESULTS DOC: HCPCS

## 2023-11-27 PROCEDURE — 99215 OFFICE O/P EST HI 40 MIN: CPT

## 2023-11-27 PROCEDURE — 1090F PRES/ABSN URINE INCON ASSESS: CPT

## 2023-11-27 PROCEDURE — G8419 CALC BMI OUT NRM PARAM NOF/U: HCPCS

## 2023-11-27 PROCEDURE — G8484 FLU IMMUNIZE NO ADMIN: HCPCS

## 2023-11-27 RX ORDER — CLOPIDOGREL BISULFATE 75 MG/1
75 TABLET ORAL DAILY
Qty: 90 TABLET | Refills: 1 | Status: SHIPPED | OUTPATIENT
Start: 2023-11-27

## 2023-11-27 ASSESSMENT — PATIENT HEALTH QUESTIONNAIRE - PHQ9
SUM OF ALL RESPONSES TO PHQ9 QUESTIONS 1 & 2: 0
SUM OF ALL RESPONSES TO PHQ QUESTIONS 1-9: 0
1. LITTLE INTEREST OR PLEASURE IN DOING THINGS: 0
2. FEELING DOWN, DEPRESSED OR HOPELESS: 0
SUM OF ALL RESPONSES TO PHQ QUESTIONS 1-9: 0

## 2023-11-27 NOTE — PROGRESS NOTES
Chief Complaint   Patient presents with    Follow-Up from Hospital    Cerebrovascular Accident     Patient reports balance issues.   Mainly using wheelchair at home      Vitals:    11/27/23 1337   BP: 131/72   Pulse: 58   Resp: 17   SpO2: 97%

## 2023-11-27 NOTE — PROGRESS NOTES
Chief Complaint   Patient presents with    Follow-Up from Hospital    Cerebrovascular Accident     Patient reports balance issues. Mainly using wheelchair at home       HPI    Mrs Princess Esparza is a 78 year female here for her hospital follow up. She was seen inpatient by JASON MURRAY [facility] NP on 8/3/23 for right sided weakness that started 2 days prior. PMHX of bipolar, HTN, DM2, A-fib not on [address] MRI brain showed a small acute infarct in the left thalamus with moderate stenosis of the left common carotid and moderate-severe stenosis of the vertebral artery and a CAROLYN aneurysm. She was started on DAPT. STEVEN was negative but needs to FU with cardiology regarding [address] She saw [facility] for the aneurysm. She is here today with her daughter. She is doing well since discharge. She is still having some slight right sided weakness. Completed PT. She sees [facility] cardiology right now but is reporting lots of syncope at home. Since discharge its been worse. She is on Cardizem for her afib. She can feel her HR increase and then she gets very diaphoretic and passes out. She may vomit. Typically exertion or exciting causes her to feel this. She has worn a monitor in the past. She was told that her DBP can drop into the 40's. She does remember the event when she comes too. No loss of bowl or bladder. No other stroke like s/s since last. Tolerating her DAPT without side effects. She uses a wheelchair at home in case she falls. Review of Systems   Musculoskeletal:  Negative for gait problem. Neurological:  Positive for dizziness, syncope, facial asymmetry, weakness and light-headedness. Negative for speech difficulty. Psychiatric/Behavioral:  Negative for confusion and sleep disturbance.           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

## 2024-01-03 ENCOUNTER — OFFICE VISIT (OUTPATIENT)
Age: 79
End: 2024-01-03
Payer: MEDICARE

## 2024-01-03 VITALS
HEART RATE: 80 BPM | SYSTOLIC BLOOD PRESSURE: 170 MMHG | HEIGHT: 60 IN | DIASTOLIC BLOOD PRESSURE: 70 MMHG | OXYGEN SATURATION: 99 % | RESPIRATION RATE: 18 BRPM | BODY MASS INDEX: 29.06 KG/M2 | WEIGHT: 148 LBS

## 2024-01-03 DIAGNOSIS — R55 SYNCOPE AND COLLAPSE: ICD-10-CM

## 2024-01-03 DIAGNOSIS — I10 ESSENTIAL (PRIMARY) HYPERTENSION: ICD-10-CM

## 2024-01-03 DIAGNOSIS — I95.0 IDIOPATHIC HYPOTENSION: ICD-10-CM

## 2024-01-03 DIAGNOSIS — R93.1 ABNORMAL ECHOCARDIOGRAM: ICD-10-CM

## 2024-01-03 DIAGNOSIS — Z78.9 STATIN INTOLERANCE: ICD-10-CM

## 2024-01-03 DIAGNOSIS — I65.23 BILATERAL CAROTID ARTERY STENOSIS: ICD-10-CM

## 2024-01-03 DIAGNOSIS — R07.89 CHEST DISCOMFORT: Primary | ICD-10-CM

## 2024-01-03 DIAGNOSIS — R00.2 PALPITATIONS: ICD-10-CM

## 2024-01-03 DIAGNOSIS — E78.00 PURE HYPERCHOLESTEROLEMIA: ICD-10-CM

## 2024-01-03 DIAGNOSIS — I63.81 THALAMIC STROKE (HCC): ICD-10-CM

## 2024-01-03 DIAGNOSIS — R07.9 CHEST PAIN, UNSPECIFIED TYPE: ICD-10-CM

## 2024-01-03 PROCEDURE — 1036F TOBACCO NON-USER: CPT | Performed by: SPECIALIST

## 2024-01-03 PROCEDURE — 3078F DIAST BP <80 MM HG: CPT | Performed by: SPECIALIST

## 2024-01-03 PROCEDURE — G8427 DOCREV CUR MEDS BY ELIG CLIN: HCPCS | Performed by: SPECIALIST

## 2024-01-03 PROCEDURE — 99204 OFFICE O/P NEW MOD 45 MIN: CPT | Performed by: SPECIALIST

## 2024-01-03 PROCEDURE — G8484 FLU IMMUNIZE NO ADMIN: HCPCS | Performed by: SPECIALIST

## 2024-01-03 PROCEDURE — 93000 ELECTROCARDIOGRAM COMPLETE: CPT | Performed by: SPECIALIST

## 2024-01-03 PROCEDURE — 1090F PRES/ABSN URINE INCON ASSESS: CPT | Performed by: SPECIALIST

## 2024-01-03 PROCEDURE — 1123F ACP DISCUSS/DSCN MKR DOCD: CPT | Performed by: SPECIALIST

## 2024-01-03 PROCEDURE — 3077F SYST BP >= 140 MM HG: CPT | Performed by: SPECIALIST

## 2024-01-03 PROCEDURE — G8400 PT W/DXA NO RESULTS DOC: HCPCS | Performed by: SPECIALIST

## 2024-01-03 PROCEDURE — G8419 CALC BMI OUT NRM PARAM NOF/U: HCPCS | Performed by: SPECIALIST

## 2024-01-03 RX ORDER — FERROUS GLUCONATE 324(38)MG
TABLET ORAL
COMMUNITY
Start: 2020-10-05

## 2024-01-03 RX ORDER — KETOCONAZOLE 20 MG/ML
SHAMPOO TOPICAL
COMMUNITY
Start: 2023-12-20

## 2024-01-03 RX ORDER — FLUOCINOLONE ACETONIDE 0.1 MG/ML
SOLUTION TOPICAL
COMMUNITY
Start: 2023-12-22

## 2024-01-03 RX ORDER — GLIPIZIDE 5 MG/1
TABLET, FILM COATED, EXTENDED RELEASE ORAL
COMMUNITY

## 2024-01-03 RX ORDER — SEMAGLUTIDE 0.68 MG/ML
INJECTION, SOLUTION SUBCUTANEOUS
COMMUNITY
Start: 2023-10-03

## 2024-01-03 ASSESSMENT — PATIENT HEALTH QUESTIONNAIRE - PHQ9
SUM OF ALL RESPONSES TO PHQ QUESTIONS 1-9: 0
SUM OF ALL RESPONSES TO PHQ9 QUESTIONS 1 & 2: 0
SUM OF ALL RESPONSES TO PHQ QUESTIONS 1-9: 0
SUM OF ALL RESPONSES TO PHQ QUESTIONS 1-9: 0
2. FEELING DOWN, DEPRESSED OR HOPELESS: 0
SUM OF ALL RESPONSES TO PHQ QUESTIONS 1-9: 0
1. LITTLE INTEREST OR PLEASURE IN DOING THINGS: 0

## 2024-01-03 NOTE — PROGRESS NOTES
Dr. Khalil signed DNR form for patient. We will have it scanned into her chart.  
Hypertension     Neuropathy     Other ill-defined conditions(799.89)     celiac disease    Psychiatric disorder     depression/anxiety    Pure hypercholesterolemia 4/12/2011    Vertigo     Visual disturbance      Past Surgical History:   Procedure Laterality Date    APPENDECTOMY      GYN      tubal ligation    GYN      cyst removed from ovary at same time as appendectomy    HEENT      skin cancer removed from ear    HEENT      bilateral cataracts removed    HEENT      sinus surgery    ORTHOPEDIC SURGERY      back    OTHER SURGICAL HISTORY      colonoscopy x 1 - hx colon polyps    [facility] UNLISTED PROCEDURE CARDIAC SURGERY      cardiac cath - normal    ANGELITO AND BSO (CERVIX REMOVED)       Family History   Problem Relation Age of Onset    Breast Cancer Sister     Colon Polyps Other         2 cousins    Cancer Other         Aunt, uncle    Stroke Other         aunt     Social History     Tobacco Use    Smoking status: Former     Current packs/day: 0.50     Types: Cigarettes     Passive exposure: Never    Smokeless tobacco: Never    Tobacco comments:     Quit smoking: current cigarette smoker   Substance Use Topics    Alcohol use: Not Currently       Review of Systems   Constitutional: Positive for diaphoresis.   Cardiovascular:  Positive for chest pain and syncope.   Musculoskeletal:  Positive for falls.   Neurological:  Positive for dizziness and loss of balance.   All other systems reviewed and are negative.       BP (!) 170/70 (Site: Right Upper Arm, Position: Sitting, Cuff Size: Medium Adult)   Pulse 80   Resp 18   Ht 1.524 m (5')   Wt 67.1 kg (148 lb)   SpO2 99%   BMI 28.90 kg/m²    Physical Exam  Vitals and nursing note reviewed.   Constitutional:       Appearance: Normal appearance.   HENT:      Head: Normocephalic.      Right Ear: External ear normal.      Left Ear: External ear normal.      Nose: Nose normal.      Mouth/Throat:      Mouth: Mucous membranes are moist.   Eyes:      Extraocular Movements:

## 2024-01-03 NOTE — PATIENT INSTRUCTIONS
Bushra stress test instructions:  No caffeine products 24 hrs prior (ex: coffee, tea, soda, chocolate, migraine medications, etc.). Even \"decaf\" beverages  Nothing to eat or drink 4 hrs prior except medications with sips of water   Medication instructions: be sure to eat if you take diabetes meds  No tobacco/nicotine products 12 hours prior.   Please call 24 hrs prior if appointment needs to be rescheduled.

## 2024-01-09 ENCOUNTER — TELEPHONE (OUTPATIENT)
Age: 79
End: 2024-01-09

## 2024-01-09 NOTE — TELEPHONE ENCOUNTER
Spoke to patient to remind her that her 6 month imaging and follow-up are coming due in February 2024. Gave her the number to central scheduling and [facility].

## 2024-01-15 ENCOUNTER — ANCILLARY PROCEDURE (OUTPATIENT)
Age: 79
End: 2024-01-15
Payer: MEDICARE

## 2024-01-15 ENCOUNTER — TELEPHONE (OUTPATIENT)
Age: 79
End: 2024-01-15

## 2024-01-15 VITALS
DIASTOLIC BLOOD PRESSURE: 60 MMHG | WEIGHT: 148 LBS | SYSTOLIC BLOOD PRESSURE: 160 MMHG | HEIGHT: 60 IN | HEART RATE: 61 BPM | BODY MASS INDEX: 29.06 KG/M2

## 2024-01-15 DIAGNOSIS — R07.9 CHEST PAIN, UNSPECIFIED TYPE: ICD-10-CM

## 2024-01-15 DIAGNOSIS — R93.1 ABNORMAL ECHOCARDIOGRAM: ICD-10-CM

## 2024-01-15 DIAGNOSIS — R07.89 CHEST DISCOMFORT: ICD-10-CM

## 2024-01-15 PROCEDURE — 78452 HT MUSCLE IMAGE SPECT MULT: CPT | Performed by: INTERNAL MEDICINE

## 2024-01-15 PROCEDURE — A9500 TC99M SESTAMIBI: HCPCS | Performed by: INTERNAL MEDICINE

## 2024-01-15 RX ORDER — TETRAKIS(2-METHOXYISOBUTYLISOCYANIDE)COPPER(I) TETRAFLUOROBORATE 1 MG/ML
7.8 INJECTION, POWDER, LYOPHILIZED, FOR SOLUTION INTRAVENOUS
Status: COMPLETED | OUTPATIENT
Start: 2024-01-15 | End: 2024-01-15

## 2024-01-15 RX ORDER — TETRAKIS(2-METHOXYISOBUTYLISOCYANIDE)COPPER(I) TETRAFLUOROBORATE 1 MG/ML
24.7 INJECTION, POWDER, LYOPHILIZED, FOR SOLUTION INTRAVENOUS
Status: COMPLETED | OUTPATIENT
Start: 2024-01-15 | End: 2024-01-15

## 2024-01-15 RX ORDER — REGADENOSON 0.08 MG/ML
0.4 INJECTION, SOLUTION INTRAVENOUS
Status: COMPLETED | OUTPATIENT
Start: 2024-01-15 | End: 2024-01-15

## 2024-01-15 RX ADMIN — TECHNETIUM TC-99M SESTAMIBI 24.7 MILLICURIE: 1 INJECTION INTRAVENOUS at 13:55

## 2024-01-15 RX ADMIN — TECHNETIUM TC-99M SESTAMIBI 7.8 MILLICURIE: 1 INJECTION INTRAVENOUS at 12:40

## 2024-01-15 RX ADMIN — REGADENOSON 0.4 MG: 0.08 INJECTION, SOLUTION INTRAVENOUS at 13:55

## 2024-01-15 NOTE — TELEPHONE ENCOUNTER
Left voicemail for patient to call us back so that we can assist her in scheduling her imaging follow-up after her imaging scheduled for 2/5/24.  Left number for  [facility].

## 2024-01-16 ENCOUNTER — TELEPHONE (OUTPATIENT)
Age: 79
End: 2024-01-16

## 2024-01-16 DIAGNOSIS — R00.2 PALPITATIONS: Primary | ICD-10-CM

## 2024-01-16 DIAGNOSIS — R55 SYNCOPE AND COLLAPSE: ICD-10-CM

## 2024-01-16 LAB
ECHO BSA: 1.69 M2
NUC STRESS EJECTION FRACTION: 72 %
STRESS BASELINE DIAS BP: 60 MMHG
STRESS BASELINE HR: 71 BPM
STRESS BASELINE ST DEPRESSION: 0 MM
STRESS BASELINE SYS BP: 170 MMHG
STRESS ESTIMATED WORKLOAD: 1 METS
STRESS O2 SAT PEAK: 100 %
STRESS O2 SAT REST: 98 %
STRESS PEAK DIAS BP: 50 MMHG
STRESS PEAK SYS BP: 120 MMHG
STRESS PERCENT HR ACHIEVED: 54 %
STRESS POST PEAK HR: 75 BPM
STRESS RATE PRESSURE PRODUCT: 9000 BPM*MMHG
STRESS ST DEPRESSION: 0 MM
STRESS TARGET HR: 140 BPM

## 2024-01-16 NOTE — TELEPHONE ENCOUNTER
----- Message from Nestor Torres MD sent at 1/16/2024  1:46 PM EST -----  Stress test on this pt of yours was normal. thx

## 2024-01-16 NOTE — TELEPHONE ENCOUNTER
Called pt. Verified patient's identity with two identifiers. Notified pt of stress test results. Patient verbalized understanding and denied further questions or concerns.

## 2024-01-17 ENCOUNTER — TELEPHONE (OUTPATIENT)
Age: 79
End: 2024-01-17

## 2024-01-17 NOTE — TELEPHONE ENCOUNTER
Lazaro Khalil MD Cook, Jennifer L RN  Caller: Unspecified (Yesterday,  4:44 PM)  She needs a loop recorder mailed

## 2024-01-17 NOTE — TELEPHONE ENCOUNTER
Called pt. Verified patient's identity with two identifiers. Notified pt Dr. Khalil advised we mail her a 30 day event monitor. Explained monitor. Pt said she has worn one before and agrees to wear one again. Pt still c/o nausea and vomiting, stated it has been going on for years, but no one has figured out what it is. She mentioned she sees her neurologist in a couple weeks about carotid blockage. Discussed signs and symptoms qualifying urgent care or call to office. Patient verbalized understanding and denied further questions or concerns.

## 2024-01-17 NOTE — TELEPHONE ENCOUNTER
Enrolled with [employer] - Ordered and being shipped to patient's home address on file.  ETA within 5-7 business days.         safemail  Received: Today  Randa Jones, Erma [address] would like to have a 30 day event monitor mailed to patient. Dx palpitations, syncope, r/o afib. Please request sensitive skin strips to be mailed initially.    Thanks!  Pebbles

## 2024-02-05 ENCOUNTER — APPOINTMENT (OUTPATIENT)
Facility: HOSPITAL | Age: 79
DRG: 056 | End: 2024-02-05
Payer: MEDICARE

## 2024-02-05 ENCOUNTER — HOSPITAL ENCOUNTER (OUTPATIENT)
Facility: HOSPITAL | Age: 79
Discharge: HOME | End: 2024-02-08
Attending: RADIOLOGY
Payer: MEDICARE

## 2024-02-05 ENCOUNTER — HOSPITAL ENCOUNTER (INPATIENT)
Facility: HOSPITAL | Age: 79
LOS: 4 days | Discharge: HOME HEALTH | DRG: 056 | End: 2024-02-09
Attending: EMERGENCY MEDICINE | Admitting: FAMILY MEDICINE
Payer: MEDICARE

## 2024-02-05 DIAGNOSIS — I67.1 CEREBRAL ANEURYSM: ICD-10-CM

## 2024-02-05 DIAGNOSIS — R41.82 ALTERED MENTAL STATUS, UNSPECIFIED ALTERED MENTAL STATUS TYPE: Primary | ICD-10-CM

## 2024-02-05 LAB
ALBUMIN SERPL-MCNC: 3.4 G/DL (ref 3.5–5)
ALBUMIN/GLOB SERPL: 1.3 (ref 1.1–2.2)
ALP SERPL-CCNC: 133 U/L (ref 45–117)
ALT SERPL-CCNC: 13 U/L (ref 12–78)
ANION GAP SERPL CALC-SCNC: 5 MMOL/L (ref 5–15)
AST SERPL-CCNC: 9 U/L (ref 15–37)
BILIRUB SERPL-MCNC: 0.3 MG/DL (ref 0.2–1)
BUN SERPL-MCNC: 10 MG/DL (ref 6–20)
BUN/CREAT SERPL: 16 (ref 12–20)
CALCIUM SERPL-MCNC: 8.8 MG/DL (ref 8.5–10.1)
CHLORIDE SERPL-SCNC: 100 MMOL/L (ref 97–108)
CO2 SERPL-SCNC: 26 MMOL/L (ref 21–32)
COMMENT:: NORMAL
CREAT SERPL-MCNC: 0.64 MG/DL (ref 0.55–1.02)
ERYTHROCYTE [DISTWIDTH] IN BLOOD BY AUTOMATED COUNT: 16 % (ref 11.5–14.5)
EST. AVERAGE GLUCOSE BLD GHB EST-MCNC: 131 MG/DL
GLOBULIN SER CALC-MCNC: 2.6 G/DL (ref 2–4)
GLUCOSE BLD STRIP.AUTO-MCNC: 119 MG/DL (ref 65–117)
GLUCOSE BLD STRIP.AUTO-MCNC: 135 MG/DL (ref 65–117)
GLUCOSE SERPL-MCNC: 130 MG/DL (ref 65–100)
HBA1C MFR BLD: 6.2 % (ref 4–5.6)
HCT VFR BLD AUTO: 24.3 % (ref 35–47)
HCT VFR BLD AUTO: 24.8 % (ref 35–47)
HEMOCCULT STL QL: NEGATIVE
HGB BLD-MCNC: 7.1 G/DL (ref 11.5–16)
HGB BLD-MCNC: 7.2 G/DL (ref 11.5–16)
MCH RBC QN AUTO: 22.5 PG (ref 26–34)
MCHC RBC AUTO-ENTMCNC: 29.6 G/DL (ref 30–36.5)
MCV RBC AUTO: 75.9 FL (ref 80–99)
NRBC # BLD: 0 K/UL (ref 0–0.01)
NRBC BLD-RTO: 0 PER 100 WBC
PLATELET # BLD AUTO: 301 K/UL (ref 150–400)
PMV BLD AUTO: 10.5 FL (ref 8.9–12.9)
POTASSIUM SERPL-SCNC: 4.6 MMOL/L (ref 3.5–5.1)
PROT SERPL-MCNC: 6 G/DL (ref 6.4–8.2)
RBC # BLD AUTO: 3.2 M/UL (ref 3.8–5.2)
SERVICE CMNT-IMP: ABNORMAL
SERVICE CMNT-IMP: ABNORMAL
SODIUM SERPL-SCNC: 131 MMOL/L (ref 136–145)
SPECIMEN HOLD: NORMAL
WBC # BLD AUTO: 7.2 K/UL (ref 3.6–11)

## 2024-02-05 PROCEDURE — 70544 MR ANGIOGRAPHY HEAD W/O DYE: CPT

## 2024-02-05 PROCEDURE — 2060000000 HC ICU INTERMEDIATE R&B

## 2024-02-05 PROCEDURE — 6370000000 HC RX 637 (ALT 250 FOR IP): Performed by: FAMILY MEDICINE

## 2024-02-05 PROCEDURE — 95816 EEG AWAKE AND DROWSY: CPT

## 2024-02-05 PROCEDURE — 99223 1ST HOSP IP/OBS HIGH 75: CPT | Performed by: NURSE PRACTITIONER

## 2024-02-05 PROCEDURE — 71045 X-RAY EXAM CHEST 1 VIEW: CPT

## 2024-02-05 PROCEDURE — 95816 EEG AWAKE AND DROWSY: CPT | Performed by: PSYCHIATRY & NEUROLOGY

## 2024-02-05 PROCEDURE — 70450 CT HEAD/BRAIN W/O DYE: CPT

## 2024-02-05 PROCEDURE — 2580000003 HC RX 258: Performed by: RADIOLOGY

## 2024-02-05 PROCEDURE — 6370000000 HC RX 637 (ALT 250 FOR IP): Performed by: EMERGENCY MEDICINE

## 2024-02-05 PROCEDURE — 6360000004 HC RX CONTRAST MEDICATION: Performed by: RADIOLOGY

## 2024-02-05 PROCEDURE — 85014 HEMATOCRIT: CPT

## 2024-02-05 PROCEDURE — 85018 HEMOGLOBIN: CPT

## 2024-02-05 PROCEDURE — 36415 COLL VENOUS BLD VENIPUNCTURE: CPT

## 2024-02-05 PROCEDURE — 99285 EMERGENCY DEPT VISIT HI MDM: CPT

## 2024-02-05 PROCEDURE — A9579 GAD-BASE MR CONTRAST NOS,1ML: HCPCS | Performed by: RADIOLOGY

## 2024-02-05 PROCEDURE — 80053 COMPREHEN METABOLIC PANEL: CPT

## 2024-02-05 PROCEDURE — 82272 OCCULT BLD FECES 1-3 TESTS: CPT

## 2024-02-05 PROCEDURE — 6370000000 HC RX 637 (ALT 250 FOR IP): Performed by: NURSE PRACTITIONER

## 2024-02-05 PROCEDURE — 93005 ELECTROCARDIOGRAM TRACING: CPT | Performed by: EMERGENCY MEDICINE

## 2024-02-05 PROCEDURE — 70549 MR ANGIOGRAPH NECK W/O&W/DYE: CPT

## 2024-02-05 PROCEDURE — 82962 GLUCOSE BLOOD TEST: CPT

## 2024-02-05 PROCEDURE — 85027 COMPLETE CBC AUTOMATED: CPT

## 2024-02-05 PROCEDURE — 83036 HEMOGLOBIN GLYCOSYLATED A1C: CPT

## 2024-02-05 RX ORDER — DEXTROSE MONOHYDRATE 100 MG/ML
INJECTION, SOLUTION INTRAVENOUS CONTINUOUS PRN
Status: DISCONTINUED | OUTPATIENT
Start: 2024-02-05 | End: 2024-02-09 | Stop reason: HOSPADM

## 2024-02-05 RX ORDER — BIOTIN 2500 MCG
2.5 CAPSULE ORAL DAILY
Status: DISCONTINUED | OUTPATIENT
Start: 2024-02-05 | End: 2024-02-05 | Stop reason: CLARIF

## 2024-02-05 RX ORDER — ACETAMINOPHEN 325 MG/1
650 TABLET ORAL EVERY 4 HOURS PRN
Status: DISCONTINUED | OUTPATIENT
Start: 2024-02-05 | End: 2024-02-09 | Stop reason: HOSPADM

## 2024-02-05 RX ORDER — VITAMIN E 268 MG
400 CAPSULE ORAL DAILY
COMMUNITY

## 2024-02-05 RX ORDER — PANTOPRAZOLE SODIUM 40 MG/1
40 TABLET, DELAYED RELEASE ORAL 2 TIMES DAILY
Status: DISCONTINUED | OUTPATIENT
Start: 2024-02-05 | End: 2024-02-09 | Stop reason: HOSPADM

## 2024-02-05 RX ORDER — GABAPENTIN 100 MG/1
300 CAPSULE ORAL 3 TIMES DAILY
Status: DISCONTINUED | OUTPATIENT
Start: 2024-02-05 | End: 2024-02-09 | Stop reason: HOSPADM

## 2024-02-05 RX ORDER — SODIUM CHLORIDE 9 MG/ML
INJECTION, SOLUTION INTRAVENOUS ONCE
Status: COMPLETED | OUTPATIENT
Start: 2024-02-05 | End: 2024-02-05

## 2024-02-05 RX ORDER — EZETIMIBE 10 MG/1
10 TABLET ORAL DAILY
Status: DISCONTINUED | OUTPATIENT
Start: 2024-02-06 | End: 2024-02-09 | Stop reason: HOSPADM

## 2024-02-05 RX ORDER — DILTIAZEM HYDROCHLORIDE 240 MG/1
240 CAPSULE, COATED, EXTENDED RELEASE ORAL DAILY
Status: DISCONTINUED | OUTPATIENT
Start: 2024-02-06 | End: 2024-02-09 | Stop reason: HOSPADM

## 2024-02-05 RX ORDER — LOSARTAN POTASSIUM 25 MG/1
100 TABLET ORAL DAILY
Status: DISCONTINUED | OUTPATIENT
Start: 2024-02-06 | End: 2024-02-09 | Stop reason: HOSPADM

## 2024-02-05 RX ORDER — OXCARBAZEPINE 150 MG/1
900 TABLET, FILM COATED ORAL NIGHTLY
Status: DISCONTINUED | OUTPATIENT
Start: 2024-02-05 | End: 2024-02-09 | Stop reason: HOSPADM

## 2024-02-05 RX ORDER — INSULIN LISPRO 100 [IU]/ML
0-4 INJECTION, SOLUTION INTRAVENOUS; SUBCUTANEOUS NIGHTLY
Status: DISCONTINUED | OUTPATIENT
Start: 2024-02-05 | End: 2024-02-09 | Stop reason: HOSPADM

## 2024-02-05 RX ORDER — INSULIN LISPRO 100 [IU]/ML
0-8 INJECTION, SOLUTION INTRAVENOUS; SUBCUTANEOUS
Status: DISCONTINUED | OUTPATIENT
Start: 2024-02-05 | End: 2024-02-09 | Stop reason: HOSPADM

## 2024-02-05 RX ORDER — DOXEPIN HYDROCHLORIDE 25 MG/1
150 CAPSULE ORAL
Status: DISCONTINUED | OUTPATIENT
Start: 2024-02-05 | End: 2024-02-09 | Stop reason: HOSPADM

## 2024-02-05 RX ORDER — UBIDECARENONE 75 MG
100 CAPSULE ORAL DAILY
Status: DISCONTINUED | OUTPATIENT
Start: 2024-02-06 | End: 2024-02-09 | Stop reason: HOSPADM

## 2024-02-05 RX ORDER — ASPIRIN 81 MG/1
81 TABLET ORAL DAILY
Status: DISCONTINUED | OUTPATIENT
Start: 2024-02-06 | End: 2024-02-09 | Stop reason: HOSPADM

## 2024-02-05 RX ORDER — FERROUS GLUCONATE 324(38)MG
324 TABLET ORAL
Status: DISCONTINUED | OUTPATIENT
Start: 2024-02-06 | End: 2024-02-05 | Stop reason: CLARIF

## 2024-02-05 RX ORDER — OXYCODONE HYDROCHLORIDE AND ACETAMINOPHEN 5; 325 MG/1; MG/1
1 TABLET ORAL
Status: COMPLETED | OUTPATIENT
Start: 2024-02-05 | End: 2024-02-05

## 2024-02-05 RX ADMIN — GABAPENTIN 300 MG: 100 CAPSULE ORAL at 22:05

## 2024-02-05 RX ADMIN — PANTOPRAZOLE SODIUM 40 MG: 40 TABLET, DELAYED RELEASE ORAL at 22:06

## 2024-02-05 RX ADMIN — SODIUM CHLORIDE 100 ML: 900 INJECTION, SOLUTION INTRAVENOUS at 10:39

## 2024-02-05 RX ADMIN — DOXEPIN HYDROCHLORIDE 150 MG: 25 CAPSULE ORAL at 22:05

## 2024-02-05 RX ADMIN — ACETAMINOPHEN 650 MG: 325 TABLET ORAL at 23:36

## 2024-02-05 RX ADMIN — GADOTERIDOL 13 ML: 279.3 INJECTION, SOLUTION INTRAVENOUS at 10:38

## 2024-02-05 RX ADMIN — OXYCODONE HYDROCHLORIDE AND ACETAMINOPHEN 1 TABLET: 5; 325 TABLET ORAL at 15:41

## 2024-02-05 RX ADMIN — OXCARBAZEPINE 900 MG: 150 TABLET, FILM COATED ORAL at 23:36

## 2024-02-05 ASSESSMENT — ENCOUNTER SYMPTOMS
SINUS PAIN: 0
VOMITING: 0
COLOR CHANGE: 0
COUGH: 0
SORE THROAT: 0
STRIDOR: 0
RHINORRHEA: 0
EYES NEGATIVE: 1
DIARRHEA: 0
BLOOD IN STOOL: 0
WHEEZING: 0
SINUS PRESSURE: 0
CHEST TIGHTNESS: 0
BACK PAIN: 0
TROUBLE SWALLOWING: 0
ABDOMINAL PAIN: 0
NAUSEA: 0
SHORTNESS OF BREATH: 0

## 2024-02-05 ASSESSMENT — PAIN DESCRIPTION - LOCATION
LOCATION: ANKLE
LOCATION: FOOT

## 2024-02-05 ASSESSMENT — PAIN SCALES - GENERAL
PAINLEVEL_OUTOF10: 9
PAINLEVEL_OUTOF10: 9

## 2024-02-05 ASSESSMENT — PAIN - FUNCTIONAL ASSESSMENT
PAIN_FUNCTIONAL_ASSESSMENT: NONE - DENIES PAIN
PAIN_FUNCTIONAL_ASSESSMENT: PREVENTS OR INTERFERES WITH MANY ACTIVE NOT PASSIVE ACTIVITIES

## 2024-02-05 ASSESSMENT — PAIN DESCRIPTION - PAIN TYPE: TYPE: ACUTE PAIN

## 2024-02-05 ASSESSMENT — PAIN DESCRIPTION - FREQUENCY: FREQUENCY: CONTINUOUS

## 2024-02-05 ASSESSMENT — PAIN DESCRIPTION - DESCRIPTORS: DESCRIPTORS: SHARP

## 2024-02-05 ASSESSMENT — PAIN DESCRIPTION - ORIENTATION
ORIENTATION: RIGHT
ORIENTATION: RIGHT

## 2024-02-05 NOTE — H&P
History and Physical    Date of Service:  2/5/2024  Primary Care Provider: Jeana Aleman MD  Source of information: Chart review, family    Chief Complaint: Altered Mental Status      History of Presenting Illness:   Vance Bee is a 78 y.o. female who presents with altered mental status.  Patient was admitted MRI for outpatient follow-up when she began staring off, altered mental status, rhythmic or movement, no loss of consciousness, RRT called and brought to the emergency room, daughter reports symptoms ongoing since August when she had a stroke, today when he arrived, routine lab sodium 131 potassium 4.6 BUN 10 creatinine 0.64 glucose 130, hemoglobin 7.2, 7.1, hematocrit 24.3 at 24.8, last hemoglobin 10.6 12.1 in August 2023, CT scan of head without contrastLow shows no acute intracranial abnormalities, CTA of head and neck August 2023 shows moderate stenosis left common carotid, 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, 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, 2 mm aneurysm right carotid artery, 2 mm aneurysm of PCA, review medical record shows patient was discharged from our hospital 8/6/2023 8/3/2023, she was admitted for acute dyspnea on MCA infarct, brain aneurysm, bipolar disorder, insulin-dependent diabetes mellitus with neuropathy, hypertension, paroxysmal atrial fibrillation, chronic hyponatremia, on discharge patient was on aspirin 81 mg, Plavix, vitamin B12, Cardizem 240, doxepin, Zetia, gabapentin, glipizide, metformin, Trileptal, Ozempic, and he arrived in the emergency room emergency room physician seen and evaluated, review patient not indicated prior history of stroke involving some residual weakness or numbness in right upper extremity and right lower extremity, she had a fall 1 week ago for discharge.  Provoked pulling her right foot, orthopedic surgery seen, no evaluation of head injury large bruise on the forehead, sent for MRI, was shaking, she had

## 2024-02-05 NOTE — CONSULTS
Neuro consult done. Formal note to follow. Routine EEG ordered and being placed now.    Mercy Orozco, [facility]urology

## 2024-02-05 NOTE — ED TRIAGE NOTES
Patient was in MRI for outpatient follow-up when she began staring off, was altered and having rhythmic arm movement. No LOC. RRT was called and patient was brought to ER. Daughter states this has been on-going since her stroke in August.

## 2024-02-05 NOTE — ED PROVIDER NOTES
[facility] EMERGENCY DEP  EMERGENCY DEPARTMENT ENCOUNTER      Pt Name: Vance Bee  MRN: [id number]  Birthdate 1945  Date of evaluation: 2/5/2024  Provider: Jai Coleman MD    CHIEF COMPLAINT       Chief Complaint   Patient presents with    Altered Mental Status         HISTORY OF PRESENT ILLNESS    HPI  This is an 78-year-old female who has a  prior history of stroke involving some residual weakness and numbness in the right upper extremity and right lower extremity.  A week or so ago she had a fall and struck her head and broke 2 bones in her right foot.  She was seen by orthopedics and was never seen for the head injury.  She has a large bruise on her forehead extending down below the eyes bilaterally which appears consistent with a week old injury.  She was in an MRI having a scan today and apparently had an episode where she developed some altered mental status, shaking with just the right hand and subsequently has had some difficulty getting her thoughts out.  In speaking with the daughter, the difficulty with thought has been a chronic problem since her stroke but becoming more significant and today it seems much worse than in the past.  She has never had a definitive seizure.  She is followed by Dr. Henson.  She has had no fever or chill and denies any shortness of breath, nausea or vomiting or other acute symptomatology.    Review of External Medical Records:     Nursing Notes were reviewed.    REVIEW OF SYSTEMS       Review of Systems   Constitutional:  Negative for activity change, chills, fatigue and fever.   HENT:  Negative for congestion, ear pain, rhinorrhea, sinus pressure, sinus pain, sore throat and trouble swallowing.         Head contusion     Eyes: Negative.    Respiratory:  Negative for cough, chest tightness, shortness of breath, wheezing and stridor.    Cardiovascular:  Negative for chest pain, palpitations and leg swelling.   Gastrointestinal:  Negative for abdominal pain, blood in     CYANOCOBALAMIN 100 MCG TABLET    Take 1 tablet by mouth daily    DILTIAZEM (CARDIZEM CD) 240 MG EXTENDED RELEASE CAPSULE    Take 1 capsule by mouth daily    DOXEPIN (SINEQUAN) 150 MG CAPSULE    Take 1 capsule by mouth nightly    EZETIMIBE (ZETIA) 10 MG TABLET    Take 1 tablet by mouth daily    FERROUS GLUCONATE (FERGON) 324 (38 FE) MG TABLET    1 tablet with water or juice between meals Orally twice a day for 90 day(s)    FLUOCINOLONE ACETONIDE (SYNALAR) 0.01 % EXTERNAL SOLUTION    APPLY A SMALL AMOUNT OF SOLUTION TOPICALLY TO AFFECTED AREAS OF SCALP ONCE DAILY TO TWICE DAILY AS NEEDED MASSAGE IN GENTLY    GABAPENTIN (NEURONTIN) 300 MG CAPSULE    Take 1 capsule by mouth 3 times daily.    GLIPIZIDE (GLUCOTROL XL) 5 MG EXTENDED RELEASE TABLET    TAKE ONE TABLET BY MOUTH DAILY for 90    KETOCONAZOLE (NIZORAL) 2 % SHAMPOO    APPLY SHAMPOO TO SCALP TOPICALLY AT LEAST TWICE A WEEK AS DIRECTED    LOSARTAN (COZAAR) 100 MG TABLET    Take 1 tablet by mouth daily    MAGNESIUM 400 MG TABS    Take 400 Units by mouth daily    METFORMIN (GLUCOPHAGE) 500 MG TABLET    Take 2 tablets by mouth 2 times daily (with meals)    OXCARBAZEPINE (TRILEPTAL) 300 MG TABLET    Take 3 tablets by mouth at bedtime    OZEMPIC, 0.25 OR 0.5 MG/DOSE, 2 MG/3ML SOPN    INJECT 0.5 MG SUBCUTANEOUSLY ONCE A WEEK    PANTOPRAZOLE (PROTONIX) 40 MG TABLET    Take 1 tablet by mouth in the morning and at bedtime    VITAMIN MIXTURE (VITAMIN E COMPLETE PO)    Take by mouth       ALLERGIES     Codeine, Elemental sulfur, Statins, Sulfa antibiotics, Tilactase, and Morphine    FAMILY HISTORY       Family History   Problem Relation Age of Onset    Breast Cancer Sister     Colon Polyps Other         2 cousins    Cancer Other         Aunt, uncle    Stroke Other         aunt          SOCIAL HISTORY       Social History     Socioeconomic History    Marital status:    Tobacco Use    Smoking status: Former     Current packs/day: 0.50     Types: Cigarettes      range of motion. No rigidity.      Comments: There is ecchymosis without deformity noted in the lower extremity proximal to the ankle approximately 8 inches and down onto the dorsum of the foot as well, consistent with recent fractures.   Skin:     General: Skin is warm and dry.      Capillary Refill: Capillary refill takes 2 to 3 seconds.   Neurological:      Mental Status: She is alert.      Cranial Nerves: No cranial nerve deficit, dysarthria or facial asymmetry.      Sensory: No sensory deficit.      Motor: No weakness.      Deep Tendon Reflexes: Reflexes normal.   Psychiatric:         Mood and Affect: Mood normal.         Speech: Speech normal.         Behavior: Behavior normal.         DIAGNOSTIC RESULTS     EKG:   [facility] EKG interpretation: There is a sinus rhythm at 56 beats a minute.  Axis and intervals are normal.  No ectopy or acute ischemia noted      RADIOLOGY:     No acute process identified.      CT HEAD WO CONTRAST    (Results Pending)        LABS:  Labs Reviewed   CBC - Abnormal; Notable for the following components:       Result Value    RBC 3.20 (*)     Hemoglobin 7.2 (*)     Hematocrit 24.3 (*)     MCV 75.9 (*)     MCH 22.5 (*)     MCHC 29.6 (*)     RDW 16.0 (*)     All other components within normal limits   COMPREHENSIVE METABOLIC PANEL - Abnormal; Notable for the following components:    Sodium 131 (*)     Glucose 130 (*)     AST 9 (*)     Alk Phosphatase 133 (*)     Total Protein 6.0 (*)     Albumin 3.4 (*)     All other components within normal limits   EXTRA TUBES HOLD   EXTRA TUBES HOLD       All other labs were within normal range or not returned as of this dictation.    EMERGENCY DEPARTMENT COURSE and DIFFERENTIAL DIAGNOSIS/MDM:   Vitals:    Vitals:    02/05/24 1102   BP: (!) 114/49   Pulse: 53   Resp: 20   Temp: 97.8 °F (36.6 °C)   TempSrc: Oral   SpO2: 94%   Weight: 68.4 kg (150 lb 12.8 oz)   Height: 1.524 m (5')           Medical Decision Making  This is an 78-year-old female who

## 2024-02-05 NOTE — PROGRESS NOTES
Admission Medication Reconciliation:    Information obtained from:  patient  RxQuery data available¹:  Yes    Comments/Recommendations: Updated PTA meds/reviewed patient's allergies.    1)  Medication changes (since last review):  Added  - n/a    Adjusted  - n/a    Removed  - glipizide    3)  missed her dose of Ozempic on Sunday. Informed patient that she will need to provide this medication as it is not stocked at [facility].        ¹RxQuery pharmacy benefit data reflects medications filled and processed through the patient's insurance, however   this data does NOT capture whether the medication was picked up or is currently being taken by the patient.    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    Carotid bruit 4/12/2011    Chest discomfort 4/12/2011    CVA (cerebral vascular accident) ([facility]) 08/2023    Diabetes ([facility])     Fainting     GERD (gastroesophageal reflux disease)     High cholesterol     Hypertension     Neuropathy     Other ill-defined conditions(799.89)     celiac disease    Psychiatric disorder     depression/anxiety    Pure hypercholesterolemia 4/12/2011    Vertigo     Visual disturbance      Chief Complaint for this Admission:    Chief Complaint   Patient presents with    Altered Mental Status     Prior to Admission Medications:   Prior to Admission Medications   Prescriptions Last Dose Informant   Biotin 2500 MCG CAPS 2/5/2024    Sig: Take 2.5 mg by mouth daily   Calcium Carbonate-Vitamin D 600-3.125 MG-MCG TABS 2/5/2024    Sig: Take 1 tablet by mouth daily   Magnesium 400 MG TABS 2/5/2024    Sig: Take 400 Units by mouth daily   OXcarbazepine (TRILEPTAL) 300 MG tablet 2/4/2024    Sig: Take 3 tablets by mouth at bedtime   OZEMPIC, 0.25 OR 0.5 MG/DOSE, 2 MG/3ML SOPN 1/28/2024    Sig:

## 2024-02-06 ENCOUNTER — CLINICAL DOCUMENTATION (OUTPATIENT)
Age: 79
End: 2024-02-06

## 2024-02-06 ENCOUNTER — APPOINTMENT (OUTPATIENT)
Facility: HOSPITAL | Age: 79
DRG: 056 | End: 2024-02-06
Payer: MEDICARE

## 2024-02-06 PROBLEM — R55 SYNCOPE AND COLLAPSE: Status: ACTIVE | Noted: 2024-02-06

## 2024-02-06 PROBLEM — Z86.73 CHRONIC ISCHEMIC LEFT MCA STROKE: Status: ACTIVE | Noted: 2024-02-06

## 2024-02-06 LAB
ANION GAP SERPL CALC-SCNC: 4 MMOL/L (ref 5–15)
BUN SERPL-MCNC: 8 MG/DL (ref 6–20)
BUN/CREAT SERPL: 19 (ref 12–20)
CALCIUM SERPL-MCNC: 8.8 MG/DL (ref 8.5–10.1)
CHLORIDE SERPL-SCNC: 104 MMOL/L (ref 97–108)
CO2 SERPL-SCNC: 25 MMOL/L (ref 21–32)
CREAT SERPL-MCNC: 0.42 MG/DL (ref 0.55–1.02)
EKG ATRIAL RATE: 56 BPM
EKG DIAGNOSIS: NORMAL
EKG P AXIS: 44 DEGREES
EKG P-R INTERVAL: 188 MS
EKG Q-T INTERVAL: 424 MS
EKG QRS DURATION: 76 MS
EKG QTC CALCULATION (BAZETT): 409 MS
EKG R AXIS: 47 DEGREES
EKG T AXIS: 37 DEGREES
EKG VENTRICULAR RATE: 56 BPM
GLUCOSE BLD STRIP.AUTO-MCNC: 116 MG/DL (ref 65–117)
GLUCOSE BLD STRIP.AUTO-MCNC: 120 MG/DL (ref 65–117)
GLUCOSE BLD STRIP.AUTO-MCNC: 132 MG/DL (ref 65–117)
GLUCOSE BLD STRIP.AUTO-MCNC: 152 MG/DL (ref 65–117)
GLUCOSE SERPL-MCNC: 122 MG/DL (ref 65–100)
HCT VFR BLD AUTO: 23.5 % (ref 35–47)
HCT VFR BLD AUTO: 24.3 % (ref 35–47)
HCT VFR BLD AUTO: 25 % (ref 35–47)
HCT VFR BLD AUTO: 31.1 % (ref 35–47)
HGB BLD-MCNC: 7.1 G/DL (ref 11.5–16)
HGB BLD-MCNC: 7.4 G/DL (ref 11.5–16)
HGB BLD-MCNC: 7.6 G/DL (ref 11.5–16)
HGB BLD-MCNC: 9.2 G/DL (ref 11.5–16)
HISTORY CHECK: NORMAL
HISTORY CHECK: NORMAL
POTASSIUM SERPL-SCNC: 4.3 MMOL/L (ref 3.5–5.1)
SERVICE CMNT-IMP: ABNORMAL
SERVICE CMNT-IMP: NORMAL
SODIUM SERPL-SCNC: 133 MMOL/L (ref 136–145)

## 2024-02-06 PROCEDURE — P9016 RBC LEUKOCYTES REDUCED: HCPCS

## 2024-02-06 PROCEDURE — 86900 BLOOD TYPING SEROLOGIC ABO: CPT

## 2024-02-06 PROCEDURE — 73600 X-RAY EXAM OF ANKLE: CPT

## 2024-02-06 PROCEDURE — 2060000000 HC ICU INTERMEDIATE R&B

## 2024-02-06 PROCEDURE — 36430 TRANSFUSION BLD/BLD COMPNT: CPT

## 2024-02-06 PROCEDURE — 86901 BLOOD TYPING SEROLOGIC RH(D): CPT

## 2024-02-06 PROCEDURE — 4A10X4Z MONITORING OF CENTRAL NERVOUS ELECTRICAL ACTIVITY, EXTERNAL APPROACH: ICD-10-PCS | Performed by: PSYCHIATRY & NEUROLOGY

## 2024-02-06 PROCEDURE — 36415 COLL VENOUS BLD VENIPUNCTURE: CPT

## 2024-02-06 PROCEDURE — 86850 RBC ANTIBODY SCREEN: CPT

## 2024-02-06 PROCEDURE — 97535 SELF CARE MNGMENT TRAINING: CPT

## 2024-02-06 PROCEDURE — 99221 1ST HOSP IP/OBS SF/LOW 40: CPT | Performed by: PHYSICIAN ASSISTANT

## 2024-02-06 PROCEDURE — 30233N1 TRANSFUSION OF NONAUTOLOGOUS RED BLOOD CELLS INTO PERIPHERAL VEIN, PERCUTANEOUS APPROACH: ICD-10-PCS | Performed by: PSYCHIATRY & NEUROLOGY

## 2024-02-06 PROCEDURE — 82962 GLUCOSE BLOOD TEST: CPT

## 2024-02-06 PROCEDURE — 97165 OT EVAL LOW COMPLEX 30 MIN: CPT

## 2024-02-06 PROCEDURE — 97530 THERAPEUTIC ACTIVITIES: CPT

## 2024-02-06 PROCEDURE — 80048 BASIC METABOLIC PNL TOTAL CA: CPT

## 2024-02-06 PROCEDURE — 93010 ELECTROCARDIOGRAM REPORT: CPT | Performed by: SPECIALIST

## 2024-02-06 PROCEDURE — 70553 MRI BRAIN STEM W/O & W/DYE: CPT

## 2024-02-06 PROCEDURE — 85014 HEMATOCRIT: CPT

## 2024-02-06 PROCEDURE — 6370000000 HC RX 637 (ALT 250 FOR IP): Performed by: NURSE PRACTITIONER

## 2024-02-06 PROCEDURE — 99232 SBSQ HOSP IP/OBS MODERATE 35: CPT | Performed by: NURSE PRACTITIONER

## 2024-02-06 PROCEDURE — 97161 PT EVAL LOW COMPLEX 20 MIN: CPT

## 2024-02-06 PROCEDURE — 6370000000 HC RX 637 (ALT 250 FOR IP): Performed by: FAMILY MEDICINE

## 2024-02-06 PROCEDURE — 6360000004 HC RX CONTRAST MEDICATION: Performed by: NURSE PRACTITIONER

## 2024-02-06 PROCEDURE — 99223 1ST HOSP IP/OBS HIGH 75: CPT | Performed by: INTERNAL MEDICINE

## 2024-02-06 PROCEDURE — 85018 HEMOGLOBIN: CPT

## 2024-02-06 PROCEDURE — A9579 GAD-BASE MR CONTRAST NOS,1ML: HCPCS | Performed by: NURSE PRACTITIONER

## 2024-02-06 PROCEDURE — 86923 COMPATIBILITY TEST ELECTRIC: CPT

## 2024-02-06 RX ORDER — SODIUM CHLORIDE 9 MG/ML
INJECTION, SOLUTION INTRAVENOUS PRN
Status: DISCONTINUED | OUTPATIENT
Start: 2024-02-06 | End: 2024-02-07

## 2024-02-06 RX ADMIN — ASPIRIN 81 MG: 81 TABLET, COATED ORAL at 08:30

## 2024-02-06 RX ADMIN — GABAPENTIN 300 MG: 100 CAPSULE ORAL at 21:16

## 2024-02-06 RX ADMIN — Medication 1 TABLET: at 08:30

## 2024-02-06 RX ADMIN — DILTIAZEM HYDROCHLORIDE 240 MG: 240 CAPSULE, COATED, EXTENDED RELEASE ORAL at 08:30

## 2024-02-06 RX ADMIN — PANTOPRAZOLE SODIUM 40 MG: 40 TABLET, DELAYED RELEASE ORAL at 21:16

## 2024-02-06 RX ADMIN — EZETIMIBE 10 MG: 10 TABLET ORAL at 08:29

## 2024-02-06 RX ADMIN — ACETAMINOPHEN 650 MG: 325 TABLET ORAL at 21:15

## 2024-02-06 RX ADMIN — GABAPENTIN 300 MG: 100 CAPSULE ORAL at 15:33

## 2024-02-06 RX ADMIN — Medication 100 MCG: at 08:30

## 2024-02-06 RX ADMIN — GADOTERIDOL 13 ML: 279.3 INJECTION, SOLUTION INTRAVENOUS at 05:55

## 2024-02-06 RX ADMIN — LOSARTAN POTASSIUM 100 MG: 25 TABLET, FILM COATED ORAL at 08:29

## 2024-02-06 RX ADMIN — GABAPENTIN 300 MG: 100 CAPSULE ORAL at 08:30

## 2024-02-06 RX ADMIN — PANTOPRAZOLE SODIUM 40 MG: 40 TABLET, DELAYED RELEASE ORAL at 08:30

## 2024-02-06 RX ADMIN — OXCARBAZEPINE 900 MG: 150 TABLET, FILM COATED ORAL at 21:16

## 2024-02-06 RX ADMIN — DOXEPIN HYDROCHLORIDE 150 MG: 25 CAPSULE ORAL at 21:16

## 2024-02-06 ASSESSMENT — PAIN DESCRIPTION - LOCATION: LOCATION: FOOT

## 2024-02-06 ASSESSMENT — PAIN DESCRIPTION - ORIENTATION: ORIENTATION: RIGHT

## 2024-02-06 ASSESSMENT — PAIN DESCRIPTION - DESCRIPTORS: DESCRIPTORS: ACHING

## 2024-02-06 ASSESSMENT — PAIN SCALES - GENERAL: PAINLEVEL_OUTOF10: 6

## 2024-02-06 NOTE — CONSULTS
aneurysm of anterior communicating artery.       Assessment and Plan       Altered mental status, Syncope/+LOC:   Pt has been wearing a cardiac event monitor since 1/24,  her LOC episode on 1/31 there were no EKG related causes.    Her monitor was removed on 2/5 prior to her MRI, so there is no information related to her episode after her MRI yesterday.     There was one abnormality noted on event monitor on 2/4 at 4pm, pt is in junctional and unlikely to explain her other symptoms.     Today on telemetry, there is evidence of  intermittent Type II heart block Wenckebach with underlying sinus rhythm/sinus bradycardia..     PENDING EEG.   Orthostatic Vitals:  Semi fowlers 158/60 HR 93  1021am   Sitting 90/46  HR 85   1023am  Sitting  112/56 HR 78   1029am   Supine 143/92   1030am    While she does have orthostatic blood pressure drop, many of her symptoms have occurred when she is not standing and hence it is hard to draw conclusion between orthostatic blood pressure drop and her symptoms.    2. Hx of CVA 8/2023: Per Neuro-interventional surgery, The MRI is suggestive of a left thalamic  distribution infarct.   Moderate stenosis of LICA, moderate severe stenosis of vertebral artery.   DAPT PTA, currently held. Some R sided residual deficits    3.  Cerebral aneurysms:  Repeat MRIs from 2/5 and 2/6 report stable sizing, with no large vessel occlusion or vascular stenosis.      4. Hx of A fib: was not anticoagulated (hx of falls).   On diltiazem.  Has been on DAPT    5. Acute anemia: hx of iron deficient, has not been taking supplementation.  Hgb 7.0s.   fecal occult negative.  DAPT/OAC on hold.  May be a candidate for watchman in the future    6.  Episodes of junctional bradycardia/sinus bradycardia with Wenckebach.  These were incidentally picked up on her monitoring.  No evidence of any ventricular tachycardia.  Above-noted episodes are unlikely to explain her symptoms and do not require any further    09/05/2020 0.48 (L)       Current meds:    Current Facility-Administered Medications:     aspirin EC tablet 81 mg, 81 mg, Oral, Daily, Satya Vega MD, 81 mg at 02/06/24 0830    vitamin B-12 (CYANOCOBALAMIN) tablet 100 mcg, 100 mcg, Oral, Daily, Satya Vega MD, 100 mcg at 02/06/24 0830    dilTIAZem (CARDIZEM CD) extended release capsule 240 mg, 240 mg, Oral, Daily, Satya Vega MD, 240 mg at 02/06/24 0830    doxepin (SINEQUAN) capsule 150 mg, 150 mg, Oral, QHS, Satya Vega MD, 150 mg at 02/05/24 2205    ezetimibe (ZETIA) tablet 10 mg, 10 mg, Oral, Daily, Satya Vega MD, 10 mg at 02/06/24 0829    gabapentin (NEURONTIN) capsule 300 mg, 300 mg, Oral, TID, Satya Vega MD, 300 mg at 02/06/24 0830    losartan (COZAAR) tablet 100 mg, 100 mg, Oral, Daily, Satya Vega MD, 100 mg at 02/06/24 0829    OXcarbazepine (TRILEPTAL) tablet 900 mg, 900 mg, Oral, Nightly, Satya Vega MD, 900 mg at 02/05/24 2336    pantoprazole (PROTONIX) tablet 40 mg, 40 mg, Oral, BID, Satya Vega MD, 40 mg at 02/06/24 0830    glucose chewable tablet 16 g, 4 tablet, Oral, PRN, Satya Vega MD    dextrose bolus 10% 125 mL, 125 mL, IntraVENous, PRN **OR** dextrose bolus 10% 250 mL, 250 mL, IntraVENous, PRN, Satya Vega MD    glucagon injection 1 mg, 1 mg, SubCUTAneous, PRN, Satya Vega MD    dextrose 10 % infusion, , IntraVENous, Continuous PRN, Satya Vega MD    insulin lispro (HUMALOG) injection vial 0-8 Units, 0-8 Units, SubCUTAneous, TID WC, Satya Vega MD    insulin lispro (HUMALOG) injection vial 0-4 Units, 0-4 Units, SubCUTAneous, Nightly, Satya Vega MD    oyster shell calcium w/D 500-5 MG-MCG tablet 1 tablet, 1 tablet, Oral, Daily, Satya Vega MD, 1 tablet at 02/06/24 0830    acetaminophen (TYLENOL) tablet 650 mg, 650 mg, Oral, Q4H PRN, Jo Ann Thakur APRN - NP, 650 mg at 02/05/24 3226    TANO Moore - CNP    [facility]  Call center: (P) [phone]  (F)  [phone]

## 2024-02-06 NOTE — PROCEDURES
[facility]  EEG    Name:  EBONIE SHARMA  MR#:  [id number]  :  1945  ACCOUNT #:  [id number]  DATE OF SERVICE:  2024      REQUESTING PHYSICIAN:  Mercy Orozco NP    HISTORY:  The patient is an 78-year-old female who is being evaluated for recurrent falls/syncopal events.  She has also been noted to have staring off episodes with rhythmic arm movement.    DESCRIPTION:  This is an 18-channel EEG performed on an awake patient.  The dominant posterior background rhythm consists of symmetric, very well-modulated, medium-to-high voltage rhythms in the 8-9 Hz frequency range out of the posterior head region.  Faster frequencies are seen in the frontal areas.  Drowsiness and sleep states are not recorded.  Photic stimulation and hyperventilation was not performed.    EEG SUMMARY:  Normal study.    CLINICAL INTERPRETATION:  This was a normal EEG during wakeful state of the patient.  No lateralizing or epileptiform features were noted.      CONSTANCE SNYDER MD      [id number]  D:  2024 13:29  T:  2024 14:52  JOB #:  [id number]

## 2024-02-06 NOTE — CONSULTS
NEUROLOGY CONSULT NOTE    Name Vance Bee Age 78 y.o.   MRN [id number]  1945     Consulting Physician: No att. providers found      Chief Complaint:  unresponsive event     Assessment:     Principal Problem:    AMS (altered mental status)  Resolved Problems:    * No resolved hospital problems. *      Patient is an 78 year-old female with history of bipolar disorder, HTN, DM2, afib not on OAC, L MCA stroke 2023 and numerous syncopal events who presents today following event where she was staring and rhythmic, arm movements. Patient reports she had had negative work-up thus far for similar events. Despite [id number] score 6, patent risk of falls too significant to start OAC. Her cardiac records are unavailable, but she states she has had outpatient cardiac monitoring twice (second monitor off for MRI) with negative work-up thus far.   MRA head/neck (today) with stable R CAROLYN aneurysm and previous R ICA terminus aneurysm not weel visualize. CT head negative.     Multiple syncopal events. Concern for seizures given multiple events and reported negative cardiac work-up thus far. Cannot rule-out orthostatic or vasovagal events.   Recommendations:   - Obtain routine EEG  - MRI brain w wo contrast to eval for potential seizure focus  - Low threshold to start empiric low-dose AED  - Obtain orthostatics  - Obtain most recent outpatient cardiac monitoring results  - PT/OT consults need to speak with daughter to get better characterization of events  -     Will f/u with patient tomorrow.   History of Present Illness:      This is a 78 y.o.female with history of bipolar disorder, HTN, DM2, and afib not on OAC, L MCA stroke 2023, and multiple falls with syncope who presents today after having unresponsive, starring event after MRA today witnessed by daughter. Daughter is not available at the bedside but patient tells me that she was having a routine MRA head to eval known aneurysm and followed by   Yes

## 2024-02-06 NOTE — CARE COORDINATION
Care Management Initial Assessment       RUR: 18% Moderate  Readmission? No  1st IM letter given? 2/6/24  1st  letter given: NA    Patient presented to the ED on 2/5/24 with altered mental status at the time of an outpatient MRI. Patient had a stroke in August 2023 and has a history of bipolar disorder, HTN, DM2, afib not on OAC, L MCA stroke 8/2023 and numerous syncopal events. She has no history of home health or rehab but is agreeable to home health. Patient's daughter assists as needed and will provide transportation home. Patient may DC tomorrow. Patient requests bedside commode. CM to monitor.         02/06/24 1521   Service Assessment   Patient Orientation Alert and Oriented;Person;Place;Situation;Self   Cognition Alert   History Provided By Patient   Primary Caregiver Self   Accompanied By/Relationship Daughter Kelsea Yang, [phone]   Support Systems Children   PCP Verified by CM Yes  (Dr. Jeana Aleman-seen last year)   Last Visit to PCP Within last year   Prior Functional Level Independent in ADLs/IADLs   Current Functional Level Independent in ADLs/IADLs   Can patient return to prior living arrangement Yes   Ability to make needs known: Good   Family able to assist with home care needs: Yes   Financial Resources Medicare   Community Resources None   Social/Functional History   Lives With Alone   Type of Home House   Home Layout Two level   Home Equipment Walker, 4 wheeled;Walker, standard;Wheelchair-manual;Lift chair   Receives Help From Family   ADL Assistance Independent   Homemaking Assistance Needs assistance   Ambulation Assistance Independent   Transfer Assistance Independent   Mode of Transportation Car   Discharge Planning   Type of Residence House   Living Arrangements Alone   Current Services Prior To Admission None   Potential Assistance Needed Home Care   DME Ordered? No   Potential Assistance Purchasing Medications No  ([facility])   Type of Home Care Services OT;PT    Patient expects to be discharged to: House   One/Two Story Residence Two story   Services At/After Discharge   Transition of Care Consult (CM Consult) Home Health   Services At/After Discharge Home Health   Confirm Follow Up Transport Family   Condition of Participation: Discharge Planning   The Plan for Transition of Care is related to the following treatment goals: Home with home health   The Patient and/or Patient Representative was provided with a Choice of Provider? Patient   The Patient and/Or Patient Representative agree with the Discharge Plan? Yes   Freedom of Choice list was provided with basic dialogue that supports the patient's individualized plan of care/goals, treatment preferences, and shares the quality data associated with the providers?  Yes       Michelle Salvador, MS  [phone]

## 2024-02-06 NOTE — PROGRESS NOTES
[facility]                                                                                          Hospitalist Progress Note  Satya Vega MD  Office Phone: [phone]        Date of Service:  2024  NAME:  Vance Bee  :  1945  MRN:  [id number]       Admission Summary:   Vance Bee is a 78 y.o. female who presents with altered mental status.  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, 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, 2 mm aneurysm right carotid artery, 2 mm aneurysm of PCA, review medical record shows patient was discharged from our hospital 2023 8/3/2023, she was admitted for acute dyspnea on MCA infarct, brain aneurysm, bipolar disorder, insulin-dependent diabetes mellitus with neuropathy, hypertension, paroxysmal atrial fibrillation, chronic hyponatremia, on discharge patient was on aspirin 81 mg, Plavix, vitamin B12, Cardizem 240, doxepin, Zetia, gabapentin, glipizide, metformin, Trileptal, Ozempic, and he arrived in the emergency room emergency room physician seen and evaluated, review patient not indicated prior history of stroke involving some residual weakness or numbness in right upper extremity and right lower extremity, she had a fall 1 week ago for discharge.  Provoked pulling her right foot, orthopedic surgery  OXcarbazepine (TRILEPTAL) tablet 900 mg  900 mg Oral Nightly    pantoprazole (PROTONIX) tablet 40 mg  40 mg Oral BID    glucose chewable tablet 16 g  4 tablet Oral PRN    dextrose bolus 10% 125 mL  125 mL IntraVENous PRN    Or    dextrose bolus 10% 250 mL  250 mL IntraVENous PRN    glucagon injection 1 mg  1 mg SubCUTAneous PRN    dextrose 10 % infusion   IntraVENous Continuous PRN    insulin lispro (HUMALOG) injection vial 0-8 Units  0-8 Units SubCUTAneous TID WC    insulin lispro (HUMALOG) injection vial 0-4 Units  0-4 Units SubCUTAneous Nightly    oyster shell calcium w/D 500-5 MG-MCG tablet 1 tablet  1 tablet Oral Daily    acetaminophen (TYLENOL) tablet 650 mg  650 mg Oral Q4H PRN     ______________________________________________________________________  EXPECTED LENGTH OF STAY: 3  ACTUAL LENGTH OF STAY:          1                 Satya Vega MD

## 2024-02-06 NOTE — CONSULTS
ORTHO CONSULT NOTE    Date of Consultation:  February 6, 2024  Referring Physician:  MD Gary  CC: R ankle pain    HPI:  Vance Bee is a 78 y.o. female who c/o R ankle pain after injury on 2/1. She was seen at [facility] and diagnosed with R Medial and lateral malleolus fractures, given a boot and told to be nonweightbearing. She had an appointment to be seen today by Dr. Velasquez Anderson, but was admitted for AMS, which has since cleared up. Pain elevated slighter yesterday and has some significant swelling, which rapidly resolved with elevation and is feeling much better today. Pain is mild, tolerable with medications. She has a boot at the bedside which she states is comfortable.     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    Carotid bruit 4/12/2011    Chest discomfort 4/12/2011    CVA (cerebral vascular accident) ([facility]) 08/2023    Diabetes ([facility])     Fainting     GERD (gastroesophageal reflux disease)     High cholesterol     Hypertension     Neuropathy     Other ill-defined conditions(799.89)     celiac disease    Psychiatric disorder     depression/anxiety    Pure hypercholesterolemia 4/12/2011    Vertigo     Visual disturbance       Past Surgical History:   Procedure Laterality Date    APPENDECTOMY      GYN      tubal ligation    GYN      cyst removed from ovary at same time as appendectomy    HEENT      skin cancer removed from ear    HEENT      bilateral cataracts removed    HEENT      sinus surgery    ORTHOPEDIC SURGERY      back    OTHER SURGICAL HISTORY      colonoscopy x 1 - hx colon polyps    ND UNLISTED PROCEDURE CARDIAC SURGERY      cardiac cath - normal    ANGELITO AND BSO (CERVIX REMOVED)        Family History   Problem Relation Age of Onset    Breast Cancer Sister     Colon Polyps Other         2 cousins    Cancer Other         Aunt, uncle    Stroke Other         aunt      Social  swelling. Osteopenia.  MRI BRAIN W WO CONTRAST  Narrative: EXAM:  MRI BRAIN W WO CONTRAST    INDICATION: 78-year-old female with multiple syncopal events-rule out stroke and  seizure focus    COMPARISON: 2/5/2024 CT and MRAs with 8/4/2023 MRI.    CONTRAST:  13 mL of ProHance.    TECHNIQUE:    Multiplanar multisequence acquisition of the brain prior to and following  IV  contrast administration. Additional T2-weighted imaging sequences with special  focus on the medial temporal lobes were also acquired.    FINDINGS:  There is no acute infarct, hemorrhage, extra-axial fluid collection, or mass  effect.     Mild diffuse periventricular and moderate patchy periventricular/subcortical  white matter T2/FLAIR hyperintensity, nonspecific and likely microangiopathic  ischemic changes.. The ventricles are normal in size and position, noting  probable central predominant mild diffuse volume loss which is similar to prior  studies. Incidental notice of cavum septum pellucidum, developmental variant.  The medial temporal lobes, including the hippocampal formations are bilaterally  symmetric and grossly normal in size and signal intensity with preserved  internal architecture. No obvious mass lesions seen.  There is no cerebellar tonsillar herniation. Expected arterial flow-voids are  present. Dural venous sinuses are patent and without filling defect to suggest  thrombosis.  No mass or abnormal enhancement    Mild diffuse mucosal thickening of the paranasal sinuses. Moderate right mastoid  effusion, progressed since prior MRI. Status post bilateral lense replacement.  Mildly distended bilateral optic nerve perineural CSF with flattening of the  left optic cup, may suggest papilledema. No significant osseous or scalp lesions  are identified. Visualized craniocervical junction and proximal cervical spine  grossly within normal limits.  Impression: No acute brain infarct or intracranial hemorrhage.  No abnormality of the medial

## 2024-02-06 NOTE — ED NOTES
Bedside and Verbal shift change report given to Taurus (oncoming nurse) by Anu (offgoing nurse). Report included the following information Nurse Handoff Report, ED Encounter Summary, ED SBAR, Intake/Output, MAR, and Recent Results.

## 2024-02-06 NOTE — PLAN OF CARE
Problem: Physical Therapy - Adult  Goal: By Discharge: Performs mobility at highest level of function for planned discharge setting.  See evaluation for individualized goals.  Description: FUNCTIONAL STATUS PRIOR TO ADMISSION: Pt with recent RLE fx and has been limited to transfers to/from her W/C with Cam boot donned, NWB. +non-mechanical fall hx.    HOME SUPPORT PRIOR TO ADMISSION: The patient lived alone with local family to provide assistance for driving/grocery needs.    Physical Therapy Goals  Initiated 2/6/2024  1.  Patient will move from supine to sit and sit to supine in bed with supervision/set-up within 7 day(s).    2.  Patient will perform sit to stand with maxA assistance at RW with RLE NWB (boot) within 7 day(s).  3.  Patient will transfer from bed to chair and chair to bed with maximal assistance using the least restrictive device within 7 day(s).  4.  Patient will demonstrate seated EOB tolerance x8 minutes with supervision within 7 day(s).   5. Patient will demonstrate supine therex for BP elevation (x10 reps) prior to bed mobility with supervision/verbal cues only within 7 days.      Outcome: Progressing   PHYSICAL THERAPY EVALUATION    Patient: Vance Bee (78 y.o. female)  Date: 2/6/2024  Primary Diagnosis: Altered mental status, unspecified altered mental status type [R41.82]  AMS (altered mental status) [R41.82]       Precautions: Restrictions/Precautions: Fall Risk (RLE NWB; +OH)  Required Braces or Orthoses?: Yes Required Braces or Orthoses?: Yes             Required Braces or Orthoses  Right Lower Extremity Brace: Boot  RLE Brace Type: Cam Walker      ASSESSMENT :   DEFICITS/IMPAIRMENTS:   Pt seen following RN clearance. Pt admitted from outside MRI facility due to AMS and sz-like presentation (\"rhythmic arm movement\"). Pt pleasant and agreeable to POC: BP checks with supine to sit and post transfer to BSC. Unable to progress beyond seated EOB due to symptom onset while bending fwd to  History  Lives With: Alone  Type of Home: House  Home Layout: Two level, 1/2 bath on main level  Home Access: Stairs to enter with rails  Entrance Stairs - Number of Steps: Stair lift to second floor; 3 ROGER  Entrance Stairs - Rails: Both  Bathroom Shower/Tub: Tub/Shower unit  Bathroom Equipment: Shower chair, Grab bars in shower (Plan to install grab bars)  Home Equipment: Walker, rolling, Walker, standard, Wheelchair-manual, Lift chair (Chair lift)  Has the patient had two or more falls in the past year or any fall with injury in the past year?: Yes (3 falls: increased dizziness)  Receives Help From: Family  ADL Assistance: Independent  Ambulation Assistance: Independent  Active : No    Cognitive/Behavioral Status:  Orientation  Overall Orientation Status: Within Normal Limits  Orientation Level: Oriented X4    Edema: R ankle    Hearing:   Hearing  Hearing: Within functional limits    Vision/Perceptual:          Vision  Vision: Impaired  Vision Exceptions: Wears glasses at all times       Strength:    Strength: Generally decreased, functional    Tone & Sensation:   Tone: Normal  Sensation: Impaired (RUE/RLE impaired per baseline)    Coordination:  Coordination: Generally decreased, functional    Range Of Motion:  AROM: Generally decreased, functional (R ankle NT)       Functional Mobility:  Bed Mobility:  Bed Mobility Training  Bed Mobility Training: Yes  Supine to Sit: Supervision;Additional time (HOB elevated and rail used)  Sit to Supine: Minimum assistance;Additional time (pt returning to bed w/ S/S +OH)  Transfers:  Transfer Training  Transfer Training: No  Balance:   Balance  Sitting: Impaired  Sitting - Static: Good (unsupported)  Sitting - Dynamic: Fair (occasional) (decreased seated balance with attempts to don boot and symptoms onset)                                                                                                                                                                          [id number].  4. Leon GALICIA, Bernadette S, Ranjan W, Lexii P. AM-PAC Short Forms Manual 4.0. Revised 2/2020.                                                                                                                                                                                                                              Pain Rating:  NT    Activity Tolerance:   Poor, requires frequent rest breaks, observed shortness of breath on exertion, and signs and symptoms of orthostatic hypotension    After treatment:   Patient left in no apparent distress in bed, Call bell within reach, Caregiver / family present, Side rails x3, and Heels elevated for pressure relief    COMMUNICATION/EDUCATION:   The patient's plan of care was discussed with: occupational therapist and registered nurse    Patient Education  Education Given To: Patient;Family  Education Provided: Role of Therapy;Plan of Care;Fall Prevention Strategies  Education Provided Comments: Pt and DTR educated on +OH/monitoring symptoms/rec bed in chair postition for meals  Education Method: Demonstration;Verbal  Barriers to Learning: None  Education Outcome: Verbalized understanding;Continued education needed    Thank you for this referral.  DUSTY HELM, PT  Minutes: 38      Physical Therapy Evaluation Charge Determination   History Examination Presentation Decision-Making   LOW Complexity : Zero comorbidities / personal factors that will impact the outcome / POC LOW Complexity : 1-2 Standardized tests and measures addressing body structure, function, activity limitation and / or participation in recreation  LOW Complexity : Stable, uncomplicated  AM-PAC  MEDIUM   Based on the above components, the patient evaluation is determined to be of the following complexity level: Low

## 2024-02-06 NOTE — PROGRESS NOTES
Oral Daily    gabapentin (NEURONTIN) capsule 300 mg  300 mg Oral TID    losartan (COZAAR) tablet 100 mg  100 mg Oral Daily    OXcarbazepine (TRILEPTAL) tablet 900 mg  900 mg Oral Nightly    pantoprazole (PROTONIX) tablet 40 mg  40 mg Oral BID    glucose chewable tablet 16 g  4 tablet Oral PRN    dextrose bolus 10% 125 mL  125 mL IntraVENous PRN    Or    dextrose bolus 10% 250 mL  250 mL IntraVENous PRN    glucagon injection 1 mg  1 mg SubCUTAneous PRN    dextrose 10 % infusion   IntraVENous Continuous PRN    insulin lispro (HUMALOG) injection vial 0-8 Units  0-8 Units SubCUTAneous TID WC    insulin lispro (HUMALOG) injection vial 0-4 Units  0-4 Units SubCUTAneous Nightly    oyster shell calcium w/D 500-5 MG-MCG tablet 1 tablet  1 tablet Oral Daily    acetaminophen (TYLENOL) tablet 650 mg  650 mg Oral Q4H PRN       BP (!) 160/57   Pulse 80   Temp 98.4 °F (36.9 °C) (Oral)   Resp 18   Ht 1.524 m (5')   Wt 68.4 kg (150 lb 12.8 oz)   SpO2 99%   BMI 29.45 kg/m²   Temp (24hrs), Av.5 °F (36.9 °C), Min:98.1 °F (36.7 °C), Max:99 °F (37.2 °C)       0701 -  1900  In: -   Out: 1600 [Urine:1600]  No intake/output data recorded.      Physical Exam:  General: Well developed well nourished patient in no apparent distress.   Cardiac: Regular rate and rhythm with no murmurs.   Carotids: 2+ symmetric, no bruits  Extremities: 2+ Radial pulses, no cyanosis or edema    Neurological Exam:  Mental Status: Oriented to time, place and person. Speech and language intact. Attention and fund of knowledge appropriate.  Normal recent and remote memory.   Cranial Nerves:   VFF, PERRL, EOMI, no nystagmus, no ptosis. Facial sensation is normal. Facial movement is symmetric.     Motor:  5/5 strength in upper and lower proximal and distal muscles. Normal bulk and tone. No PD. No tremors   Reflexes:      Sensory:   Intact to LT and PP   Gait:     Cerebellar:           Lab Review   Recent Results (from the past 24 hour(s))   POCT  02/06/2024    Narrative  EXAM:  MRI BRAIN W WO CONTRAST    INDICATION: 78-year-old female with multiple syncopal events-rule out stroke and  seizure focus    COMPARISON: 2/5/2024 CT and MRAs with 8/4/2023 MRI.    CONTRAST:  13 mL of ProHance.    TECHNIQUE:  Multiplanar multisequence acquisition of the brain prior to and following  IV  contrast administration. Additional T2-weighted imaging sequences with special  focus on the medial temporal lobes were also acquired.      FINDINGS:  There is no acute infarct, hemorrhage, extra-axial fluid collection, or mass  effect.    Mild diffuse periventricular and moderate patchy periventricular/subcortical  white matter T2/FLAIR hyperintensity, nonspecific and likely microangiopathic  ischemic changes.. The ventricles are normal in size and position, noting  probable central predominant mild diffuse volume loss which is similar to prior  studies. Incidental notice of cavum septum pellucidum, developmental variant.  The medial temporal lobes, including the hippocampal formations are bilaterally  symmetric and grossly normal in size and signal intensity with preserved  internal architecture. No obvious mass lesions seen.  There is no cerebellar tonsillar herniation. Expected arterial flow-voids are  present. Dural venous sinuses are patent and without filling defect to suggest  thrombosis.  No mass or abnormal enhancement    Mild diffuse mucosal thickening of the paranasal sinuses. Moderate right mastoid  effusion, progressed since prior MRI. Status post bilateral lense replacement.  Mildly distended bilateral optic nerve perineural CSF with flattening of the  left optic cup, may suggest papilledema. No significant osseous or scalp lesions  are identified. Visualized craniocervical junction and proximal cervical spine  grossly within normal limits.    Impression  No acute brain infarct or intracranial hemorrhage.  No abnormality of the medial temporal lobes or obvious  epileptogenic lesion.    Moderate chronic microangiopathic white matter changes and probable central  predominant nonspecific brain atrophy.  Additional findings suspicious for papilledema which may also suggest elevated  intracranial pressure. Clinical correlation advised.    Worsening right mastoid effusion.    CT Result (most recent):  CT HEAD WO CONTRAST 02/05/2024    Narrative  EXAM: CT HEAD WO CONTRAST    INDICATION: Trauma    COMPARISON: 8/3/2023    TECHNIQUE: Routine CT of the head was performed without intravenous contrast.  Coronal and sagittal reconstructions were generated. CT dose reduction was  achieved through use of a standardized protocol tailored for this examination  and automatic exposure control for dose modulation.    FINDINGS:  The ventricles are normal size and configuration.    There is no mass, mass effect, or acute hemorrhage. There is no CT evidence of  acute ischemia..  There is periventricular hypodensity compatible with chronic small vessel  disease.    There are no acute bony abnormalities.    Paranasal sinuses and mastoid air cells appear well-aerated.    Impression  No acute intracranial abnormalities.      Care Plan discussed with:  Patient x   Family x   RN    Care Manager    Consultant/Specialist:       Signed:TANO [place] - CARLOS    I spent >50% of a 35-minute visit counseling/coordination of care with the patient and daughter at the bedside.

## 2024-02-06 NOTE — PLAN OF CARE
Problem: Occupational Therapy - Adult  Goal: By Discharge: Performs self-care activities at highest level of function for planned discharge setting.  See evaluation for individualized goals.  Description: FUNCTIONAL STATUS PRIOR TO ADMISSION: Pt reports she lives alone in two level home with a stair lift. Pt was IND with ADLs prior to admission and has a daughter nearby that assists with IADLs PRN.    HOME SUPPORT: Patient lived alone with nearby daughter to provide assistance.    Occupational Therapy Goals:  Initiated 2/6/2024  1.  Patient will perform grooming task seated with Supervision within 7 day(s).  2.  Patient will perform upper body dressing seated with Supervision within 7 day(s).  3.  Patient will perform lower body dressing seated with Stand by Assist within 7 day(s).  4.  Patient will perform toilet transfers with Stand by Assist  within 7 day(s).  5.  Patient will perform all aspects of toileting with Stand by Assist within 7 day(s).  6.  Patient will participate in upper extremity therapeutic exercise/activities with Modified Ruth for 5 minutes within 7 day(s).    7.  Patient will utilize energy conservation techniques during functional activities with verbal and visual cues within 7 day(s).    Outcome: Progressing     OCCUPATIONAL THERAPY EVALUATION    Patient: Vance Bee (78 y.o. female)  Date: 2/6/2024  Primary Diagnosis: Altered mental status, unspecified altered mental status type [R41.82]  AMS (altered mental status) [R41.82]         Precautions: Fall Risk (RLE NWB; +OH)                  ASSESSMENT :  The patient is limited by decreased functional mobility, independence in ADLs, high-level IADLs, ROM, strength, body mechanics, activity tolerance, endurance, coordination, balance, orthostatic hypotension, increased pain levels.    Pt received to OT services semi-reclined in bed, amendable to session. Pt's supportive daughter present for session. See flow sheet below for VS

## 2024-02-07 ENCOUNTER — TELEPHONE (OUTPATIENT)
Age: 79
End: 2024-02-07

## 2024-02-07 ENCOUNTER — TELEPHONE (OUTPATIENT)
Facility: HOSPITAL | Age: 79
End: 2024-02-07

## 2024-02-07 LAB
ABO + RH BLD: NORMAL
ANION GAP SERPL CALC-SCNC: 7 MMOL/L (ref 5–15)
BASOPHILS # BLD: 0 K/UL (ref 0–0.1)
BASOPHILS NFR BLD: 1 % (ref 0–1)
BLD PROD TYP BPU: NORMAL
BLOOD BANK DISPENSE STATUS: NORMAL
BLOOD GROUP ANTIBODIES SERPL: NORMAL
BPU ID: NORMAL
BUN SERPL-MCNC: 6 MG/DL (ref 6–20)
BUN/CREAT SERPL: 15 (ref 12–20)
CALCIUM SERPL-MCNC: 9.1 MG/DL (ref 8.5–10.1)
CHLORIDE SERPL-SCNC: 104 MMOL/L (ref 97–108)
CO2 SERPL-SCNC: 24 MMOL/L (ref 21–32)
CORTIS AM PEAK SERPL-MCNC: 22.9 UG/DL (ref 4.3–22.45)
CREAT SERPL-MCNC: 0.41 MG/DL (ref 0.55–1.02)
CROSSMATCH RESULT: NORMAL
DIFFERENTIAL METHOD BLD: ABNORMAL
EOSINOPHIL # BLD: 0.2 K/UL (ref 0–0.4)
EOSINOPHIL NFR BLD: 3 % (ref 0–7)
ERYTHROCYTE [DISTWIDTH] IN BLOOD BY AUTOMATED COUNT: 17.2 % (ref 11.5–14.5)
FOLATE SERPL-MCNC: 18.3 NG/ML (ref 5–21)
GLUCOSE BLD STRIP.AUTO-MCNC: 140 MG/DL (ref 65–117)
GLUCOSE BLD STRIP.AUTO-MCNC: 142 MG/DL (ref 65–117)
GLUCOSE BLD STRIP.AUTO-MCNC: 152 MG/DL (ref 65–117)
GLUCOSE BLD STRIP.AUTO-MCNC: 164 MG/DL (ref 65–117)
GLUCOSE SERPL-MCNC: 178 MG/DL (ref 65–100)
HCT VFR BLD AUTO: 29.3 % (ref 35–47)
HCT VFR BLD AUTO: 31.8 % (ref 35–47)
HGB BLD-MCNC: 8.8 G/DL (ref 11.5–16)
HGB BLD-MCNC: 9.5 G/DL (ref 11.5–16)
IMM GRANULOCYTES # BLD AUTO: 0 K/UL (ref 0–0.04)
IMM GRANULOCYTES NFR BLD AUTO: 0 % (ref 0–0.5)
IRON SATN MFR SERPL: 6 % (ref 20–50)
IRON SERPL-MCNC: 25 UG/DL (ref 35–150)
LYMPHOCYTES # BLD: 1.5 K/UL (ref 0.8–3.5)
LYMPHOCYTES NFR BLD: 26 % (ref 12–49)
MCH RBC QN AUTO: 23.3 PG (ref 26–34)
MCHC RBC AUTO-ENTMCNC: 30 G/DL (ref 30–36.5)
MCV RBC AUTO: 77.5 FL (ref 80–99)
MONOCYTES # BLD: 0.6 K/UL (ref 0–1)
MONOCYTES NFR BLD: 10 % (ref 5–13)
NEUTS SEG # BLD: 3.5 K/UL (ref 1.8–8)
NEUTS SEG NFR BLD: 60 % (ref 32–75)
NRBC # BLD: 0 K/UL (ref 0–0.01)
NRBC BLD-RTO: 0 PER 100 WBC
PLATELET # BLD AUTO: 300 K/UL (ref 150–400)
PMV BLD AUTO: 9.6 FL (ref 8.9–12.9)
POTASSIUM SERPL-SCNC: 4 MMOL/L (ref 3.5–5.1)
RBC # BLD AUTO: 3.78 M/UL (ref 3.8–5.2)
SERVICE CMNT-IMP: ABNORMAL
SODIUM SERPL-SCNC: 135 MMOL/L (ref 136–145)
SPECIMEN EXP DATE BLD: NORMAL
TIBC SERPL-MCNC: 413 UG/DL (ref 250–450)
UNIT DIVISION: 0
VIT B12 SERPL-MCNC: 862 PG/ML (ref 193–986)
WBC # BLD AUTO: 5.8 K/UL (ref 3.6–11)

## 2024-02-07 PROCEDURE — 85014 HEMATOCRIT: CPT

## 2024-02-07 PROCEDURE — 83540 ASSAY OF IRON: CPT

## 2024-02-07 PROCEDURE — 85018 HEMOGLOBIN: CPT

## 2024-02-07 PROCEDURE — 2060000000 HC ICU INTERMEDIATE R&B

## 2024-02-07 PROCEDURE — 82607 VITAMIN B-12: CPT

## 2024-02-07 PROCEDURE — 82962 GLUCOSE BLOOD TEST: CPT

## 2024-02-07 PROCEDURE — 97530 THERAPEUTIC ACTIVITIES: CPT

## 2024-02-07 PROCEDURE — 83550 IRON BINDING TEST: CPT

## 2024-02-07 PROCEDURE — 82533 TOTAL CORTISOL: CPT

## 2024-02-07 PROCEDURE — 80048 BASIC METABOLIC PNL TOTAL CA: CPT

## 2024-02-07 PROCEDURE — 36415 COLL VENOUS BLD VENIPUNCTURE: CPT

## 2024-02-07 PROCEDURE — 97535 SELF CARE MNGMENT TRAINING: CPT

## 2024-02-07 PROCEDURE — 82746 ASSAY OF FOLIC ACID SERUM: CPT

## 2024-02-07 PROCEDURE — 6370000000 HC RX 637 (ALT 250 FOR IP): Performed by: NURSE PRACTITIONER

## 2024-02-07 PROCEDURE — 6370000000 HC RX 637 (ALT 250 FOR IP): Performed by: FAMILY MEDICINE

## 2024-02-07 PROCEDURE — 85025 COMPLETE CBC W/AUTO DIFF WBC: CPT

## 2024-02-07 PROCEDURE — 99233 SBSQ HOSP IP/OBS HIGH 50: CPT | Performed by: NURSE PRACTITIONER

## 2024-02-07 RX ORDER — OXCARBAZEPINE 150 MG/1
600 TABLET, FILM COATED ORAL EVERY MORNING
Status: DISCONTINUED | OUTPATIENT
Start: 2024-02-14 | End: 2024-02-09 | Stop reason: HOSPADM

## 2024-02-07 RX ORDER — OXCARBAZEPINE 150 MG/1
300 TABLET, FILM COATED ORAL EVERY MORNING
Status: DISCONTINUED | OUTPATIENT
Start: 2024-02-07 | End: 2024-02-09 | Stop reason: HOSPADM

## 2024-02-07 RX ADMIN — DILTIAZEM HYDROCHLORIDE 240 MG: 240 CAPSULE, COATED, EXTENDED RELEASE ORAL at 09:45

## 2024-02-07 RX ADMIN — PANTOPRAZOLE SODIUM 40 MG: 40 TABLET, DELAYED RELEASE ORAL at 09:45

## 2024-02-07 RX ADMIN — PANTOPRAZOLE SODIUM 40 MG: 40 TABLET, DELAYED RELEASE ORAL at 22:09

## 2024-02-07 RX ADMIN — ASPIRIN 81 MG: 81 TABLET, COATED ORAL at 09:45

## 2024-02-07 RX ADMIN — Medication 100 MCG: at 09:45

## 2024-02-07 RX ADMIN — DOXEPIN HYDROCHLORIDE 150 MG: 25 CAPSULE ORAL at 22:08

## 2024-02-07 RX ADMIN — GABAPENTIN 300 MG: 100 CAPSULE ORAL at 22:08

## 2024-02-07 RX ADMIN — OXCARBAZEPINE 900 MG: 150 TABLET, FILM COATED ORAL at 22:09

## 2024-02-07 RX ADMIN — GABAPENTIN 300 MG: 100 CAPSULE ORAL at 09:45

## 2024-02-07 RX ADMIN — Medication 1 TABLET: at 09:45

## 2024-02-07 RX ADMIN — GABAPENTIN 300 MG: 100 CAPSULE ORAL at 13:15

## 2024-02-07 RX ADMIN — OXCARBAZEPINE 300 MG: 150 TABLET, FILM COATED ORAL at 13:16

## 2024-02-07 RX ADMIN — LOSARTAN POTASSIUM 100 MG: 25 TABLET, FILM COATED ORAL at 09:45

## 2024-02-07 RX ADMIN — EZETIMIBE 10 MG: 10 TABLET ORAL at 09:45

## 2024-02-07 ASSESSMENT — PAIN DESCRIPTION - LOCATION: LOCATION: FOOT

## 2024-02-07 ASSESSMENT — PAIN SCALES - GENERAL: PAINLEVEL_OUTOF10: 5

## 2024-02-07 ASSESSMENT — PAIN - FUNCTIONAL ASSESSMENT: PAIN_FUNCTIONAL_ASSESSMENT: PREVENTS OR INTERFERES SOME ACTIVE ACTIVITIES AND ADLS

## 2024-02-07 ASSESSMENT — PAIN DESCRIPTION - DESCRIPTORS: DESCRIPTORS: ACHING

## 2024-02-07 ASSESSMENT — PAIN DESCRIPTION - PAIN TYPE: TYPE: ACUTE PAIN

## 2024-02-07 ASSESSMENT — PAIN DESCRIPTION - FREQUENCY: FREQUENCY: CONTINUOUS

## 2024-02-07 ASSESSMENT — PAIN DESCRIPTION - ORIENTATION: ORIENTATION: RIGHT

## 2024-02-07 NOTE — PROGRESS NOTES
[facility]                                                                                          Hospitalist Progress Note  Amber Mccarthy MD  Office Phone: [phone]        Date of Service:  2024  NAME:  Vance Bee  :  1945  MRN:  [id number]       Admission Summary:   Vance Bee is a 78 y.o. female who presents with altered mental status.  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, 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, 2 mm aneurysm right carotid artery, 2 mm aneurysm of PCA, review medical record shows patient was discharged from our hospital 2023 8/3/2023, she was admitted for acute dyspnea on MCA infarct, brain aneurysm, bipolar disorder, insulin-dependent diabetes mellitus with neuropathy, hypertension, paroxysmal atrial fibrillation, chronic hyponatremia, on discharge patient was on aspirin 81 mg, Plavix, vitamin B12, Cardizem 240, doxepin, Zetia, gabapentin, glipizide, metformin, Trileptal, Ozempic, and he arrived in the emergency room emergency room physician seen and evaluated, review patient not indicated prior history of stroke involving some residual weakness or numbness in right upper extremity and right lower extremity, she had a fall 1 week ago for discharge.  Provoked pulling her right foot, orthopedic surgery  Facility-Administered Medications   Medication Dose Route Frequency    OXcarbazepine (TRILEPTAL) tablet 300 mg  300 mg Oral QAM    [START ON 2/14/2024] OXcarbazepine (TRILEPTAL) tablet 600 mg  600 mg Oral QAM    0.9 % sodium chloride infusion   IntraVENous PRN    0.9 % sodium chloride infusion   IntraVENous PRN    aspirin EC tablet 81 mg  81 mg Oral Daily    vitamin B-12 (CYANOCOBALAMIN) tablet 100 mcg  100 mcg Oral Daily    dilTIAZem (CARDIZEM CD) extended release capsule 240 mg  240 mg Oral Daily    doxepin (SINEQUAN) capsule 150 mg  150 mg Oral QHS    ezetimibe (ZETIA) tablet 10 mg  10 mg Oral Daily    gabapentin (NEURONTIN) capsule 300 mg  300 mg Oral TID    losartan (COZAAR) tablet 100 mg  100 mg Oral Daily    OXcarbazepine (TRILEPTAL) tablet 900 mg  900 mg Oral Nightly    pantoprazole (PROTONIX) tablet 40 mg  40 mg Oral BID    glucose chewable tablet 16 g  4 tablet Oral PRN    dextrose bolus 10% 125 mL  125 mL IntraVENous PRN    Or    dextrose bolus 10% 250 mL  250 mL IntraVENous PRN    glucagon injection 1 mg  1 mg SubCUTAneous PRN    dextrose 10 % infusion   IntraVENous Continuous PRN    insulin lispro (HUMALOG) injection vial 0-8 Units  0-8 Units SubCUTAneous TID WC    insulin lispro (HUMALOG) injection vial 0-4 Units  0-4 Units SubCUTAneous Nightly    oyster shell calcium w/D 500-5 MG-MCG tablet 1 tablet  1 tablet Oral Daily    acetaminophen (TYLENOL) tablet 650 mg  650 mg Oral Q4H PRN     ______________________________________________________________________  EXPECTED LENGTH OF STAY: 4  ACTUAL LENGTH OF STAY:          2                 Amber Mccarthy MD

## 2024-02-07 NOTE — PROGRESS NOTES
Arrived for PT and patient has just returned to bed. Pt declined at this time. Will continue to follow    Lynda Delgado, SABINET, PT

## 2024-02-07 NOTE — TELEPHONE ENCOUNTER
----- Message from Narinder Larkin MD sent at 2/6/2024  9:03 AM EST -----  4 pm on 2/4 monitor showed junctional rhythm 73 bpm with dizziness sob, fatigue, chest pain

## 2024-02-07 NOTE — TELEPHONE ENCOUNTER
When I saw Dr. Larkin's message below yesterday I pulled up patient's chart and see she was admitted to the [facility] on 2/5 so no call made to patient.

## 2024-02-07 NOTE — TELEPHONE ENCOUNTER
Pt needs a hospital follow up appointment  Provider: Dr. Henson  Location: [facility]  In person or virtual: In person  When: 6-8 weeks   Diagnosis/reason for follow up:  suspected seizures  Additional information: started on BID Trileptal (was already on in evening for bipolar d/o and started am dosing as well)

## 2024-02-07 NOTE — PLAN OF CARE
Problem: Occupational Therapy - Adult  Goal: By Discharge: Performs self-care activities at highest level of function for planned discharge setting.  See evaluation for individualized goals.  Description: FUNCTIONAL STATUS PRIOR TO ADMISSION: Pt reports she lives alone in two level home with a stair lift. Pt was IND with ADLs prior to admission and has a daughter nearby that assists with IADLs PRN.    HOME SUPPORT: Patient lived alone with nearby daughter to provide assistance.    Occupational Therapy Goals:  Initiated 2/6/2024  1.  Patient will perform grooming task seated with Supervision within 7 day(s).  2.  Patient will perform upper body dressing seated with Supervision within 7 day(s).  3.  Patient will perform lower body dressing seated with Stand by Assist within 7 day(s).  4.  Patient will perform toilet transfers with Stand by Assist  within 7 day(s).  5.  Patient will perform all aspects of toileting with Stand by Assist within 7 day(s).  6.  Patient will participate in upper extremity therapeutic exercise/activities with Modified Forgan for 5 minutes within 7 day(s).    7.  Patient will utilize energy conservation techniques during functional activities with verbal and visual cues within 7 day(s).    Outcome: Progressing     OCCUPATIONAL THERAPY TREATMENT  Patient: Vance Bee (78 y.o. female)  Date: 2/7/2024  Primary Diagnosis: Altered mental status, unspecified altered mental status type [R41.82]  AMS (altered mental status) [R41.82]       Precautions: Fall Risk (RLE NWB; +OH)                Chart, occupational therapy assessment, plan of care, and goals were reviewed.    ASSESSMENT  Patient continues to benefit from skilled OT services and is progressing towards goals. Pt received to OT services semi-reclined in bed, amendable to session. Pt's daughter present for session. See flow sheet below for VS, no reports of dizziness with ADLs or related functional mobility. Pt required CGA to SPV  for all bed/functional mobility with RW for dynamic standing balance and VCs for adherence to NWB in RLE per ortho note 01/05/2024. Pt donned R CAM walker boot while seated EOB with SBA. Pt transferred to BSC and attempted to have a BM but was unsuccessful. Pt transferred back to semi-reclined in bed, repositioned with bed in chair, call bell within reach, all needs met. Provided verbal education to pt and pt's daughter present on ADL adaptive strategies, energy conservation techniques, and overall safety/insight following discharge home. OT recommends HH OT with increased assistance from daughter (who is staying with pt following discharge) with ADLs and related functional mobility.        02/07/24 1328 02/07/24 1339 02/07/24 1345   Vital Signs   BP (!) 169/77 124/64 (!) 140/51   MAP (Calculated) 108 84 81   BP Location Right upper arm Right upper arm Right upper arm   BP Method Automatic Automatic Automatic   Patient Position Semi fowlers Sitting  (on BSC) Sitting  (on BSC)        02/07/24 1400   Vital Signs   /77   MAP (Calculated) 98   BP Location Right upper arm   BP Method Automatic   Patient Position Sitting  (Bed in Chair)         PLAN :  Patient continues to benefit from skilled intervention to address the above impairments.  Continue treatment per established plan of care to address goals.    Recommend with staff: HOB elevated during meals 3x/day, encourage participation in ADLs, BSC for toileting    Recommend next OT session: EOB ADLs, cont. BSC transfers    Recommendation for discharge: (in order for the patient to meet his/her long term goals): Therapy 2 days/week in the home and also see \"other factors to consider\" below for additional discharge concerns/needs    Other factors to consider for discharge: high risk for falls, not safe to be alone, concern for safely navigating or managing the home environment, and new weight bearing restrictions limiting activity or patient is unable to

## 2024-02-07 NOTE — PROGRESS NOTES
A Spiritual Care Partner Volunteer visited Monroeerik ROBERT Bee at [facility] in [facility] on 2/7/2024     Documented by: Chaplain Ida Guardado

## 2024-02-07 NOTE — PROGRESS NOTES
Neurology Progress Note     NAME: Vance Bee   :  1945   MRN:  [id number]   DATE:  2024    Assessment:     Principal Problem:    AMS (altered mental status)  Active Problems:    Syncope and collapse    Chronic ischemic left MCA stroke  Resolved Problems:    * No resolved hospital problems. 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 She had just gone to meet with her daughter after having a routine outpatient MRA to evaluate her aneurysms. Patient reports she had had negative work-up thus far for similar events. Despite [id number]-Ouhi score 6, patient not on OAC presumably due to risk of falls. She states she has had outpatient cardiac monitoring twice (second monitor off for MRI) with negative work-up thus far. MRA head/neck (24) with stable R CAROLYN aneurysm and previous R ICA terminus aneurysm not well visualize. CT head negative. Routine EEG (24) negative. MRI brain w wo contrast negative for seizure foci; no acute infarct, mass lesions or other abnormalities.      Daughter is available at the bedside and states that patient has had numerous events but only 2 that were completely witnessed by her. She states following MRA patient suddenly began to have a blank stare and it was like the \"lights were on, but no one was home\" and she slumped in her chair. Her RUE then became stiff and started shaking. When she came to, she did not know where she was and could not answer questions. She had a similar incident in a grocery store around [facility] where her daughter saw her suddenly have stiff  arms while walking with a shopping cart. Her fingers became white while gripping. Again, she had \"deer in headlights\" gaze and daughter thought her eyes were about to roll back in her head. When she released she fell backwards into the shelves. Patient states she is always weak all over after it happens. Numerous times she cannot get up off the floor after syncope and will crawl on the floor then grab a blanket and sleep up to 8 hours. She was alone last Wednesday when she went to urinate after waking up in the am and then stood up and fell in the bathroom striking her face by the tub and then causing and non displaced R ankle fracture.  Patient states she develops a \"warm sensation\" and then feels lightheaded and begins sweating prior to the events. She then will lose tone and fall if standing sometimes but more often she will recognize when symptoms are starting and then go sit down and not have syncope. She can also have events while seated without syncope. Since her stroke in August, she feels they have become more frequent and now occurring almost daily. She lives alone with her Yorkie but daughter visits daily.     Routine EEG negative. On her monitor, she was noted to have episodes of junctional bradycardia/sinus bradycardia with Wenckebach. Cardiology feels they do not explain symptoms and do not require any further intervention. Vitals show orthostatic hypotension, however, as noted above, she can be in any position for syncope to occur (sitting or standing). She received 1 unit PRBC for acute anemia.    Multiple presyncope and syncope events. Likely related to 2 separate causes:  orthostatic hypotension and possible seizures.   Plan:   - Start oxcarbazepine 300 mg q am x 7 days, then increase to 600 mg q am. Continue to take evening dose of 900 mg currently taking for bipolar disorder.   - Do not feel patient is safe to live alone until syncope events are controlled. This was communicated with the daughter  and patient at the bedside.   - Plan now is discharge home with home health  - [facility] Dr. Mckee notified family would like results of MRA if possible while she is inpatient  - Daughter would like more of an update about cardiac plan. Dr. Mccarthy is aware of this.  - Patient will need to follow-up in the Neurology clinic in 6-8 weeks with Dr. Henson    No further recommendations. Please contact if any questions.   Subjective:   See above.    Objective:   Chart reviewed since last seen    Current Facility-Administered Medications   Medication Dose Route Frequency    OXcarbazepine (TRILEPTAL) tablet 300 mg  300 mg Oral QAM    [START ON 2/14/2024] OXcarbazepine (TRILEPTAL) tablet 600 mg  600 mg Oral QAM    0.9 % sodium chloride infusion   IntraVENous PRN    0.9 % sodium chloride infusion   IntraVENous PRN    aspirin EC tablet 81 mg  81 mg Oral Daily    vitamin B-12 (CYANOCOBALAMIN) tablet 100 mcg  100 mcg Oral Daily    dilTIAZem (CARDIZEM CD) extended release capsule 240 mg  240 mg Oral Daily    doxepin (SINEQUAN) capsule 150 mg  150 mg Oral QHS    ezetimibe (ZETIA) tablet 10 mg  10 mg Oral Daily    gabapentin (NEURONTIN) capsule 300 mg  300 mg Oral TID    losartan (COZAAR) tablet 100 mg  100 mg Oral Daily    OXcarbazepine (TRILEPTAL) tablet 900 mg  900 mg Oral Nightly    pantoprazole (PROTONIX) tablet 40 mg  40 mg Oral BID    glucose chewable tablet 16 g  4 tablet Oral PRN    dextrose bolus 10% 125 mL  125 mL IntraVENous PRN    Or    dextrose bolus 10% 250 mL  250 mL IntraVENous PRN    glucagon injection 1 mg  1 mg SubCUTAneous PRN    dextrose 10 % infusion   IntraVENous Continuous PRN    insulin lispro (HUMALOG) injection vial 0-8 Units  0-8 Units SubCUTAneous TID WC    insulin lispro (HUMALOG) injection vial 0-4 Units  0-4 Units SubCUTAneous Nightly    oyster shell calcium w/D 500-5 MG-MCG tablet 1 tablet  1 tablet Oral Daily    acetaminophen (TYLENOL) tablet 650 mg  650 mg Oral Q4H PRN       BP (!) 158/49   Pulse 72

## 2024-02-08 LAB
ANION GAP SERPL CALC-SCNC: 5 MMOL/L (ref 5–15)
BASOPHILS # BLD: 0 K/UL (ref 0–0.1)
BASOPHILS NFR BLD: 0 % (ref 0–1)
BUN SERPL-MCNC: 11 MG/DL (ref 6–20)
BUN/CREAT SERPL: 23 (ref 12–20)
CALCIUM SERPL-MCNC: 9.3 MG/DL (ref 8.5–10.1)
CHLORIDE SERPL-SCNC: 106 MMOL/L (ref 97–108)
CO2 SERPL-SCNC: 25 MMOL/L (ref 21–32)
CREAT SERPL-MCNC: 0.47 MG/DL (ref 0.55–1.02)
DIFFERENTIAL METHOD BLD: ABNORMAL
EOSINOPHIL # BLD: 0 K/UL (ref 0–0.4)
EOSINOPHIL NFR BLD: 0 % (ref 0–7)
ERYTHROCYTE [DISTWIDTH] IN BLOOD BY AUTOMATED COUNT: 17.4 % (ref 11.5–14.5)
GLUCOSE BLD STRIP.AUTO-MCNC: 129 MG/DL (ref 65–117)
GLUCOSE BLD STRIP.AUTO-MCNC: 141 MG/DL (ref 65–117)
GLUCOSE BLD STRIP.AUTO-MCNC: 149 MG/DL (ref 65–117)
GLUCOSE BLD STRIP.AUTO-MCNC: 157 MG/DL (ref 65–117)
GLUCOSE SERPL-MCNC: 145 MG/DL (ref 65–100)
HCT VFR BLD AUTO: 29.2 % (ref 35–47)
HGB BLD-MCNC: 9 G/DL (ref 11.5–16)
IMM GRANULOCYTES # BLD AUTO: 0 K/UL (ref 0–0.04)
IMM GRANULOCYTES NFR BLD AUTO: 0 % (ref 0–0.5)
LYMPHOCYTES # BLD: 1.4 K/UL (ref 0.8–3.5)
LYMPHOCYTES NFR BLD: 18 % (ref 12–49)
MCH RBC QN AUTO: 23.5 PG (ref 26–34)
MCHC RBC AUTO-ENTMCNC: 30.8 G/DL (ref 30–36.5)
MCV RBC AUTO: 76.2 FL (ref 80–99)
MONOCYTES # BLD: 0.7 K/UL (ref 0–1)
MONOCYTES NFR BLD: 9 % (ref 5–13)
NEUTS SEG # BLD: 5.6 K/UL (ref 1.8–8)
NEUTS SEG NFR BLD: 73 % (ref 32–75)
NRBC # BLD: 0 K/UL (ref 0–0.01)
NRBC BLD-RTO: 0 PER 100 WBC
PLATELET # BLD AUTO: 325 K/UL (ref 150–400)
PMV BLD AUTO: 9.7 FL (ref 8.9–12.9)
POTASSIUM SERPL-SCNC: 4.4 MMOL/L (ref 3.5–5.1)
RBC # BLD AUTO: 3.83 M/UL (ref 3.8–5.2)
SERVICE CMNT-IMP: ABNORMAL
SODIUM SERPL-SCNC: 136 MMOL/L (ref 136–145)
WBC # BLD AUTO: 7.8 K/UL (ref 3.6–11)

## 2024-02-08 PROCEDURE — 6370000000 HC RX 637 (ALT 250 FOR IP): Performed by: NURSE PRACTITIONER

## 2024-02-08 PROCEDURE — 36415 COLL VENOUS BLD VENIPUNCTURE: CPT

## 2024-02-08 PROCEDURE — 82962 GLUCOSE BLOOD TEST: CPT

## 2024-02-08 PROCEDURE — 85025 COMPLETE CBC W/AUTO DIFF WBC: CPT

## 2024-02-08 PROCEDURE — 2060000000 HC ICU INTERMEDIATE R&B

## 2024-02-08 PROCEDURE — 80048 BASIC METABOLIC PNL TOTAL CA: CPT

## 2024-02-08 PROCEDURE — 6370000000 HC RX 637 (ALT 250 FOR IP): Performed by: FAMILY MEDICINE

## 2024-02-08 PROCEDURE — 6370000000 HC RX 637 (ALT 250 FOR IP): Performed by: HOSPITALIST

## 2024-02-08 RX ORDER — CLOPIDOGREL BISULFATE 75 MG/1
75 TABLET ORAL DAILY
Status: DISCONTINUED | OUTPATIENT
Start: 2024-02-08 | End: 2024-02-09 | Stop reason: HOSPADM

## 2024-02-08 RX ADMIN — LOSARTAN POTASSIUM 100 MG: 25 TABLET, FILM COATED ORAL at 09:35

## 2024-02-08 RX ADMIN — EZETIMIBE 10 MG: 10 TABLET ORAL at 09:35

## 2024-02-08 RX ADMIN — DOXEPIN HYDROCHLORIDE 150 MG: 25 CAPSULE ORAL at 20:23

## 2024-02-08 RX ADMIN — GABAPENTIN 300 MG: 100 CAPSULE ORAL at 14:38

## 2024-02-08 RX ADMIN — GABAPENTIN 300 MG: 100 CAPSULE ORAL at 09:35

## 2024-02-08 RX ADMIN — PANTOPRAZOLE SODIUM 40 MG: 40 TABLET, DELAYED RELEASE ORAL at 20:23

## 2024-02-08 RX ADMIN — DILTIAZEM HYDROCHLORIDE 240 MG: 240 CAPSULE, COATED, EXTENDED RELEASE ORAL at 09:35

## 2024-02-08 RX ADMIN — OXCARBAZEPINE 900 MG: 150 TABLET, FILM COATED ORAL at 20:22

## 2024-02-08 RX ADMIN — ASPIRIN 81 MG: 81 TABLET, COATED ORAL at 09:36

## 2024-02-08 RX ADMIN — GABAPENTIN 300 MG: 100 CAPSULE ORAL at 20:23

## 2024-02-08 RX ADMIN — OXCARBAZEPINE 300 MG: 150 TABLET, FILM COATED ORAL at 09:36

## 2024-02-08 RX ADMIN — Medication 1 TABLET: at 09:36

## 2024-02-08 RX ADMIN — CLOPIDOGREL BISULFATE 75 MG: 75 TABLET ORAL at 12:23

## 2024-02-08 RX ADMIN — Medication 100 MCG: at 09:35

## 2024-02-08 RX ADMIN — ACETAMINOPHEN 650 MG: 325 TABLET ORAL at 20:22

## 2024-02-08 RX ADMIN — PANTOPRAZOLE SODIUM 40 MG: 40 TABLET, DELAYED RELEASE ORAL at 09:35

## 2024-02-08 ASSESSMENT — PAIN DESCRIPTION - LOCATION: LOCATION: FOOT

## 2024-02-08 ASSESSMENT — PAIN DESCRIPTION - ORIENTATION: ORIENTATION: RIGHT

## 2024-02-08 ASSESSMENT — PAIN DESCRIPTION - DESCRIPTORS: DESCRIPTORS: ACHING;DISCOMFORT

## 2024-02-08 ASSESSMENT — PAIN SCALES - GENERAL
PAINLEVEL_OUTOF10: 6
PAINLEVEL_OUTOF10: 0

## 2024-02-08 NOTE — FLOWSHEET NOTE
02/08/24 1201   Vital Signs   Temp 98.1 °F (36.7 °C)   Temp Source Oral   Pulse 77   Heart Rate Source Monitor   Respirations 18   BP (!) 167/55   MAP (Calculated) 92   BP Location Right upper arm   BP Method Automatic   Patient Position Supine   Orthostatic B/P and Pulse? Yes   Blood Pressure Sitting 171/59   Pulse Sitting 76 PER MINUTE   Blood Pressure Standing 155/56   Pulse Standing 88 PER MINUTE   Oxygen Therapy   SpO2 100 %   O2 Device None (Room air)

## 2024-02-08 NOTE — PROGRESS NOTES
[facility]                                                                                          Hospitalist Progress Note  Amber Mccarthy MD  Office Phone: [phone]        Date of Service:  2024  NAME:  Vance Bee  :  1945  MRN:  [id number]       Admission Summary:   Vance Bee is a 78 y.o. female who presents with altered mental status.  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, 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, 2 mm aneurysm right carotid artery, 2 mm aneurysm of PCA, review medical record shows patient was discharged from our hospital 2023 8/3/2023, she was admitted for acute dyspnea on MCA infarct, brain aneurysm, bipolar disorder, insulin-dependent diabetes mellitus with neuropathy, hypertension, paroxysmal atrial fibrillation, chronic hyponatremia, on discharge patient was on aspirin 81 mg, Plavix, vitamin B12, Cardizem 240, doxepin, Zetia, gabapentin, glipizide, metformin, Trileptal, Ozempic, and he arrived in the emergency room emergency room physician seen and evaluated, review patient not indicated prior history of stroke involving some residual weakness or numbness in right upper extremity and right lower extremity, she had a fall 1 week ago for discharge.  Provoked pulling her right foot, orthopedic surgery  seen, no evaluation of head injury large bruise on the forehead, sent for MRI, was shaking, she had difficulty getting her thought out, has been seen by Dr. Henson, order placed for neurology as well as EEG, EKG today shows sinus bradycardia no acute finding        Interval history / Subjective:   Evaluated patient in the a.m. and the stopped by this afternoon as well to update her daughter at the bedside.  She has no complaints, denied being dizzy or lightheaded.  Patient and her daughter tells me that patient has longstanding iron deficiency anemia.  She she was supposed to be on iron pills however limited by side effects, GI upset, constipation  Stool tested negative for blood.     Assessment & Plan:      Patient 79-year-old female admitted for altered mental status  1.  Altered mental status: Resolved multifactorial, due to combination of seizure/metabolic encephalopathy/anemia/hypothermia  2.  Seizure: MRI shows papilledema,?  Increased ICP, neurology following  -- Started on AED, oxcarbazepine.    3.  Acute on chronic iron deficiency anemia: Stool occult negative.  -- Low iron store  --Received a unit of PRBC on 2/6, Hb stable  --She is unable to tolerate oral iron thus may need intermittent IV iron.  --Patient is not interested in GI evaluation, endoscopy procedures.  --Will ask hematology to see her, she will need to establish for further workup and arrangement for outpatient iron infusion      4.  History of stroke: Hold anticoagulation and antiplatelet  5.  Carotid stenosis: Defer to neurologist  6.  CAROYLN aneurysm: Further imaging and follow-up per [facility]  7.  Diabetes mellitus: Sliding scale insulin  8.  Hyperlipidemia: Resume home meds  9.  Mood disorder: Resume home meds  10.  Coronary artery disease: No chest pain, EKG no MI  11.  Paroxysmal A-fib: Sinus bradycardia, on Cardizem.  Episode of junctional bradycardia/sinus bradycardia detected on monitor.  Cardiology following, recommended no further cardiac  coordination discussions were held with appropriate clinical/nonclinical/ nursing providers based on care coordination needs.         Patients current active Medications were reviewed, considered, added and adjusted based on the clinical condition today.      Home Medications were reconciled to the best of my ability given all available resources at the time of admission. Route is PO if not otherwise noted.      Admission Status:[id number]:::1}      Medications Reviewed:     Current Facility-Administered Medications   Medication Dose Route Frequency    clopidogrel (PLAVIX) tablet 75 mg  75 mg Oral Daily    OXcarbazepine (TRILEPTAL) tablet 300 mg  300 mg Oral QAM    [START ON 2/14/2024] OXcarbazepine (TRILEPTAL) tablet 600 mg  600 mg Oral QAM    aspirin EC tablet 81 mg  81 mg Oral Daily    vitamin B-12 (CYANOCOBALAMIN) tablet 100 mcg  100 mcg Oral Daily    dilTIAZem (CARDIZEM CD) extended release capsule 240 mg  240 mg Oral Daily    doxepin (SINEQUAN) capsule 150 mg  150 mg Oral QHS    ezetimibe (ZETIA) tablet 10 mg  10 mg Oral Daily    gabapentin (NEURONTIN) capsule 300 mg  300 mg Oral TID    losartan (COZAAR) tablet 100 mg  100 mg Oral Daily    OXcarbazepine (TRILEPTAL) tablet 900 mg  900 mg Oral Nightly    pantoprazole (PROTONIX) tablet 40 mg  40 mg Oral BID    glucose chewable tablet 16 g  4 tablet Oral PRN    dextrose bolus 10% 125 mL  125 mL IntraVENous PRN    Or    dextrose bolus 10% 250 mL  250 mL IntraVENous PRN    glucagon injection 1 mg  1 mg SubCUTAneous PRN    dextrose 10 % infusion   IntraVENous Continuous PRN    insulin lispro (HUMALOG) injection vial 0-8 Units  0-8 Units SubCUTAneous TID WC    insulin lispro (HUMALOG) injection vial 0-4 Units  0-4 Units SubCUTAneous Nightly    oyster shell calcium w/D 500-5 MG-MCG tablet 1 tablet  1 tablet Oral Daily    acetaminophen (TYLENOL) tablet 650 mg  650 mg Oral Q4H PRN     ______________________________________________________________________  EXPECTED LENGTH

## 2024-02-09 ENCOUNTER — TELEPHONE (OUTPATIENT)
Age: 79
End: 2024-02-09

## 2024-02-09 VITALS
DIASTOLIC BLOOD PRESSURE: 68 MMHG | HEART RATE: 71 BPM | SYSTOLIC BLOOD PRESSURE: 150 MMHG | BODY MASS INDEX: 29.61 KG/M2 | TEMPERATURE: 97.3 F | WEIGHT: 150.8 LBS | RESPIRATION RATE: 17 BRPM | OXYGEN SATURATION: 96 % | HEIGHT: 60 IN

## 2024-02-09 LAB
ANION GAP SERPL CALC-SCNC: 6 MMOL/L (ref 5–15)
BASOPHILS # BLD: 0 K/UL (ref 0–0.1)
BASOPHILS NFR BLD: 0 % (ref 0–1)
BUN SERPL-MCNC: 13 MG/DL (ref 6–20)
BUN/CREAT SERPL: 24 (ref 12–20)
CALCIUM SERPL-MCNC: 9 MG/DL (ref 8.5–10.1)
CHLORIDE SERPL-SCNC: 103 MMOL/L (ref 97–108)
CO2 SERPL-SCNC: 27 MMOL/L (ref 21–32)
CREAT SERPL-MCNC: 0.54 MG/DL (ref 0.55–1.02)
DIFFERENTIAL METHOD BLD: ABNORMAL
EOSINOPHIL # BLD: 0 K/UL (ref 0–0.4)
EOSINOPHIL NFR BLD: 0 % (ref 0–7)
ERYTHROCYTE [DISTWIDTH] IN BLOOD BY AUTOMATED COUNT: 17.6 % (ref 11.5–14.5)
FERRITIN SERPL-MCNC: 15 NG/ML (ref 26–388)
GLUCOSE BLD STRIP.AUTO-MCNC: 125 MG/DL (ref 65–117)
GLUCOSE BLD STRIP.AUTO-MCNC: 166 MG/DL (ref 65–117)
GLUCOSE SERPL-MCNC: 136 MG/DL (ref 65–100)
HCT VFR BLD AUTO: 31.4 % (ref 35–47)
HGB BLD-MCNC: 9.3 G/DL (ref 11.5–16)
IMM GRANULOCYTES # BLD AUTO: 0 K/UL (ref 0–0.04)
IMM GRANULOCYTES NFR BLD AUTO: 0 % (ref 0–0.5)
LYMPHOCYTES # BLD: 1.4 K/UL (ref 0.8–3.5)
LYMPHOCYTES NFR BLD: 20 % (ref 12–49)
MCH RBC QN AUTO: 23.2 PG (ref 26–34)
MCHC RBC AUTO-ENTMCNC: 29.6 G/DL (ref 30–36.5)
MCV RBC AUTO: 78.3 FL (ref 80–99)
MONOCYTES # BLD: 0.6 K/UL (ref 0–1)
MONOCYTES NFR BLD: 9 % (ref 5–13)
NEUTS SEG # BLD: 4.9 K/UL (ref 1.8–8)
NEUTS SEG NFR BLD: 71 % (ref 32–75)
NRBC # BLD: 0 K/UL (ref 0–0.01)
NRBC BLD-RTO: 0 PER 100 WBC
PLATELET # BLD AUTO: 336 K/UL (ref 150–400)
PMV BLD AUTO: 9.7 FL (ref 8.9–12.9)
POTASSIUM SERPL-SCNC: 4.5 MMOL/L (ref 3.5–5.1)
RBC # BLD AUTO: 4.01 M/UL (ref 3.8–5.2)
SERVICE CMNT-IMP: ABNORMAL
SERVICE CMNT-IMP: ABNORMAL
SODIUM SERPL-SCNC: 136 MMOL/L (ref 136–145)
WBC # BLD AUTO: 6.9 K/UL (ref 3.6–11)

## 2024-02-09 PROCEDURE — 6370000000 HC RX 637 (ALT 250 FOR IP): Performed by: INTERNAL MEDICINE

## 2024-02-09 PROCEDURE — 6370000000 HC RX 637 (ALT 250 FOR IP): Performed by: FAMILY MEDICINE

## 2024-02-09 PROCEDURE — 6370000000 HC RX 637 (ALT 250 FOR IP): Performed by: NURSE PRACTITIONER

## 2024-02-09 PROCEDURE — 82728 ASSAY OF FERRITIN: CPT

## 2024-02-09 PROCEDURE — 82962 GLUCOSE BLOOD TEST: CPT

## 2024-02-09 PROCEDURE — 99222 1ST HOSP IP/OBS MODERATE 55: CPT | Performed by: INTERNAL MEDICINE

## 2024-02-09 PROCEDURE — 97535 SELF CARE MNGMENT TRAINING: CPT

## 2024-02-09 PROCEDURE — 97530 THERAPEUTIC ACTIVITIES: CPT

## 2024-02-09 PROCEDURE — 36415 COLL VENOUS BLD VENIPUNCTURE: CPT

## 2024-02-09 PROCEDURE — 6360000002 HC RX W HCPCS: Performed by: HOSPITALIST

## 2024-02-09 PROCEDURE — 6370000000 HC RX 637 (ALT 250 FOR IP): Performed by: HOSPITALIST

## 2024-02-09 PROCEDURE — 85025 COMPLETE CBC W/AUTO DIFF WBC: CPT

## 2024-02-09 PROCEDURE — 80048 BASIC METABOLIC PNL TOTAL CA: CPT

## 2024-02-09 RX ORDER — OXCARBAZEPINE 300 MG/1
TABLET, FILM COATED ORAL
Qty: 64 TABLET | Refills: 0 | Status: SHIPPED | OUTPATIENT
Start: 2024-02-10 | End: 2024-03-15

## 2024-02-09 RX ORDER — FERROUS SULFATE 325(65) MG
325 TABLET ORAL
Status: DISCONTINUED | OUTPATIENT
Start: 2024-02-09 | End: 2024-02-09 | Stop reason: HOSPADM

## 2024-02-09 RX ADMIN — Medication 100 MCG: at 08:32

## 2024-02-09 RX ADMIN — ACETAMINOPHEN 650 MG: 325 TABLET ORAL at 10:48

## 2024-02-09 RX ADMIN — ASPIRIN 81 MG: 81 TABLET, COATED ORAL at 08:22

## 2024-02-09 RX ADMIN — LOSARTAN POTASSIUM 100 MG: 25 TABLET, FILM COATED ORAL at 08:22

## 2024-02-09 RX ADMIN — PANTOPRAZOLE SODIUM 40 MG: 40 TABLET, DELAYED RELEASE ORAL at 08:22

## 2024-02-09 RX ADMIN — IRON SUCROSE 200 MG: 20 INJECTION, SOLUTION INTRAVENOUS at 09:22

## 2024-02-09 RX ADMIN — DILTIAZEM HYDROCHLORIDE 240 MG: 240 CAPSULE, COATED, EXTENDED RELEASE ORAL at 08:22

## 2024-02-09 RX ADMIN — OXCARBAZEPINE 300 MG: 150 TABLET, FILM COATED ORAL at 08:22

## 2024-02-09 RX ADMIN — EZETIMIBE 10 MG: 10 TABLET ORAL at 08:23

## 2024-02-09 RX ADMIN — CLOPIDOGREL BISULFATE 75 MG: 75 TABLET ORAL at 08:23

## 2024-02-09 RX ADMIN — GABAPENTIN 300 MG: 100 CAPSULE ORAL at 14:42

## 2024-02-09 RX ADMIN — FERROUS SULFATE TAB 325 MG (65 MG ELEMENTAL FE) 325 MG: 325 (65 FE) TAB at 08:23

## 2024-02-09 RX ADMIN — Medication 1 TABLET: at 08:23

## 2024-02-09 RX ADMIN — GABAPENTIN 300 MG: 100 CAPSULE ORAL at 08:22

## 2024-02-09 ASSESSMENT — PAIN SCALES - GENERAL: PAINLEVEL_OUTOF10: 0

## 2024-02-09 NOTE — DISCHARGE INSTRUCTIONS
Discharge Instructions       PATIENT ID: Vance Bee  MRN: [id number]   YOB: 1945    DATE OF ADMISSION: 2/5/2024   DATE OF DISCHARGE: 2/9/2024    PRIMARY CARE PROVIDER: Jeana Aleman     ATTENDING PHYSICIAN: Amber Mccarthy MD   DISCHARGING PROVIDER: Amber Mccarthy MD    To contact this individual call [phone] and ask the  to page.   If unavailable ask to be transferred the Adult Hospitalist Department.    DISCHARGE DIAGNOSES   You presented with confusion.  This is felt to be due to combination of seizure, severe anemia, hypothermia. Brain imaging which was negative for stroke or other acute processes.  You were evaluated by neurologist who recommended adding oxcarbazepine to be taken 300 mg in the morning for 7 days then continue with his 600 mg daily in the morning along with your nightly dose of oxcarbazepine as you have been doing before.  Do not drive until you are seizure-free for at least 6 months.       Acute on chronic iron deficiency anemia.  No evidence of blood loss.  You have received a unit of blood and a dose of IV iron infusion.  We have consulted a hematologist who recommended outpatient iron infusion and follow-up with Dr. Aracely Vann's .     Paroxysmal A-fib: Sinus bradycardia.  There was no arrhythmia detected on your outpatient cardiac monitor.  You are seen by cardiologist who recommended outpatient follow-up with electrophysiologist cardiologist Dr. Larkin      Nondisplaced fracture of right ankle, elevation, pain control, ice, follow-up appointment Dr. Anderson in 7 days                   CONSULTATIONS: Cardiologist, hematologist, orthopedic surgery    PROCEDURES/SURGERIES: * No surgery found *    PENDING TEST RESULTS:   At the time of discharge the following test results are still pending: none    FOLLOW UP APPOINTMENTS: Call and set up follow-up appointments with:  Hematology/oncology Dr. Vann  PCP  Dr. Anderson, orthopedic surgery  Dr. Larkin, EP

## 2024-02-09 NOTE — PROGRESS NOTES
This was a follow up visit to a pt in . Pt was eating her lunch. Pt was engaged in conversation. Pt was informed by the doctor that she could be discharged that day. Pt was excited. Pt had strong support from her family. She relied on her prayer as source of her spiritual strength.  provided active listening and assured the pt of his continued prayer. There is no more need for future visits.    Chaplain Rene Pedro [phone]

## 2024-02-09 NOTE — DISCHARGE SUMMARY
Discharge Summary       PATIENT ID: Vance Bee  MRN: [id number]   YOB: 1945    DATE OF ADMISSION: 2/5/2024 12:30 PM    DATE OF DISCHARGE: 02/09/24     PRIMARY CARE PROVIDER: Jeana Aleman MD     ATTENDING PHYSICIAN: Amber Mccarthy MD    DISCHARGING PROVIDER: Amber Mccarthy MD    To contact this individual call [phone] and ask the  to page.  If unavailable ask to be transferred the Adult Hospitalist Department.    CONSULTATIONS: IP CONSULT TO NEUROLOGY  IP CONSULT TO ORTHOPEDIC SURGERY  IP CONSULT TO CARDIOLOGY  IP CONSULT HOME HEALTH  IP CONSULT TO HEMATOLOGY  IP CONSULT TO CASE MANAGEMENT    PROCEDURES/SURGERIES: * No surgery found *     ADMITTING DIAGNOSES & HOSPITAL COURSE:     Admission Summary:   Vance Bee is a 78 y.o. female who presents with altered mental status.  Patient was admitted MRI for outpatient follow-up when she began staring off, altered mental status, rhythmic or movement, no loss of consciousness, RRT called and brought to the emergency room, daughter reports symptoms ongoing since August when she had a stroke, today when he arrived, routine lab sodium 131 potassium 4.6 BUN 10 creatinine 0.64 glucose 130, hemoglobin 7.2, 7.1, hematocrit 24.3 at 24.8, last hemoglobin 10.6 12.1 in August 2023, CT scan of head without contrastLow shows no acute intracranial abnormalities, CTA of head and neck August 2023 shows moderate stenosis left common carotid, 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, 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, 2 mm aneurysm right carotid artery, 2 mm aneurysm of PCA, review medical record shows patient was discharged from our hospital 8/6/2023 8/3/2023, she was admitted for acute dyspnea on MCA infarct, brain aneurysm, bipolar disorder, insulin-dependent diabetes mellitus with neuropathy, hypertension, paroxysmal atrial fibrillation, chronic hyponatremia, on discharge patient was on aspirin 81 mg, Plavix, vitamin B12,  Cardizem 240, doxepin, Zetia, gabapentin, glipizide, metformin, Trileptal, Ozempic, and he arrived in the emergency room emergency room physician seen and evaluated, review patient not indicated prior history of stroke involving some residual weakness or numbness in right upper extremity and right lower extremity, she had a fall 1 week ago for discharge.  Provoked pulling her right foot, orthopedic surgery seen, no evaluation of head injury large bruise on the forehead, sent for MRI, was shaking, she had difficulty getting her thought out, has been seen by Dr. Henson, order placed for neurology as well as EEG, EKG today shows sinus bradycardia no acute finding        Altered mental status, resolved.  This was felt to be multifactorial including seizure, severe anemia, hypothermia.  Neurology consulted and added oxcarbazepine.  Patient advised of no driving until seizure-free for < 6 months     Acute on chronic iron deficiency anemia: Stool occult negative.  No evidence of blood loss.  Unable to tolerate oral iron due to constipation, intolerance and poor response.  She has received a unit of PRBC and a dose of IV iron.  Hematology consulted, plan is for outpatient iron infusion and follow-up.  Patient declined GI workup inpatient       Chronic conditions:  4.  History of stroke: Hold anticoagulation and antiplatelet  5.  Carotid stenosis: Defer to neurologist  6.  CAROLYN aneurysm: Further imaging and follow-up per NIS  7.  Diabetes mellitus: Sliding scale insulin  8.  Hyperlipidemia: Resume home meds  9.  Mood disorder: Resume home meds  10.  Coronary artery disease: No chest pain, EKG no MI  11.  Paroxysmal A-fib: Sinus bradycardia, on Cardizem.  Episode of junctional bradycardia/sinus bradycardia detected on monitor.  Cardiology following, recommended no further cardiac workup, recommended outpatient follow-up with cardiologist Dr. Khalil and EP cardiologist Dr. Larkin  12.  DVT prophylaxis: SCD  13.  Nondisplaced  fracture of right ankle, elevation, pain control, ice, follow-up appointment Dr. Anderson in 7 days         Disposition: Patient is discharged home with home health services in a stable condition.               PENDING TEST RESULTS:   At the time of discharge the following test results are still pending: none    FOLLOW UP APPOINTMENTS:    Follow-up Information       Follow up With Specialties Details Why Contact [facility]  Follow up [facility] [address], [place] [address] [phone]              ADDITIONAL CARE RECOMMENDATIONS:     DIET: regular diet and cardiac diet    ACTIVITY: activity as tolerated    WOUND CARE: NA    EQUIPMENT needed: own      DISCHARGE MEDICATIONS:     Medication List        CHANGE how you take these medications      * OXcarbazepine 300 MG tablet  Commonly known as: TRILEPTAL  What changed: Another medication with the same name was added. Make sure you understand how and when to take each.     * OXcarbazepine 300 MG tablet  Commonly known as: TRILEPTAL  Take 1 tablet by mouth every morning for 4 days, THEN 2 tablets every morning.  Start taking on: February 10, 2024  What changed: You were already taking a medication with the same name, and this prescription was added. Make sure you understand how and when to take each.           * This list has 2 medication(s) that are the same as other medications prescribed for you. Read the directions carefully, and ask your doctor or other care provider to review them with you.                CONTINUE taking these medications      aspirin 81 MG EC tablet  Take 1 tablet by mouth daily     Biotin 2500 MCG Caps     Calcium Carbonate-Vitamin D 600-3.125 MG-MCG Tabs     clopidogrel 75 MG tablet  Commonly known as: Plavix  Take 1 tablet by mouth daily     cyanocobalamin 100 MCG tablet     dilTIAZem 240 MG extended release capsule  Commonly known as: CARDIZEM CD     doxepin 150 MG capsule  Commonly

## 2024-02-09 NOTE — PLAN OF CARE
Problem: Occupational Therapy - Adult  Goal: By Discharge: Performs self-care activities at highest level of function for planned discharge setting.  See evaluation for individualized goals.  Description: FUNCTIONAL STATUS PRIOR TO ADMISSION: Pt reports she lives alone in two level home with a stair lift. Pt was IND with ADLs prior to admission and has a daughter nearby that assists with IADLs PRN.    HOME SUPPORT: Patient lived alone with nearby daughter to provide assistance.    Occupational Therapy Goals:  Initiated 2/6/2024  1.  Patient will perform grooming task seated with Supervision within 7 day(s).  2.  Patient will perform upper body dressing seated with Supervision within 7 day(s).  3.  Patient will perform lower body dressing seated with Stand by Assist within 7 day(s).  4.  Patient will perform toilet transfers with Stand by Assist  within 7 day(s).  5.  Patient will perform all aspects of toileting with Stand by Assist within 7 day(s).  6.  Patient will participate in upper extremity therapeutic exercise/activities with Modified [place] for 5 minutes within 7 day(s).    7.  Patient will utilize energy conservation techniques during functional activities with verbal and visual cues within 7 day(s).    Outcome: Progressing     OCCUPATIONAL THERAPY TREATMENT  Patient: Vance Bee (78 y.o. female)  Date: 2/9/2024  Primary Diagnosis: Altered mental status, unspecified altered mental status type [R41.82]  AMS (altered mental status) [R41.82]       Precautions: Fall Risk (RLE NWB; +OH)                Chart, occupational therapy assessment, plan of care, and goals were reviewed.    ASSESSMENT  Patient continues to benefit from skilled OT services and is progressing towards goals. Pt received to OT services semi-reclined in bed, amendable to session. Pt with intermittent reports of dizziness, however, VSS throughout session. Pt required SPV for all bed mobility and up to CGA with RW for functional  mobility for dynamic standing balance and max VCs for adherence to NWB in RLE and overall technique. Pt donned L sock at bed level with SBA then required increased assistance to don R CAM Walker boot with up to MIN A for distal LE access and strap adjustment while seated EOB with R foot propped up on chair. Pt ambulated short distance in room (see PT note for gait details) to bedside chair. Provided verbal education on ADL adaptive strategies, energy conservation technique, and overall safety/insight following discharge home. OT recommends HH OT with increased assistance from daughter for ADLs and related functional mobility.      PLAN :  Patient continues to benefit from skilled intervention to address the above impairments.  Continue treatment per established plan of care to address goals.    Recommend with staff: OOB to chair 3x/day for meals, toileting via BSC    Recommend next OT session: practice BSC transfers    Recommendation for discharge: (in order for the patient to meet his/her long term goals): Therapy 2 days/week in the home and also see \"other factors to consider\" below for additional discharge concerns/needs    Other factors to consider for discharge: poor safety awareness, high risk for falls, not safe to be alone, concern for safely navigating or managing the home environment, and new weight bearing restrictions limiting activity or patient is unable to maintain    IF patient discharges home will need the following DME: bedside commode, shower chair, rolling walker, and continuing to assess with progress       SUBJECTIVE:   Patient stated “I just want to get the heck out of here.”    OBJECTIVE DATA SUMMARY:   Cognitive/Behavioral Status:  Orientation  Overall Orientation Status: Within Normal Limits  Orientation Level: Oriented X4  Cognition  Overall Cognitive Status: Exceptions  Arousal/Alertness: Appropriate responses to stimuli  Following Commands: Follows all commands without

## 2024-02-09 NOTE — PROGRESS NOTES
Pt. Has orders for a stool sample, but she hasn't had another BM. Will pass in report sample still needs to be collected.

## 2024-02-09 NOTE — PLAN OF CARE
Problem: Physical Therapy - Adult  Goal: By Discharge: Performs mobility at highest level of function for planned discharge setting.  See evaluation for individualized goals.  Description: FUNCTIONAL STATUS PRIOR TO ADMISSION: Pt with recent RLE fx and has been limited to transfers to/from her W/C with Cam boot donned, NWB. +non-mechanical fall hx.    HOME SUPPORT PRIOR TO ADMISSION: The patient lived alone with local family to provide assistance for driving/grocery needs.    Physical Therapy Goals  Initiated 2/6/2024  1.  Patient will move from supine to sit and sit to supine in bed with supervision/set-up within 7 day(s).    2.  Patient will perform sit to stand with maxA assistance at RW with RLE NWB (boot) within 7 day(s).  3.  Patient will transfer from bed to chair and chair to bed with maximal assistance using the least restrictive device within 7 day(s).  4.  Patient will demonstrate seated EOB tolerance x8 minutes with supervision within 7 day(s).   5. Patient will demonstrate supine therex for BP elevation (x10 reps) prior to bed mobility with supervision/verbal cues only within 7 days.      Outcome: Progressing     PHYSICAL THERAPY TREATMENT    Patient: Vance Bee (78 y.o. female)  Date: 2/9/2024  Diagnosis: Altered mental status, unspecified altered mental status type [R41.82]  AMS (altered mental status) [R41.82] AMS (altered mental status)      Precautions: Fall Risk (RLE NWB; +OH)               Required Braces or Orthoses  Right Lower Extremity Brace: Boot  RLE Brace Type: Cam Walker      ASSESSMENT:  Patient continues to benefit from skilled PT services and is slowly progressing towards goals. Pt tolerates supine to sit at the EOB from HOB elevated position. Pt performs donning boot with foot up on chair to avoid bending forward and becoming dizzy. Pt able to stand however unable to maintain NWB in boot, with pt resting foot on the floor in standing and denies weight bearing through foot. Pt  Intact  Sitting - Static: Good (unsupported)  Sitting - Dynamic: Good (unsupported)  Standing: Impaired  Standing - Static: Constant support;Good  Standing - Dynamic: Constant support;Fair   Ambulation/Gait Training:     Gait  Gait Training: Yes  Overall Level of Assistance: Assist X1;Contact-guard assistance  Distance (ft): 2 Feet  Assistive Device: Gait belt  Interventions: Safety awareness training;Verbal cues;Tactile cues;Visual cues  Neuro Re-Education:                    Pain Rating:   patient medicated for pain prior to session    Activity Tolerance:   Fair  and requires frequent rest breaks    After treatment:   Patient left in no apparent distress sitting up in chair and Call bell within reach      COMMUNICATION/EDUCATION:   The patient's plan of care was discussed with: registered nurse and            Lynda Delgado, PT  Minutes: 25

## 2024-02-09 NOTE — TELEPHONE ENCOUNTER
Called spoke with daughter Kelsea regarding veno appt dates and times and also regarding opic/ov in April. Confirmed appts. Advised will be also updated in "[employer]hart and can c/b # if any updates need to happen.

## 2024-02-09 NOTE — CONSULTS
[facility]  [address], [address] [facility] [address]  W: [phone]  F: [phone]    Reason for Evaluation:   Vance Bee is a 78 y.o. female who is seen in consultation at the request of Dr. Mccarthy for evaluation of chronic iron deficiency anemia.    History of Present Illness:   Vance Bee is a pleasant 78 y.o. female who presents today for evaluation of chronic iron deficiency anemia.    Patient brought in for altered mental status and seizure. Labs on arrival notable for hemoglobin 7.2 (from 10.6 in August 2023). CT head negative. Neurology is following and has started on anti-epileptics. Also with non-displaced fracture in right ankle. Of note, patient was admitted in 8/2023 for acute CVA.     She has received 1 unit PRBC this admission and hemoglobin improved to 9. Work up of anemia notable for very low iron sat 6%, low iron, and high-normal TIBC. Ferritin not sent. B12 and folate wnl. She is on aspirin and plavix at home given history of stroke.    She has a history of INGRID in 9/2020, requiring inpatient admission. She was seen by GI but endoscopic evaluation never pursued. She denies melena, hematochezia. FOBT negative.    Social History:  Lives alone. Daughter drives her to appointments.    Review of systems was obtained and pertinent findings reviewed above. Past medical history, social history, family history, medications, and allergies are located in the electronic medical record.    Physical Exam:   BP (!) 162/54   Pulse 62   Temp 98.1 °F (36.7 °C) (Oral)   Resp 16   Ht 1.524 m (5')   Wt 68.4 kg (150 lb 12.8 oz)   SpO2 98%   BMI 29.45 kg/m²   General: no distress  Eyes: anicteric sclerae  HENT: oropharynx clear  Neck: supple  Lymphatic: no cervical, supraclavicular, or inguinal adenopathy  Respiratory: normal respiratory effort  CV: no peripheral edema  Ext: warm, well perfused, no edema  GI: soft, nontender, nondistended, no masses  Skin: no rashes, no

## 2024-02-12 ENCOUNTER — TELEPHONE (OUTPATIENT)
Age: 79
End: 2024-02-12

## 2024-02-12 DIAGNOSIS — D50.8 OTHER IRON DEFICIENCY ANEMIA: Primary | ICD-10-CM

## 2024-02-12 PROBLEM — D50.9 IRON DEFICIENCY ANEMIA: Status: ACTIVE | Noted: 2024-02-12

## 2024-02-12 RX ORDER — EPINEPHRINE 1 MG/ML
0.3 INJECTION, SOLUTION, CONCENTRATE INTRAVENOUS PRN
OUTPATIENT
Start: 2024-02-20

## 2024-02-12 RX ORDER — DIPHENHYDRAMINE HYDROCHLORIDE 50 MG/ML
50 INJECTION INTRAMUSCULAR; INTRAVENOUS
OUTPATIENT
Start: 2024-02-20

## 2024-02-12 RX ORDER — HEPARIN 100 UNIT/ML
500 SYRINGE INTRAVENOUS PRN
OUTPATIENT
Start: 2024-02-20

## 2024-02-12 RX ORDER — ALBUTEROL SULFATE 90 UG/1
4 AEROSOL, METERED RESPIRATORY (INHALATION) PRN
OUTPATIENT
Start: 2024-02-20

## 2024-02-12 RX ORDER — SODIUM CHLORIDE 9 MG/ML
5-250 INJECTION, SOLUTION INTRAVENOUS PRN
OUTPATIENT
Start: 2024-02-20

## 2024-02-12 RX ORDER — ONDANSETRON 2 MG/ML
8 INJECTION INTRAMUSCULAR; INTRAVENOUS
OUTPATIENT
Start: 2024-02-20

## 2024-02-12 RX ORDER — ACETAMINOPHEN 325 MG/1
650 TABLET ORAL
OUTPATIENT
Start: 2024-02-20

## 2024-02-12 RX ORDER — SODIUM CHLORIDE 0.9 % (FLUSH) 0.9 %
5-40 SYRINGE (ML) INJECTION PRN
OUTPATIENT
Start: 2024-02-20

## 2024-02-12 RX ORDER — SODIUM CHLORIDE 9 MG/ML
INJECTION, SOLUTION INTRAVENOUS CONTINUOUS
OUTPATIENT
Start: 2024-02-20

## 2024-02-12 NOTE — TELEPHONE ENCOUNTER
Pt. Had a hospital stay, they took off her heart monitor, wants to know if she can put it back on.     Pt. Phone - [phone]

## 2024-02-12 NOTE — TELEPHONE ENCOUNTER
Called patient advised her insurance company approves a different IV iron  than originally planned so I cancelled her last venofer appt. And changed to 2 doses of injectafer. Patient confirmed. Understands will return in April with labs/ov

## 2024-02-12 NOTE — TELEPHONE ENCOUNTER
Called pt. Verified patient's identity with two identifiers. I told her she can can put the monitor back on for now. Emra said her monitor end date is February 22nd and she can . I told pt she may not need to wear it until then, but I would discuss with Dr. Khalil tomorrow and call her back. Pt is not going to put it on until after her lung CT scheduled for tomorrow. I told her that is fine.    Dr. Khalil-  Dr. Larkin sent us staff messages about monitor and her hospital stay. Should she continue wearing heart monitor or return it? Her f/u with you is scheduled for March. Do you want to see her sooner?

## 2024-02-13 ENCOUNTER — HOSPITAL ENCOUNTER (OUTPATIENT)
Facility: HOSPITAL | Age: 79
Discharge: HOME | End: 2024-02-16
Attending: INTERNAL MEDICINE
Payer: MEDICARE

## 2024-02-13 ENCOUNTER — TELEPHONE (OUTPATIENT)
Age: 79
End: 2024-02-13

## 2024-02-13 DIAGNOSIS — R06.02 SHORTNESS OF BREATH: ICD-10-CM

## 2024-02-13 PROCEDURE — 6360000004 HC RX CONTRAST MEDICATION: Performed by: INTERNAL MEDICINE

## 2024-02-13 PROCEDURE — 71275 CT ANGIOGRAPHY CHEST: CPT

## 2024-02-13 RX ADMIN — IOPAMIDOL 100 ML: 755 INJECTION, SOLUTION INTRAVENOUS at 12:08

## 2024-02-13 NOTE — TELEPHONE ENCOUNTER
Pt called to scheduled a New Pt apt with Dr. Larkin from her recent hospital visit. Pt was offered 07/25/2024 apt, pt stated she would need a sooner apt. Pt would like a callback to schedule apt.       Pt ph# [phone]

## 2024-02-14 ENCOUNTER — TELEPHONE (OUTPATIENT)
Age: 79
End: 2024-02-14

## 2024-02-14 NOTE — TELEPHONE ENCOUNTER
Pt was inpt @ [facility] 2/5-2/9, she needs an appt w/dr prado per discharge dr. Gonzalez # [phone]

## 2024-02-14 NOTE — PROGRESS NOTES
Physician Progress Note      PATIENT:               EBONIE SHARMA  CSN #:                  [id number]  :                       1945  ADMIT DATE:       2024 12:30 PM  DISCH DATE:        2024 3:11 PM  RESPONDING  PROVIDER #:        Amber Mccarthy MD          QUERY TEXT:    Enjoin query:    Patient was admitted with seizure and has a history of stroke. Imaging showed   atherosclerotic plaque.  Patient was treated with oxcarbazepine.  If possible,   please document in progress notes and discharge summary after further review,   can the etiology of the seizure be clarified as:    The medical record reflects the following:  Risk Factors: Seizure, pmhx CVA    Clinical Indicators: AMS    Treatment: oxcarbazepine  Neurology consulted  Options provided:  -- Seizure as a sequela of prior CVA  -- Other - I will add my own diagnosis  -- Disagree - Not applicable / Not valid  -- Disagree - Clinically unable to determine / Unknown  -- Refer to Clinical Documentation Reviewer    PROVIDER RESPONSE TEXT:    Seizure is a sequela of prior CVA.    Query created by: Rosemary Diop on 2024 5:27 AM      Electronically signed by:  Amber Mccarthy MD 2024 4:39 PM

## 2024-02-19 ENCOUNTER — TELEPHONE (OUTPATIENT)
Age: 79
End: 2024-02-19

## 2024-02-19 NOTE — TELEPHONE ENCOUNTER
Verified patient with two types of identifiers.  Spoke with daughter Kelsea, verified with PHI.  Scheduled appointment for 4/16 at 940 am.  Location confirmed.  Daughter aware patient has chemo scheduled that day.  She will call to see if that appointment can be moved.    Future Appointments   Date Time Provider Department Center   2/20/2024 11:30 AM H2 SHELLY FASTRACK RCHIC[facility]   2/26/2024  1:00 PM Alexa Goldman, APRN - NP NIS BS [facility]   2/27/2024 11:30 AM G2 SHELLY FASTRACK [facility]   3/13/2024 11:00 AM Lazaro Khalil MD CAV BS [facility]   3/18/2024  1:30 PM Alisa Diana, APRN - CNP NEUMRSPB [facility]   3/27/2024  1:30 PM Nicholas Ramos, APRN - NP NEUSM BS [facility]   4/16/2024  9:40 AM Narinder Larkin MD [facility]   4/16/2024 10:30 AM H3 SHELLY LAB [facility]   4/16/2024 10:45 AM Amirah Vann MD [facility]           ----- Message from Narinder Larkin MD sent at 2/18/2024  4:48 PM EST -----  She is discharged home  Will likely need dual chamber pacemaker for sick sinus and PAF and later watchman device  She had anemia and mental status change so did not do while at [facility]  She sees Dr Khalil in march   Pls make appt with me in April but save a spot for pacer in May ok

## 2024-02-20 ENCOUNTER — HOSPITAL ENCOUNTER (OUTPATIENT)
Facility: HOSPITAL | Age: 79
Setting detail: INFUSION SERIES
Discharge: HOME | End: 2024-02-20

## 2024-02-20 VITALS — HEART RATE: 78 BPM | DIASTOLIC BLOOD PRESSURE: 58 MMHG | SYSTOLIC BLOOD PRESSURE: 187 MMHG | TEMPERATURE: 97.6 F

## 2024-02-20 ASSESSMENT — PAIN SCALES - GENERAL: PAINLEVEL_OUTOF10: 6

## 2024-02-20 ASSESSMENT — PAIN DESCRIPTION - LOCATION: LOCATION: FOOT

## 2024-02-20 ASSESSMENT — PAIN DESCRIPTION - ORIENTATION: ORIENTATION: RIGHT

## 2024-02-20 NOTE — PROGRESS NOTES
\[facility]\"" Short Note                       Date: 2024    Name: Vance Bee    MRN: [id number]         : 1945      Pt admit to \[facility]\"" for Injectafer ambulatory in stable condition. Assessment completed and documented in flowsheets. No acute concerns at this time.  Please review pending lab results in CC.      Ms. Bee's vitals were reviewed prior to and after treatment.   Patient Vitals for the past 12 hrs:   Temp Pulse BP   24 1215 -- -- (!) 187/58   24 1130 -- -- (!) 192/66   24 1115 97.6 °F (36.4 °C) 78 (!) 188/71     PIV placed in R AC.     Patient blood pressure outside of treatment parameters. Team notified, advised to wait 15-20 minutes and retake. Blood pressure still elevated at retake, treatment held for today.     Patient stated she has been having issues with her BP and has been getting sick when she believes her BP drops. Her PCP is aware. Patient stated she was unable to take BP medication this morning due to being sick. Patient advised to bring medication with her to her next appointment if necessary.      PIV positive blood return noted, flushed and removed prior to discharge.      [facility] and is aware of future appointments.     Future Appointments   Date Time Provider Department Center   2024  1:00 PM Alexa Goldman, APRN - NP CLARISSA [facility]   2024 11:30 AM G2 SHELLY FASTRACK [facility]   3/5/2024  3:00 PM H2 SHELLY FASTRACK [facility]   3/13/2024 11:00 AM Lazaro Khalil MD CAV [facility]   3/18/2024  1:30 PM Alisa Diana, APRN - CNP NEUMRSPB BS [facility]   3/27/2024  1:30 PM Nicholas Ramos APRN - NP JOVAN BS [facility]   2024  9:40 AM Narinder Larkin MD CAVREY BS [facility]   2024 10:30 AM H3 SHELLY LAB [facility]   2024 10:45 AM Amirah Vann MD [facility] BS [facility]       OUSMANE DOMINGUEZ RN  2024  12:41 PM

## 2024-02-21 ENCOUNTER — HOSPITAL ENCOUNTER (EMERGENCY)
Facility: HOSPITAL | Age: 79
Discharge: HOME | End: 2024-02-21
Attending: EMERGENCY MEDICINE
Payer: MEDICARE

## 2024-02-21 ENCOUNTER — APPOINTMENT (OUTPATIENT)
Facility: HOSPITAL | Age: 79
End: 2024-02-21
Payer: MEDICARE

## 2024-02-21 VITALS
RESPIRATION RATE: 18 BRPM | BODY MASS INDEX: 28 KG/M2 | HEART RATE: 106 BPM | HEIGHT: 60 IN | SYSTOLIC BLOOD PRESSURE: 167 MMHG | DIASTOLIC BLOOD PRESSURE: 74 MMHG | WEIGHT: 142.64 LBS | OXYGEN SATURATION: 97 % | TEMPERATURE: 97 F

## 2024-02-21 DIAGNOSIS — R11.2 NAUSEA AND VOMITING, UNSPECIFIED VOMITING TYPE: Primary | ICD-10-CM

## 2024-02-21 LAB
ALBUMIN SERPL-MCNC: 4.1 G/DL (ref 3.5–5)
ALBUMIN/GLOB SERPL: 1.1 (ref 1.1–2.2)
ALP SERPL-CCNC: 168 U/L (ref 45–117)
ALT SERPL-CCNC: 16 U/L (ref 12–78)
ANION GAP SERPL CALC-SCNC: 17 MMOL/L (ref 5–15)
APPEARANCE UR: CLEAR
AST SERPL-CCNC: 19 U/L (ref 15–37)
BACTERIA URNS QL MICRO: NEGATIVE /HPF
BASOPHILS # BLD: 0 K/UL (ref 0–0.1)
BASOPHILS NFR BLD: 0 % (ref 0–1)
BILIRUB SERPL-MCNC: 0.5 MG/DL (ref 0.2–1)
BILIRUB UR QL: NEGATIVE
BUN SERPL-MCNC: 10 MG/DL (ref 6–20)
BUN/CREAT SERPL: 18 (ref 12–20)
CALCIUM SERPL-MCNC: 9.9 MG/DL (ref 8.5–10.1)
CHLORIDE SERPL-SCNC: 90 MMOL/L (ref 97–108)
CO2 SERPL-SCNC: 23 MMOL/L (ref 21–32)
COLOR UR: ABNORMAL
CREAT SERPL-MCNC: 0.55 MG/DL (ref 0.55–1.02)
DIFFERENTIAL METHOD BLD: ABNORMAL
EKG ATRIAL RATE: 112 BPM
EKG DIAGNOSIS: NORMAL
EKG P AXIS: 36 DEGREES
EKG P-R INTERVAL: 162 MS
EKG Q-T INTERVAL: 342 MS
EKG QRS DURATION: 68 MS
EKG QTC CALCULATION (BAZETT): 466 MS
EKG R AXIS: -11 DEGREES
EKG T AXIS: 85 DEGREES
EKG VENTRICULAR RATE: 112 BPM
EOSINOPHIL # BLD: 0 K/UL (ref 0–0.4)
EOSINOPHIL NFR BLD: 0 % (ref 0–7)
EPITH CASTS URNS QL MICRO: ABNORMAL /LPF
ERYTHROCYTE [DISTWIDTH] IN BLOOD BY AUTOMATED COUNT: 21.2 % (ref 11.5–14.5)
FLUAV AG NPH QL IA: NEGATIVE
FLUBV AG NOSE QL IA: NEGATIVE
GLOBULIN SER CALC-MCNC: 3.6 G/DL (ref 2–4)
GLUCOSE SERPL-MCNC: 160 MG/DL (ref 65–100)
GLUCOSE UR STRIP.AUTO-MCNC: NEGATIVE MG/DL
HCT VFR BLD AUTO: 37.9 % (ref 35–47)
HGB BLD-MCNC: 11.8 G/DL (ref 11.5–16)
HGB UR QL STRIP: NEGATIVE
IMM GRANULOCYTES # BLD AUTO: 0.1 K/UL (ref 0–0.04)
IMM GRANULOCYTES NFR BLD AUTO: 1 % (ref 0–0.5)
KETONES UR QL STRIP.AUTO: 40 MG/DL
LEUKOCYTE ESTERASE UR QL STRIP.AUTO: NEGATIVE
LIPASE SERPL-CCNC: 22 U/L (ref 13–75)
LYMPHOCYTES # BLD: 1.7 K/UL (ref 0.8–3.5)
LYMPHOCYTES NFR BLD: 20 % (ref 12–49)
MCH RBC QN AUTO: 24 PG (ref 26–34)
MCHC RBC AUTO-ENTMCNC: 31.1 G/DL (ref 30–36.5)
MCV RBC AUTO: 77 FL (ref 80–99)
MONOCYTES # BLD: 0.4 K/UL (ref 0–1)
MONOCYTES NFR BLD: 5 % (ref 5–13)
NEUTS SEG # BLD: 6.5 K/UL (ref 1.8–8)
NEUTS SEG NFR BLD: 74 % (ref 32–75)
NITRITE UR QL STRIP.AUTO: NEGATIVE
NRBC # BLD: 0 K/UL (ref 0–0.01)
NRBC BLD-RTO: 0 PER 100 WBC
PH UR STRIP: 7.5 (ref 5–8)
PLATELET # BLD AUTO: 417 K/UL (ref 150–400)
PMV BLD AUTO: 10.3 FL (ref 8.9–12.9)
POTASSIUM SERPL-SCNC: 4 MMOL/L (ref 3.5–5.1)
PROT SERPL-MCNC: 7.7 G/DL (ref 6.4–8.2)
PROT UR STRIP-MCNC: 30 MG/DL
RBC # BLD AUTO: 4.92 M/UL (ref 3.8–5.2)
RBC #/AREA URNS HPF: ABNORMAL /HPF (ref 0–5)
RBC MORPH BLD: ABNORMAL
SARS-COV-2 RDRP RESP QL NAA+PROBE: NOT DETECTED
SODIUM SERPL-SCNC: 130 MMOL/L (ref 136–145)
SOURCE: NORMAL
SP GR UR REFRACTOMETRY: 1.01 (ref 1–1.03)
TROPONIN I SERPL HS-MCNC: 10 NG/L (ref 0–51)
UROBILINOGEN UR QL STRIP.AUTO: 0.2 EU/DL (ref 0.2–1)
WBC # BLD AUTO: 8.7 K/UL (ref 3.6–11)
WBC URNS QL MICRO: ABNORMAL /HPF (ref 0–4)

## 2024-02-21 PROCEDURE — 6360000002 HC RX W HCPCS: Performed by: EMERGENCY MEDICINE

## 2024-02-21 PROCEDURE — 70450 CT HEAD/BRAIN W/O DYE: CPT

## 2024-02-21 PROCEDURE — 80053 COMPREHEN METABOLIC PANEL: CPT

## 2024-02-21 PROCEDURE — 36415 COLL VENOUS BLD VENIPUNCTURE: CPT

## 2024-02-21 PROCEDURE — 87804 INFLUENZA ASSAY W/OPTIC: CPT

## 2024-02-21 PROCEDURE — 2580000003 HC RX 258: Performed by: EMERGENCY MEDICINE

## 2024-02-21 PROCEDURE — 93005 ELECTROCARDIOGRAM TRACING: CPT | Performed by: EMERGENCY MEDICINE

## 2024-02-21 PROCEDURE — 84484 ASSAY OF TROPONIN QUANT: CPT

## 2024-02-21 PROCEDURE — 96375 TX/PRO/DX INJ NEW DRUG ADDON: CPT

## 2024-02-21 PROCEDURE — 96361 HYDRATE IV INFUSION ADD-ON: CPT

## 2024-02-21 PROCEDURE — 81001 URINALYSIS AUTO W/SCOPE: CPT

## 2024-02-21 PROCEDURE — 99285 EMERGENCY DEPT VISIT HI MDM: CPT

## 2024-02-21 PROCEDURE — 96374 THER/PROPH/DIAG INJ IV PUSH: CPT

## 2024-02-21 PROCEDURE — 87635 SARS-COV-2 COVID-19 AMP PRB: CPT

## 2024-02-21 PROCEDURE — 6360000004 HC RX CONTRAST MEDICATION: Performed by: EMERGENCY MEDICINE

## 2024-02-21 PROCEDURE — 93010 ELECTROCARDIOGRAM REPORT: CPT | Performed by: SPECIALIST

## 2024-02-21 PROCEDURE — 74177 CT ABD & PELVIS W/CONTRAST: CPT

## 2024-02-21 PROCEDURE — 85025 COMPLETE CBC W/AUTO DIFF WBC: CPT

## 2024-02-21 PROCEDURE — 83690 ASSAY OF LIPASE: CPT

## 2024-02-21 RX ORDER — ONDANSETRON 4 MG/1
4 TABLET, ORALLY DISINTEGRATING ORAL 3 TIMES DAILY PRN
Qty: 21 TABLET | Refills: 0 | Status: SHIPPED | OUTPATIENT
Start: 2024-02-21

## 2024-02-21 RX ORDER — 0.9 % SODIUM CHLORIDE 0.9 %
1000 INTRAVENOUS SOLUTION INTRAVENOUS ONCE
Status: COMPLETED | OUTPATIENT
Start: 2024-02-21 | End: 2024-02-21

## 2024-02-21 RX ORDER — ONDANSETRON 2 MG/ML
4 INJECTION INTRAMUSCULAR; INTRAVENOUS
Status: COMPLETED | OUTPATIENT
Start: 2024-02-21 | End: 2024-02-21

## 2024-02-21 RX ORDER — PROCHLORPERAZINE EDISYLATE 5 MG/ML
10 INJECTION INTRAMUSCULAR; INTRAVENOUS ONCE
Status: COMPLETED | OUTPATIENT
Start: 2024-02-21 | End: 2024-02-21

## 2024-02-21 RX ORDER — SODIUM CHLORIDE, SODIUM LACTATE, POTASSIUM CHLORIDE, AND CALCIUM CHLORIDE .6; .31; .03; .02 G/100ML; G/100ML; G/100ML; G/100ML
1000 INJECTION, SOLUTION INTRAVENOUS ONCE
Status: COMPLETED | OUTPATIENT
Start: 2024-02-21 | End: 2024-02-21

## 2024-02-21 RX ADMIN — ONDANSETRON 4 MG: 2 INJECTION INTRAMUSCULAR; INTRAVENOUS at 10:31

## 2024-02-21 RX ADMIN — IOPAMIDOL 100 ML: 755 INJECTION, SOLUTION INTRAVENOUS at 10:12

## 2024-02-21 RX ADMIN — SODIUM CHLORIDE 1000 ML: 9 INJECTION, SOLUTION INTRAVENOUS at 11:37

## 2024-02-21 RX ADMIN — SODIUM CHLORIDE, POTASSIUM CHLORIDE, SODIUM LACTATE AND CALCIUM CHLORIDE 1000 ML: 600; 310; 30; 20 INJECTION, SOLUTION INTRAVENOUS at 08:56

## 2024-02-21 RX ADMIN — PROCHLORPERAZINE EDISYLATE 10 MG: 5 INJECTION INTRAMUSCULAR; INTRAVENOUS at 08:57

## 2024-02-21 ASSESSMENT — ENCOUNTER SYMPTOMS
VOMITING: 1
BLOOD IN STOOL: 0
DIARRHEA: 0
SHORTNESS OF BREATH: 0
COUGH: 0
ABDOMINAL DISTENTION: 0
NAUSEA: 1
ANAL BLEEDING: 0
ABDOMINAL PAIN: 0

## 2024-02-21 ASSESSMENT — PAIN - FUNCTIONAL ASSESSMENT: PAIN_FUNCTIONAL_ASSESSMENT: NONE - DENIES PAIN

## 2024-02-21 ASSESSMENT — LIFESTYLE VARIABLES
HOW OFTEN DO YOU HAVE A DRINK CONTAINING ALCOHOL: NEVER
HOW MANY STANDARD DRINKS CONTAINING ALCOHOL DO YOU HAVE ON A TYPICAL DAY: PATIENT DOES NOT DRINK

## 2024-02-21 NOTE — ED PROVIDER NOTES
[facility] EM[facility]GENCY [facility]  EMERGENCY DEPARTMENT ENCOUNTER      Pt Name: Vance Bee  MRN: [id number]  Birthdate 1945  Date of evaluation: 2/21/2024  Provider: Angelito Cardenas MD    CHIEF COMPLAINT       Chief Complaint   Patient presents with    Emesis         HISTORY OF PRESENT ILLNESS   (Location/Symptom, Timing/Onset, Context/Setting, Quality, Duration, Modifying Factors, Severity)  Note limiting factors.   78F w/ hx CVA, HTN, HLD, AF, asthma, intracranial aneurysm p/w 1d N/V. Pt reports multiple episodes of N/V (nonbloody). Feeling generalized whole body weakness/fatigue. Feeling lightheaded but no syncope or vertigo. No head/neck or chest/abd pain. No speech/vision changes or focal ext weakness/numbness. No diarrhea, rectal bleeding or urinary symptoms. No F/C cough, URI like symptoms. No change in MS per daughter at bedside but \"feeling cloudy.\" Recent admission to [facility] for CVA and found to have multiple small intracranial aneurysms. Takes asa/plavix. No recent falls or trauma. No ssick contacts or travel.    Pt notes that a friend brought her left over food from [employer] and thinks might have food poisoning.            Review of External Medical Records:     Nursing Notes were reviewed.    REVIEW OF SYSTEMS    (2-9 systems for level 4, 10 or more for level 5)     Review of Systems   Constitutional:  Negative for diaphoresis and fever.   HENT:  Negative for nosebleeds.    Eyes:  Negative for visual disturbance.   Respiratory:  Negative for cough and shortness of breath.    Cardiovascular:  Negative for chest pain, palpitations and leg swelling.   Gastrointestinal:  Positive for nausea and vomiting. Negative for abdominal distention, abdominal pain, anal bleeding, blood in stool and diarrhea.   Endocrine: Negative for polyuria.   Genitourinary:  Negative for difficulty urinating, dysuria, frequency and hematuria.   Musculoskeletal:  Negative for joint swelling.   Skin:  Negative for wound.

## 2024-02-21 NOTE — ED TRIAGE NOTES
Pt reports intense nausea and vomiting since Monday. Pt denies pain or fevers. Pt reports she was supposed to have an iron infusion yesterday but they could not give her the infusion related to her BP being so high. Pt reports she has not been able to take any of her daily medications for the past 2 days r/t n/v.

## 2024-02-26 ENCOUNTER — OFFICE VISIT (OUTPATIENT)
Age: 79
End: 2024-02-26
Payer: MEDICARE

## 2024-02-26 VITALS
TEMPERATURE: 97.5 F | OXYGEN SATURATION: 98 % | HEART RATE: 76 BPM | SYSTOLIC BLOOD PRESSURE: 126 MMHG | DIASTOLIC BLOOD PRESSURE: 62 MMHG

## 2024-02-26 DIAGNOSIS — I67.1 CEREBRAL ANEURYSM: Primary | ICD-10-CM

## 2024-02-26 PROCEDURE — G8427 DOCREV CUR MEDS BY ELIG CLIN: HCPCS | Performed by: NURSE PRACTITIONER

## 2024-02-26 PROCEDURE — 1123F ACP DISCUSS/DSCN MKR DOCD: CPT | Performed by: NURSE PRACTITIONER

## 2024-02-26 PROCEDURE — G8419 CALC BMI OUT NRM PARAM NOF/U: HCPCS | Performed by: NURSE PRACTITIONER

## 2024-02-26 PROCEDURE — 1111F DSCHRG MED/CURRENT MED MERGE: CPT | Performed by: NURSE PRACTITIONER

## 2024-02-26 PROCEDURE — 1036F TOBACCO NON-USER: CPT | Performed by: NURSE PRACTITIONER

## 2024-02-26 PROCEDURE — 99213 OFFICE O/P EST LOW 20 MIN: CPT | Performed by: NURSE PRACTITIONER

## 2024-02-26 PROCEDURE — G8400 PT W/DXA NO RESULTS DOC: HCPCS | Performed by: NURSE PRACTITIONER

## 2024-02-26 PROCEDURE — G8484 FLU IMMUNIZE NO ADMIN: HCPCS | Performed by: NURSE PRACTITIONER

## 2024-02-26 PROCEDURE — 1090F PRES/ABSN URINE INCON ASSESS: CPT | Performed by: NURSE PRACTITIONER

## 2024-02-26 NOTE — PATIENT INSTRUCTIONS
Follow up in 1 year, 2/5/2025, after imaging is completed.  See attached paperwork to schedule imaging.

## 2024-02-26 NOTE — PROGRESS NOTES
Follow up for Cerebral aneurysm and imaging results.  Daughter at visit.  Patient reports she continues with frequent headaches, dizziness, blurred and double vision.  Reports episode of going to ED last week for constant vomiting.  No acute problems voiced.

## 2024-02-26 NOTE — PROGRESS NOTES
Clinic Note      Patient: Vance Bee MRN: [id number]  SSN: [id number]    YOB: 1945  Age: 78 y.o.  Sex: female      Subjective:      Vance Bee is a 78 y.o. female established patient with history of HTN, DMII, falls, anxiety, bipolar disorder, afib not on OAC, and 4 x 6 mm a-comm aneurysm, 2 mm right carotid terminus aneurysm, and 2 mm aneurysm at right PCA. These cerebral aneurysms were incidentally discovered in August, 2023 during a hospitalization for an acute left thalamic ischemic stroke.     She presents today accompanied by her daughter for cerebral aneurysm imaging surveillance follow up. She states she has not smoked any cigarettes since her stroke. She reports her blood pressure have been labile at home but she is addressing this with cardiology. Since her last visit, she saw cardiologist Dr Khalil 1/3/24, underwent a stress test and 30-day extended cardiac monitoring. She is to follow up with Dr Larkin for sick sinus and PAF. She was again admitted to [facility] 2/5/24 for AMS and syncope with concern for seizures, EEG negative that admission and she received 1 unit PRBC, was started on AED prophylaxis. She also was found to have a right foot fracture, for which she has followed up with [facility]. She underwent her 6 month follow up MRA brain while she was admitted. She denies any new neuro deficits but continues to have mild right sided weakness since her stroke.       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

## 2024-02-27 ENCOUNTER — HOSPITAL ENCOUNTER (OUTPATIENT)
Facility: HOSPITAL | Age: 79
Setting detail: INFUSION SERIES
Discharge: HOME | End: 2024-02-27
Payer: MEDICARE

## 2024-02-27 VITALS
DIASTOLIC BLOOD PRESSURE: 57 MMHG | RESPIRATION RATE: 20 BRPM | HEART RATE: 78 BPM | OXYGEN SATURATION: 98 % | TEMPERATURE: 98.5 F | SYSTOLIC BLOOD PRESSURE: 149 MMHG

## 2024-02-27 DIAGNOSIS — D50.8 OTHER IRON DEFICIENCY ANEMIA: Primary | ICD-10-CM

## 2024-02-27 PROCEDURE — 96365 THER/PROPH/DIAG IV INF INIT: CPT

## 2024-02-27 PROCEDURE — 2580000003 HC RX 258

## 2024-02-27 PROCEDURE — 6360000002 HC RX W HCPCS

## 2024-02-27 RX ORDER — ALBUTEROL SULFATE 90 UG/1
4 AEROSOL, METERED RESPIRATORY (INHALATION) PRN
Status: DISCONTINUED | OUTPATIENT
Start: 2024-02-27 | End: 2024-02-28 | Stop reason: HOSPADM

## 2024-02-27 RX ORDER — EPINEPHRINE 1 MG/ML
0.3 INJECTION, SOLUTION INTRAMUSCULAR; SUBCUTANEOUS PRN
Status: DISCONTINUED | OUTPATIENT
Start: 2024-02-27 | End: 2024-02-28 | Stop reason: HOSPADM

## 2024-02-27 RX ORDER — SODIUM CHLORIDE 9 MG/ML
5-250 INJECTION, SOLUTION INTRAVENOUS PRN
Status: DISCONTINUED | OUTPATIENT
Start: 2024-02-27 | End: 2024-02-28 | Stop reason: HOSPADM

## 2024-02-27 RX ORDER — HEPARIN 100 UNIT/ML
500 SYRINGE INTRAVENOUS PRN
Status: DISCONTINUED | OUTPATIENT
Start: 2024-02-27 | End: 2024-02-28 | Stop reason: HOSPADM

## 2024-02-27 RX ORDER — EPINEPHRINE 1 MG/ML
0.3 INJECTION, SOLUTION INTRAMUSCULAR; SUBCUTANEOUS PRN
OUTPATIENT
Start: 2024-03-05

## 2024-02-27 RX ORDER — SODIUM CHLORIDE 9 MG/ML
INJECTION, SOLUTION INTRAVENOUS CONTINUOUS
OUTPATIENT
Start: 2024-03-05

## 2024-02-27 RX ORDER — SODIUM CHLORIDE 0.9 % (FLUSH) 0.9 %
5-40 SYRINGE (ML) INJECTION PRN
OUTPATIENT
Start: 2024-03-05

## 2024-02-27 RX ORDER — SODIUM CHLORIDE 9 MG/ML
5-250 INJECTION, SOLUTION INTRAVENOUS PRN
Status: CANCELLED | OUTPATIENT
Start: 2024-03-05

## 2024-02-27 RX ORDER — SODIUM CHLORIDE 9 MG/ML
5-250 INJECTION, SOLUTION INTRAVENOUS PRN
OUTPATIENT
Start: 2024-03-05

## 2024-02-27 RX ORDER — HEPARIN 100 UNIT/ML
500 SYRINGE INTRAVENOUS PRN
OUTPATIENT
Start: 2024-03-05

## 2024-02-27 RX ORDER — ALBUTEROL SULFATE 90 UG/1
4 AEROSOL, METERED RESPIRATORY (INHALATION) PRN
OUTPATIENT
Start: 2024-03-05

## 2024-02-27 RX ORDER — SODIUM CHLORIDE 9 MG/ML
INJECTION, SOLUTION INTRAVENOUS CONTINUOUS
Status: DISCONTINUED | OUTPATIENT
Start: 2024-02-27 | End: 2024-02-28 | Stop reason: HOSPADM

## 2024-02-27 RX ORDER — DIPHENHYDRAMINE HYDROCHLORIDE 50 MG/ML
50 INJECTION INTRAMUSCULAR; INTRAVENOUS
Status: DISCONTINUED | OUTPATIENT
Start: 2024-02-27 | End: 2024-02-28 | Stop reason: HOSPADM

## 2024-02-27 RX ORDER — ACETAMINOPHEN 325 MG/1
650 TABLET ORAL
OUTPATIENT
Start: 2024-03-05

## 2024-02-27 RX ORDER — ACETAMINOPHEN 325 MG/1
650 TABLET ORAL
Status: DISCONTINUED | OUTPATIENT
Start: 2024-02-27 | End: 2024-02-28 | Stop reason: HOSPADM

## 2024-02-27 RX ORDER — SODIUM CHLORIDE 0.9 % (FLUSH) 0.9 %
5-40 SYRINGE (ML) INJECTION PRN
Status: DISCONTINUED | OUTPATIENT
Start: 2024-02-27 | End: 2024-02-28 | Stop reason: HOSPADM

## 2024-02-27 RX ORDER — ONDANSETRON 2 MG/ML
8 INJECTION INTRAMUSCULAR; INTRAVENOUS
OUTPATIENT
Start: 2024-03-05

## 2024-02-27 RX ORDER — ONDANSETRON 2 MG/ML
8 INJECTION INTRAMUSCULAR; INTRAVENOUS
Status: DISCONTINUED | OUTPATIENT
Start: 2024-02-27 | End: 2024-02-28 | Stop reason: HOSPADM

## 2024-02-27 RX ORDER — DIPHENHYDRAMINE HYDROCHLORIDE 50 MG/ML
50 INJECTION INTRAMUSCULAR; INTRAVENOUS
OUTPATIENT
Start: 2024-03-05

## 2024-02-27 RX ADMIN — FERRIC CARBOXYMALTOSE INJECTION 750 MG: 50 INJECTION, SOLUTION INTRAVENOUS at 12:30

## 2024-02-27 NOTE — PROGRESS NOTES
of this patient.    Greater than 45 minutes were spent in patient management, greater than half of which was spent in counseling and coordination of care.        Signed By: @SSD@    February 27, 2024

## 2024-02-27 NOTE — PROGRESS NOTES
[facility] Progress Note    Date: 2024    Name: Vance Bee    MRN: [id number]         : 1945    Ms. Bee Arrived ambulatory and in no distress for Injectafer.      Assessment was completed and documented in flowsheets. No acute concerns at this time. 24G IV placed without difficulty, positive blood return noted.    Ms. Bee's vital signs for this visit.  Patient Vitals for the past 24 hrs:   BP Temp Temp src Pulse Resp SpO2   24 1251 (!) 149/57 98.5 °F (36.9 °C) -- 78 -- --   24 1130 (!) 139/56 98.5 °F (36.9 °C) Temporal 86 20 98 %       No labs 2024      Medications given:   Medications Administered         ferric carboxymaltose (INJECTAFER) 750 mg in sodium chloride 0.9 % 250 mL IVPB Admin Date  2024 Action  New Bag Dose  750 mg Rate  870 mL/hr Route  IntraVENous Administered By  Ginna Ryan, RN              Ms. Bee tolerated the infusion, and had no complaints.  Pt declined 1 hour observation; VS stable.  PIV flushed and removed after completion of infusion. 2x2 and coban placed.     Ms. Bee was [facility] in [facility] condition and is aware of future appointments.     Future Appointments   Date Time Provider Department Center   3/5/2024  3:00 PM [facility]   3/13/2024 11:00 AM Lazaro Khalil MD CAV BS [facility]   3/18/2024  1:30 PM Alisa Diana, APRN - CNP NEUMRSPB BS [facility]   3/27/2024  1:30 PM Nicholas Ramos, APRN - NP NEUSM BS [facility]   2024  9:40 AM Narinder Larkin MD CAVREY BS [facility]   2024 10:30 AM [facility]   2024 10:45 AM Amirah Vann MD [facility] BS [facility]       JAYME LOVELACE, TORI  2024  3:35 PM

## 2024-03-04 ENCOUNTER — TELEPHONE (OUTPATIENT)
Age: 79
End: 2024-03-04

## 2024-03-05 ENCOUNTER — APPOINTMENT (OUTPATIENT)
Facility: HOSPITAL | Age: 79
End: 2024-03-05
Payer: MEDICARE

## 2024-03-05 ENCOUNTER — HOSPITAL ENCOUNTER (OUTPATIENT)
Facility: HOSPITAL | Age: 79
Setting detail: INFUSION SERIES
Discharge: HOME | End: 2024-03-05
Payer: MEDICARE

## 2024-03-05 VITALS
TEMPERATURE: 98.4 F | SYSTOLIC BLOOD PRESSURE: 170 MMHG | DIASTOLIC BLOOD PRESSURE: 67 MMHG | RESPIRATION RATE: 18 BRPM | HEART RATE: 85 BPM

## 2024-03-05 DIAGNOSIS — D50.8 OTHER IRON DEFICIENCY ANEMIA: Primary | ICD-10-CM

## 2024-03-05 PROCEDURE — 96365 THER/PROPH/DIAG IV INF INIT: CPT

## 2024-03-05 PROCEDURE — 6360000002 HC RX W HCPCS

## 2024-03-05 PROCEDURE — 2580000003 HC RX 258

## 2024-03-05 RX ORDER — ALBUTEROL SULFATE 90 UG/1
4 AEROSOL, METERED RESPIRATORY (INHALATION) PRN
OUTPATIENT
Start: 2024-03-05

## 2024-03-05 RX ORDER — EPINEPHRINE 1 MG/ML
0.3 INJECTION, SOLUTION INTRAMUSCULAR; SUBCUTANEOUS PRN
OUTPATIENT
Start: 2024-03-05

## 2024-03-05 RX ORDER — SODIUM CHLORIDE 0.9 % (FLUSH) 0.9 %
5-40 SYRINGE (ML) INJECTION PRN
OUTPATIENT
Start: 2024-03-05

## 2024-03-05 RX ORDER — DIPHENHYDRAMINE HYDROCHLORIDE 50 MG/ML
50 INJECTION INTRAMUSCULAR; INTRAVENOUS
OUTPATIENT
Start: 2024-03-05

## 2024-03-05 RX ORDER — SODIUM CHLORIDE 9 MG/ML
5-250 INJECTION, SOLUTION INTRAVENOUS PRN
Status: CANCELLED | OUTPATIENT
Start: 2024-03-05

## 2024-03-05 RX ORDER — SODIUM CHLORIDE 9 MG/ML
5-250 INJECTION, SOLUTION INTRAVENOUS PRN
OUTPATIENT
Start: 2024-03-05

## 2024-03-05 RX ORDER — SODIUM CHLORIDE 9 MG/ML
INJECTION, SOLUTION INTRAVENOUS CONTINUOUS
OUTPATIENT
Start: 2024-03-05

## 2024-03-05 RX ORDER — ACETAMINOPHEN 325 MG/1
650 TABLET ORAL
OUTPATIENT
Start: 2024-03-05

## 2024-03-05 RX ORDER — SODIUM CHLORIDE 9 MG/ML
5-250 INJECTION, SOLUTION INTRAVENOUS PRN
Status: DISCONTINUED | OUTPATIENT
Start: 2024-03-05 | End: 2024-03-06 | Stop reason: HOSPADM

## 2024-03-05 RX ORDER — HEPARIN 100 UNIT/ML
500 SYRINGE INTRAVENOUS PRN
OUTPATIENT
Start: 2024-03-05

## 2024-03-05 RX ORDER — ONDANSETRON 2 MG/ML
8 INJECTION INTRAMUSCULAR; INTRAVENOUS
OUTPATIENT
Start: 2024-03-05

## 2024-03-05 RX ADMIN — SODIUM CHLORIDE 25 ML/HR: 9 INJECTION, SOLUTION INTRAVENOUS at 15:05

## 2024-03-05 RX ADMIN — FERRIC CARBOXYMALTOSE INJECTION 750 MG: 50 INJECTION, SOLUTION INTRAVENOUS at 15:28

## 2024-03-05 NOTE — PROGRESS NOTES
[facility] Progress Note    Date: 2024    Name: Vance Bee    MRN: [id number]         : 1945    Ms. Bee Arrived ambulatory and in no distress for Injectafer.      Assessment was completed and documented in flowsheets. No acute concerns at this time. 24G IV placed without difficulty, positive blood return noted.    Ms. Bee's vital signs for this visit.  Patient Vitals for the past 24 hrs:   BP Temp Temp src Pulse Resp   24 1545 (!) 170/67 -- -- 85 --   24 1445 (!) 155/67 98.4 °F (36.9 °C) Temporal 88 18         No labs 3/5/2024      Medications given:   Medications Administered         0.9 % sodium chloride infusion Admin Date  2024 Action  New Bag Dose  25 mL/hr Rate  25 mL/hr Route  IntraVENous Administered By  Ousmane Zaragoza RN        ferric carboxymaltose (INJECTAFER) 750 mg in sodium chloride 0.9 % 250 mL IVPB Admin Date  2024 Action  New Bag Dose  750 mg Rate  870 mL/hr Route  IntraVENous Administered By  Ousmane Zaragoza, TORI              Ms. Bee tolerated the infusion, and had no complaints.  Pt declined 1 hour observation; VS stable.  PIV flushed and removed after completion of infusion. 2x2 and coban placed.     Ms. Bee was discharged [facility] in stable condition and is aware of future appointments.     Future Appointments   Date Time Provider Department Center   3/13/2024 11:00 AM Lazaro Khalil MD CAV BS AMB   3/18/2024  1:30 PM Alisa Diana, APRN - CNP NEUMRSPB BS AMB   3/27/2024  1:30 PM Nicholas Ramos, APRN - NP NEUSM BS AMB   2024  9:40 AM Narinder Larkin MD CAVREY BS [facility]   2024 11:00 AM H3 SHELLY LAB [facility]   2024 11:15 AM Amirah Vann MD [facility] BS AMB       OUSMANE ZARAGOZA RN  2024  3:57 PM

## 2024-03-13 ENCOUNTER — OFFICE VISIT (OUTPATIENT)
Age: 79
End: 2024-03-13
Payer: MEDICARE

## 2024-03-13 VITALS
DIASTOLIC BLOOD PRESSURE: 62 MMHG | HEIGHT: 60 IN | BODY MASS INDEX: 27.56 KG/M2 | HEART RATE: 94 BPM | WEIGHT: 140.4 LBS | SYSTOLIC BLOOD PRESSURE: 138 MMHG | OXYGEN SATURATION: 95 %

## 2024-03-13 DIAGNOSIS — Z78.9 STATIN INTOLERANCE: ICD-10-CM

## 2024-03-13 DIAGNOSIS — I63.81 THALAMIC STROKE (HCC): ICD-10-CM

## 2024-03-13 DIAGNOSIS — Z09 HOSPITAL DISCHARGE FOLLOW-UP: ICD-10-CM

## 2024-03-13 DIAGNOSIS — I10 ESSENTIAL (PRIMARY) HYPERTENSION: ICD-10-CM

## 2024-03-13 DIAGNOSIS — R00.2 PALPITATIONS: ICD-10-CM

## 2024-03-13 DIAGNOSIS — I49.5 SSS (SICK SINUS SYNDROME) (HCC): Primary | ICD-10-CM

## 2024-03-13 PROCEDURE — 1123F ACP DISCUSS/DSCN MKR DOCD: CPT | Performed by: SPECIALIST

## 2024-03-13 PROCEDURE — G8427 DOCREV CUR MEDS BY ELIG CLIN: HCPCS | Performed by: SPECIALIST

## 2024-03-13 PROCEDURE — 99214 OFFICE O/P EST MOD 30 MIN: CPT | Performed by: SPECIALIST

## 2024-03-13 PROCEDURE — 3075F SYST BP GE 130 - 139MM HG: CPT | Performed by: SPECIALIST

## 2024-03-13 PROCEDURE — 1090F PRES/ABSN URINE INCON ASSESS: CPT | Performed by: SPECIALIST

## 2024-03-13 PROCEDURE — G8419 CALC BMI OUT NRM PARAM NOF/U: HCPCS | Performed by: SPECIALIST

## 2024-03-13 PROCEDURE — G8400 PT W/DXA NO RESULTS DOC: HCPCS | Performed by: SPECIALIST

## 2024-03-13 PROCEDURE — 1036F TOBACCO NON-USER: CPT | Performed by: SPECIALIST

## 2024-03-13 PROCEDURE — 3078F DIAST BP <80 MM HG: CPT | Performed by: SPECIALIST

## 2024-03-13 PROCEDURE — 1111F DSCHRG MED/CURRENT MED MERGE: CPT | Performed by: SPECIALIST

## 2024-03-13 PROCEDURE — G8484 FLU IMMUNIZE NO ADMIN: HCPCS | Performed by: SPECIALIST

## 2024-03-13 ASSESSMENT — PATIENT HEALTH QUESTIONNAIRE - PHQ9
SUM OF ALL RESPONSES TO PHQ9 QUESTIONS 1 & 2: 0
SUM OF ALL RESPONSES TO PHQ QUESTIONS 1-9: 0
2. FEELING DOWN, DEPRESSED OR HOPELESS: 0
1. LITTLE INTEREST OR PLEASURE IN DOING THINGS: 0

## 2024-03-13 NOTE — PROGRESS NOTES
HISTORY OF PRESENT ILLNESS  Vance Bee is a 78 y.o. female   She has a 3 thalamic stroke and treated hypertension.  She has also had atrial tachycardia in the past and more recently was found to have accelerated junctional rhythm.  She recently was in the hospital for confusion and found to be more profoundly anemic.  There was no occult blood noted in her stool.  She was given a unit of packed cells and started on intravenous iron.  She declined gastrointestinal evaluation.  She had a negative stress test in January.  Her seizure medicine was also increased.  According to her the patient and her daughter she is doing much better.  When she had the accelerated junctional rhythm her heart rate was 72 bpm.  She has an appointment to see Dr. Larkin in about a month in this regard.  She continues to take diltiazem 240 mg once a day.  HPI     Specialty Problems          Cardiology Problems    Pure hypercholesterolemia        Acute CVA (cerebrovascular accident) ([facility])        Chest pain        Chronic ischemic left MCA stroke        SSS (sick sinus syndrome) ([facility])          Current Outpatient Medications   Medication Instructions    aspirin 81 mg, Oral, DAILY    Biotin 2.5 mg, Oral, DAILY    Calcium Carbonate-Vitamin D 600-3.125 MG-MCG TABS 1 tablet, Oral, DAILY    clopidogrel (PLAVIX) 75 mg, Oral, DAILY    cyanocobalamin 100 mcg, Oral, DAILY    dilTIAZem (CARDIZEM CD) 240 mg, Oral, DAILY    doxepin (SINEQUAN) 150 mg, Oral, EVERY BEDTIME    ezetimibe (ZETIA) 10 mg, Oral, DAILY    gabapentin (NEURONTIN) 300 mg, Oral, 3 TIMES DAILY    ketoconazole (NIZORAL) 2 % shampoo APPLY SHAMPOO TO SCALP TOPICALLY AT LEAST TWICE A WEEK AS DIRECTED    losartan (COZAAR) 100 mg, Oral, DAILY    Magnesium 400 Units, Oral, DAILY    metFORMIN (GLUCOPHAGE) 1,000 mg, Oral, 2 TIMES DAILY WITH MEALS    ondansetron (ZOFRAN-ODT) 4 mg, Oral, 3 TIMES DAILY PRN    OXcarbazepine (TRILEPTAL) 300 MG tablet Take 1 tablet by mouth every morning for 4 days,

## 2024-03-18 ENCOUNTER — TELEMEDICINE (OUTPATIENT)
Age: 79
End: 2024-03-18
Payer: MEDICARE

## 2024-03-18 DIAGNOSIS — G40.909 SEIZURE DISORDER (HCC): Primary | ICD-10-CM

## 2024-03-18 PROCEDURE — G8400 PT W/DXA NO RESULTS DOC: HCPCS | Performed by: NURSE PRACTITIONER

## 2024-03-18 PROCEDURE — 1123F ACP DISCUSS/DSCN MKR DOCD: CPT | Performed by: NURSE PRACTITIONER

## 2024-03-18 PROCEDURE — G8419 CALC BMI OUT NRM PARAM NOF/U: HCPCS | Performed by: NURSE PRACTITIONER

## 2024-03-18 PROCEDURE — 1036F TOBACCO NON-USER: CPT | Performed by: NURSE PRACTITIONER

## 2024-03-18 PROCEDURE — 99214 OFFICE O/P EST MOD 30 MIN: CPT | Performed by: NURSE PRACTITIONER

## 2024-03-18 PROCEDURE — G8484 FLU IMMUNIZE NO ADMIN: HCPCS | Performed by: NURSE PRACTITIONER

## 2024-03-18 PROCEDURE — 1090F PRES/ABSN URINE INCON ASSESS: CPT | Performed by: NURSE PRACTITIONER

## 2024-03-18 PROCEDURE — G8427 DOCREV CUR MEDS BY ELIG CLIN: HCPCS | Performed by: NURSE PRACTITIONER

## 2024-03-18 RX ORDER — OXCARBAZEPINE 300 MG/1
TABLET, FILM COATED ORAL
Qty: 180 TABLET | Refills: 5 | Status: SHIPPED | OUTPATIENT
Start: 2024-03-18

## 2024-03-18 NOTE — ASSESSMENT & PLAN NOTE
Patient was admitted to the hospital 2/5-2/92024 with symptoms concerning for seizure including recurrent syncopal events without definitive diagnosis, altered mental status, episodes of staring and altered level of consciousness with rhythmic arm moving.  Patients are amnestic of these events. Patient was started on oxycarbazepine 300 mg nightly x 7 days then increased to 600 mg every morning.  She is to continue taking her evening dose of 900 mg (was already on for bipolar disorder).      02/05/2024: EEG: CLINICAL INTERPRETATION: This was a normal EEG during wakeful state of the patient. No lateralizing or epileptiform features were noted.     Patient has had 2 events and being discharged, the first on March 14 which sounded orthostatic in nature, however on Friday, March 15 she had as distinctly different event with a prolonged amnestic.  Patient was alone in her home, her daughter spoke with her on the phone she had slurred speech and was having difficulty finding her words.  Took the daughter about 30 minutes to drive to the patient's home when she found her she was in the chair difficult to arouse.  She did return to her neurological baseline but did not recall her daughter calling or even talking on the phone.  This event is concerning that she may have had another breakthrough seizure    Due to concerns for breakthrough seizure we will increase patient's oxycarbazepine  to 900 mg BID (three 300mg tablets).  Discussed increasing  oxycarbazepine dosing instead of adding a second agent on as I would like to maximize 1 agent before I placed the patient on 2 agents for seizures.    Patient has in-person follow-up scheduled for March 27, 2024 with nurse practitioner Nicholas Ramos.  Patient will keep that appointment.    - Recommend a oxycarbazepine  level is checked along with a chemistry to check patient's sodium.  Her last sodium on February 21, 2024 was 130. Per review of the medical record patient's

## 2024-03-18 NOTE — PROGRESS NOTES
Chi. Cardiology did not feel that the arrhythmia explained the symptoms and did not require any further intervention.    - Vitals show orthostatic hypotension, however, as noted above, she can be in any position for syncope to occur (sitting or standing).   - Routine EEG negative.     VV 03/18/2024: Patient presents for follow-up today with her daughter.  Overall she is doing well.  Her daughter feels she has had less volatility with her blood pressure/low blood pressure since she has had iron infusions.  She is taking Trileptal 600 mg in the morning, 900 mg at night.  However she has had 2 events since she was discharged from the hospital     - The first was last week Wednesday, March 13.  Patient states she went to see her cardiologist she did a lot of walking, when she got home she started making food, she felt dizzy she started shaking all over she felt weak she had diminished visual acuity.  She had no loss of consciousness or altered level of consciousness.     - She also had another event on Friday, March 15 which was distinctly different.  Patient recalls sitting in the chair, her daughter called her and was speaking with her the patient had slurred speech appeared to have troubles finding her words.  The daughter drove to the patient's home (took about 30 minutes) when she arrived there patient appeared to be sleeping quite deeply.  Daughter was able to arouse her but it took her about 15 or 20 minutes to get back to her baseline.  Patient is amnestic of this entire event.  She recalls sitting in the chair and then waking up sometime later.  She did not recall her daughter calling, she did not know how long her daughter had been there when she was back to her neurological baseline.    02/26/20204: Patient had follow-up with nurse karl jacob for her 4 x 6 mm a comm aneurysm and her 2 mm right carotid terminus aneurysm and a 2 mm aneurysm of the right PCA.  Patient's neuroexam is normal.  MRA of

## 2024-03-20 ENCOUNTER — TELEPHONE (OUTPATIENT)
Age: 79
End: 2024-03-20

## 2024-03-20 NOTE — TELEPHONE ENCOUNTER
Called patient. No answer. Stated I received a message from our NP Alisa about wanting a follow up with Dr. Henson. Made soonest we have, which is 07/26/24 at 10:20AM. Wanted to know if they wanted to keep the appointment 03/27/24 with NP Nicholas?

## 2024-03-26 ENCOUNTER — TELEPHONE (OUTPATIENT)
Age: 79
End: 2024-03-26

## 2024-03-26 NOTE — TELEPHONE ENCOUNTER
Call placed to patient.  2 identifiers received.  Asked patient if she wanted to keep appointment with CARLOS Ramos as she has just seen Alisa Diana and is scheduled to see Dr. Henosn in July.  Patient wants to cancel appointment.

## 2024-04-15 NOTE — PROGRESS NOTES
[facility]  Medical Oncology   [phone]    Hematology / Oncology Follow-up    Reason for Visit:   Vance Bee is a 78 y.o. female who is seen for follow-up of iron deficiency anemia.    Assessment:    Iron deficiency anemia, severe:  Diagnosed during feb 2024 hospitalization. Etiology unclear but has history of prior gastric ulcers and celiac disease (per patient) and is currently on aspirin/plavix. FOBT negative. Patient declined inpatient endoscopic work up inpatient but she is open to GI referral at this time.   Unable to tolerate oral iron due to baseline severe constipation. Has also failed to adequately respond to oral iron.   Now status post IV iron (Injectafer 750 mg x 2 doses). Repeat labs not yet done. We will repeat them now to ensure adequate response    - CBC with diff, ferritin, iron profile - will call with the results  - Recommend GI evaluation - patient is agreeable at this time  - No Hematology follow up. Please re-refer prn     #) History of seizures:  Further management per Neurology    Thank you for involving me in the care of this patient.    Signed By: Amirah Vann MD    April 16, 2024        Interval History:   Chart reviewed, interval events since last visit noted.       Patient seen by me on inpatient consults when she was admitted for AMS and seizure. Labs on admission notable for hemoglobin 7.2 (from 10.6 in August 2023).     She has received 1 unit PRBC this admission and hemoglobin improved to 9. Work up of anemia notable for very low iron sat 6%, low iron, and high-normal TIBC. Ferritin not sent. B12 and folate wnl. She was on aspirin and plavix PTA given history of stroke. She received 1 unit PRBCs and Venofer 200 mx 1 dose. She declined GI work up as inpatient. Her AC and antiplatelet agents were held on discharge.    Of note, she has a history of INGRID in 9/2020, requiring inpatient admission. She was seen by GI but endoscopic evaluation never pursued.

## 2024-04-16 ENCOUNTER — HOSPITAL ENCOUNTER (OUTPATIENT)
Facility: HOSPITAL | Age: 79
Setting detail: INFUSION SERIES
Discharge: HOME | End: 2024-04-16
Payer: MEDICARE

## 2024-04-16 ENCOUNTER — OFFICE VISIT (OUTPATIENT)
Age: 79
End: 2024-04-16
Payer: MEDICARE

## 2024-04-16 ENCOUNTER — TELEPHONE (OUTPATIENT)
Age: 79
End: 2024-04-16

## 2024-04-16 VITALS
BODY MASS INDEX: 27.88 KG/M2 | DIASTOLIC BLOOD PRESSURE: 58 MMHG | HEIGHT: 60 IN | HEART RATE: 71 BPM | OXYGEN SATURATION: 96 % | SYSTOLIC BLOOD PRESSURE: 134 MMHG | WEIGHT: 142 LBS

## 2024-04-16 VITALS
HEART RATE: 74 BPM | RESPIRATION RATE: 16 BRPM | TEMPERATURE: 98.6 F | DIASTOLIC BLOOD PRESSURE: 69 MMHG | SYSTOLIC BLOOD PRESSURE: 162 MMHG

## 2024-04-16 VITALS
RESPIRATION RATE: 16 BRPM | DIASTOLIC BLOOD PRESSURE: 49 MMHG | HEART RATE: 74 BPM | WEIGHT: 142 LBS | OXYGEN SATURATION: 96 % | SYSTOLIC BLOOD PRESSURE: 147 MMHG | BODY MASS INDEX: 27.73 KG/M2

## 2024-04-16 DIAGNOSIS — Z86.73 HISTORY OF ISCHEMIC VERTEBROBASILAR ARTERY THALAMIC STROKE: ICD-10-CM

## 2024-04-16 DIAGNOSIS — I49.5 SICK SINUS SYNDROME DUE TO SA NODE DYSFUNCTION (HCC): Primary | ICD-10-CM

## 2024-04-16 DIAGNOSIS — D50.0 IRON DEFICIENCY ANEMIA DUE TO CHRONIC BLOOD LOSS: Primary | ICD-10-CM

## 2024-04-16 DIAGNOSIS — I49.8 JUNCTIONAL RHYTHM: ICD-10-CM

## 2024-04-16 DIAGNOSIS — I10 PRIMARY HYPERTENSION: ICD-10-CM

## 2024-04-16 DIAGNOSIS — K90.0 CELIAC DISEASE: ICD-10-CM

## 2024-04-16 DIAGNOSIS — I44.1 WENCKEBACH: ICD-10-CM

## 2024-04-16 DIAGNOSIS — I49.5 SINOATRIAL NODE DYSFUNCTION (HCC): ICD-10-CM

## 2024-04-16 DIAGNOSIS — D50.8 OTHER IRON DEFICIENCY ANEMIA: ICD-10-CM

## 2024-04-16 DIAGNOSIS — I63.81 THALAMIC STROKE (HCC): ICD-10-CM

## 2024-04-16 DIAGNOSIS — Z87.19 HISTORY OF GI BLEED: ICD-10-CM

## 2024-04-16 LAB
BASOPHILS # BLD: 0.1 K/UL (ref 0–0.1)
BASOPHILS NFR BLD: 1 % (ref 0–1)
DIFFERENTIAL METHOD BLD: ABNORMAL
EOSINOPHIL # BLD: 0.1 K/UL (ref 0–0.4)
EOSINOPHIL NFR BLD: 2 % (ref 0–7)
ERYTHROCYTE [DISTWIDTH] IN BLOOD BY AUTOMATED COUNT: 21.5 % (ref 11.5–14.5)
FERRITIN SERPL-MCNC: 367 NG/ML (ref 26–388)
HCT VFR BLD AUTO: 38.4 % (ref 35–47)
HGB BLD-MCNC: 12.6 G/DL (ref 11.5–16)
IMM GRANULOCYTES # BLD AUTO: 0.1 K/UL (ref 0–0.04)
IMM GRANULOCYTES NFR BLD AUTO: 1 % (ref 0–0.5)
IRON SATN MFR SERPL: 26 % (ref 20–50)
IRON SERPL-MCNC: 84 UG/DL (ref 35–150)
LYMPHOCYTES # BLD: 1.7 K/UL (ref 0.8–3.5)
LYMPHOCYTES NFR BLD: 29 % (ref 12–49)
MCH RBC QN AUTO: 28.3 PG (ref 26–34)
MCHC RBC AUTO-ENTMCNC: 32.8 G/DL (ref 30–36.5)
MCV RBC AUTO: 86.1 FL (ref 80–99)
MONOCYTES # BLD: 0.6 K/UL (ref 0–1)
MONOCYTES NFR BLD: 10 % (ref 5–13)
NEUTS SEG # BLD: 3.4 K/UL (ref 1.8–8)
NEUTS SEG NFR BLD: 57 % (ref 32–75)
NRBC # BLD: 0 K/UL (ref 0–0.01)
NRBC BLD-RTO: 0 PER 100 WBC
PLATELET # BLD AUTO: 282 K/UL (ref 150–400)
PMV BLD AUTO: 9.9 FL (ref 8.9–12.9)
RBC # BLD AUTO: 4.46 M/UL (ref 3.8–5.2)
RBC MORPH BLD: ABNORMAL
TIBC SERPL-MCNC: 325 UG/DL (ref 250–450)
WBC # BLD AUTO: 6 K/UL (ref 3.6–11)

## 2024-04-16 PROCEDURE — 83540 ASSAY OF IRON: CPT

## 2024-04-16 PROCEDURE — 1123F ACP DISCUSS/DSCN MKR DOCD: CPT | Performed by: INTERNAL MEDICINE

## 2024-04-16 PROCEDURE — 99213 OFFICE O/P EST LOW 20 MIN: CPT | Performed by: INTERNAL MEDICINE

## 2024-04-16 PROCEDURE — G8427 DOCREV CUR MEDS BY ELIG CLIN: HCPCS | Performed by: INTERNAL MEDICINE

## 2024-04-16 PROCEDURE — 1090F PRES/ABSN URINE INCON ASSESS: CPT | Performed by: INTERNAL MEDICINE

## 2024-04-16 PROCEDURE — G8400 PT W/DXA NO RESULTS DOC: HCPCS | Performed by: INTERNAL MEDICINE

## 2024-04-16 PROCEDURE — 3078F DIAST BP <80 MM HG: CPT | Performed by: INTERNAL MEDICINE

## 2024-04-16 PROCEDURE — 82728 ASSAY OF FERRITIN: CPT

## 2024-04-16 PROCEDURE — 83550 IRON BINDING TEST: CPT

## 2024-04-16 PROCEDURE — 99204 OFFICE O/P NEW MOD 45 MIN: CPT | Performed by: INTERNAL MEDICINE

## 2024-04-16 PROCEDURE — 3075F SYST BP GE 130 - 139MM HG: CPT | Performed by: INTERNAL MEDICINE

## 2024-04-16 PROCEDURE — 1036F TOBACCO NON-USER: CPT | Performed by: INTERNAL MEDICINE

## 2024-04-16 PROCEDURE — 85025 COMPLETE CBC W/AUTO DIFF WBC: CPT

## 2024-04-16 PROCEDURE — G8419 CALC BMI OUT NRM PARAM NOF/U: HCPCS | Performed by: INTERNAL MEDICINE

## 2024-04-16 PROCEDURE — 36415 COLL VENOUS BLD VENIPUNCTURE: CPT

## 2024-04-16 RX ORDER — CHLORHEXIDINE GLUCONATE 213 G/1000ML
SOLUTION TOPICAL
Qty: 1 EACH | Refills: 0 | Status: SHIPPED | OUTPATIENT
Start: 2024-04-16 | End: 2024-04-30

## 2024-04-16 ASSESSMENT — PATIENT HEALTH QUESTIONNAIRE - PHQ9
SUM OF ALL RESPONSES TO PHQ9 QUESTIONS 1 & 2: 0
SUM OF ALL RESPONSES TO PHQ QUESTIONS 1-9: 0
2. FEELING DOWN, DEPRESSED OR HOPELESS: NOT AT ALL
SUM OF ALL RESPONSES TO PHQ QUESTIONS 1-9: 0
1. LITTLE INTEREST OR PLEASURE IN DOING THINGS: NOT AT ALL

## 2024-04-16 NOTE — TELEPHONE ENCOUNTER
1:51     Patient HIPAA Verified x2       Spokew with patient and let her know  that her labs show complete resolution of iron deficiency anemia.     Patient verbalized understanding no question or concerns at this time

## 2024-04-16 NOTE — H&P (VIEW-ONLY)
[facility] Cardiology   Cardiac Electrophysiology Clinic Care Note                   [x]Initial visit     [ ]Established visit     Patient Name: Vance Bee - :1945 - MRN:[id number]   Primary Cardiologist: Lazaro Khalil MD   Electrophysiologist: Narinder Larkin MD     Reason for visit: PAT & accelerated junctional arrhythmia.     HPI:   Ms. Bee is a 78 y.o. female who presents in consideration for possible pacemaker.   Her daughter is with her    She has intermittent episodes of AMS & lightheadedness, but no syncope since 2024.  Currently on diltiazem  mg po daily for history of PAT.  She states HR sometimes drops into the 40s.     Telemetry during hospital admission in 2024 showed intermittent Mobitz I AVB, otherwise sinus rhythm.     Holter in 2024 showed sinus bradycardia with accelerated junctional rhythm & PJCs.     Nuclear stress test in 2024 showed LVEF 72% with normal LV perfusion.     Echo in 2023 showed LVEF 70-75% with mild LVH, grade I diastolic dysfunction. Mildly dilated LA, mild MR with mild MAC, & mild AR.     BP elevated.     Currently on DAPT, has had no known bleeding issues since hospital admission in 2024. Cannot take OAC   Hx of PAF      Previous:   Admitted with confusion in 2024.  Profoundly anemic, but no occult blood noted in stool.  Received 1 unit PRBCs & started iron IV.  Declined gastro eval.      MRI showed small acute infarction left thalamus in 2023.     Thalamic CVA x 3.        Assessment and Plan      Diagnosis Orders   1. Sick sinus syndrome due to SA node dysfunction (HCC)        2. Wenckebach        3. Junctional rhythm        4. Primary hypertension        5. History of ischemic vertebrobasilar artery thalamic stroke        6. Thalamic stroke (HCC)        7. History of GI bleed              Sick sinus syndrome and intermittent 2nd degree Mobitz I AVB: Noted during hospital admission in 2024 in the setting of anemia.  Holter in  to moderate hiatal hernia with distal esophagitis   PELVIS: No pelvic lymphadenopathy or free fluid.   BONES: Moderate degenerative changes in the spine   VISUALIZED THORAX: No significant abnormality   ADDITIONAL COMMENTS: N/A     Impression   1. No acute abnormality.   2. Mild to moderate hiatal hernia with distal esophagitis.     MRI Result (most recent):   MRI BRAIN W WO CONTRAST 02/06/2024     Narrative   EXAM: MRI BRAIN W WO CONTRAST     INDICATION: 78-year-old female with multiple syncopal events-rule out stroke and   seizure focus     COMPARISON: 2/5/2024 CT and MRAs with 8/4/2023 MRI.     CONTRAST:  13 mL of ProHance.     TECHNIQUE:   Multiplanar multisequence acquisition of the brain prior to and following  IV   contrast administration. Additional T2-weighted imaging sequences with special   focus on the medial temporal lobes were also acquired.       FINDINGS:   There is no acute infarct, hemorrhage, extra-axial fluid collection, or mass   effect.     Mild diffuse periventricular and moderate patchy periventricular/subcortical   white matter T2/FLAIR hyperintensity, nonspecific and likely microangiopathic   ischemic changes.. The ventricles are normal in size and position, noting   probable central predominant mild diffuse volume loss which is similar to prior   studies. Incidental notice of cavum septum pellucidum, developmental variant.   The medial temporal lobes, including the hippocampal formations are bilaterally   symmetric and grossly normal in size and signal intensity with preserved   internal architecture. No obvious mass lesions seen.   There is no cerebellar tonsillar herniation. Expected arterial flow-voids are   present. Dural venous sinuses are patent and without filling defect to suggest   thrombosis.   No mass or abnormal enhancement     Mild diffuse mucosal thickening of the paranasal sinuses. Moderate right mastoid   effusion, progressed since prior MRI. Status post bilateral lense

## 2024-04-16 NOTE — PROGRESS NOTES
[facility] Cardiology   Cardiac Electrophysiology Clinic Care Note                   [x]Initial visit     [ ]Established visit     Patient Name: Vance Bee - :1945 - MRN:[id number]   Primary Cardiologist: Lazaro Khalil MD   Electrophysiologist: Narinder Larkin MD     Reason for visit: PAT & accelerated junctional arrhythmia.     HPI:   Ms. Bee is a 78 y.o. female who presents in consideration for possible pacemaker.   Her daughter is with her    She has intermittent episodes of AMS & lightheadedness, but no syncope since 2024.  Currently on diltiazem  mg po daily for history of PAT.  She states HR sometimes drops into the 40s.     Telemetry during hospital admission in 2024 showed intermittent Mobitz I AVB, otherwise sinus rhythm.     Holter in 2024 showed sinus bradycardia with accelerated junctional rhythm & PJCs.     Nuclear stress test in 2024 showed LVEF 72% with normal LV perfusion.     Echo in 2023 showed LVEF 70-75% with mild LVH, grade I diastolic dysfunction. Mildly dilated LA, mild MR with mild MAC, & mild AR.     BP elevated.     Currently on DAPT, has had no known bleeding issues since hospital admission in 2024. Cannot take OAC   Hx of PAF      Previous:   Admitted with confusion in 2024.  Profoundly anemic, but no occult blood noted in stool.  Received 1 unit PRBCs & started iron IV.  Declined gastro eval.      MRI showed small acute infarction left thalamus in 2023.     Thalamic CVA x 3.        Assessment and Plan      Diagnosis Orders   1. Sick sinus syndrome due to SA node dysfunction (HCC)        2. Wenckebach        3. Junctional rhythm        4. Primary hypertension        5. History of ischemic vertebrobasilar artery thalamic stroke        6. Thalamic stroke (HCC)        7. History of GI bleed              Sick sinus syndrome and intermittent 2nd degree Mobitz I AVB: Noted during hospital admission in 2024 in the setting of anemia.  Holter in

## 2024-04-16 NOTE — PROGRESS NOTES
[facility] Progress Note    Date: 2024    Name: Vance Bee    MRN: [id number]         : 1945      Pt arrived ambulatory and in no distress, for lab visit.  Labs drawn via R AC, patient tolerated well.        Vitals:    24 1100   BP: (!) 162/69   Pulse: 74   Resp: 16   Temp: 98.6 °F (37 °C)       Lab results were obtained and reviewed.  No results found for this or any previous visit (from the past 12 hour(s)).        Departed [facility] ambulatory and in no distress.    Future Appointments   Date Time Provider Department Center   2024 11:20 AM Amirah Vann MD [facility] BS AMB   2024 10:20 AM Jarred Henson MD [facility] BS AMB   2024 11:00 AM Lazaro Khalil MD CAV BS AMB       OUSMANE DOMINGUEZ RN  2024

## 2024-05-01 ENCOUNTER — TELEPHONE (OUTPATIENT)
Age: 79
End: 2024-05-01

## 2024-05-01 DIAGNOSIS — I44.1 WENCKEBACH: ICD-10-CM

## 2024-05-01 DIAGNOSIS — I49.5 SINOATRIAL NODE DYSFUNCTION (HCC): Primary | ICD-10-CM

## 2024-05-01 DIAGNOSIS — I49.8 JUNCTIONAL RHYTHM: ICD-10-CM

## 2024-05-01 DIAGNOSIS — D64.9 ANEMIA: ICD-10-CM

## 2024-05-01 DIAGNOSIS — I10 ESSENTIAL (PRIMARY) HYPERTENSION: ICD-10-CM

## 2024-05-01 NOTE — TELEPHONE ENCOUNTER
Patient also need an order for a BMP because it was missed when she had her other lab work done.Patient is going to the [place] draw site to have her labs done.    [phone]

## 2024-05-02 ENCOUNTER — PREP FOR PROCEDURE (OUTPATIENT)
Age: 79
End: 2024-05-02

## 2024-05-02 RX ORDER — SODIUM CHLORIDE 0.9 % (FLUSH) 0.9 %
5-40 SYRINGE (ML) INJECTION PRN
Status: CANCELLED | OUTPATIENT
Start: 2024-05-02

## 2024-05-02 RX ORDER — SODIUM CHLORIDE 9 MG/ML
INJECTION, SOLUTION INTRAVENOUS PRN
Status: CANCELLED | OUTPATIENT
Start: 2024-05-02

## 2024-05-02 RX ORDER — SODIUM CHLORIDE 0.9 % (FLUSH) 0.9 %
5-40 SYRINGE (ML) INJECTION EVERY 12 HOURS SCHEDULED
Status: CANCELLED | OUTPATIENT
Start: 2024-05-02

## 2024-05-06 DIAGNOSIS — I44.1 WENCKEBACH: ICD-10-CM

## 2024-05-06 DIAGNOSIS — D64.9 ANEMIA: ICD-10-CM

## 2024-05-06 DIAGNOSIS — I49.8 JUNCTIONAL RHYTHM: ICD-10-CM

## 2024-05-06 DIAGNOSIS — I49.5 SINOATRIAL NODE DYSFUNCTION (HCC): ICD-10-CM

## 2024-05-06 DIAGNOSIS — I10 ESSENTIAL (PRIMARY) HYPERTENSION: ICD-10-CM

## 2024-05-06 LAB
ANION GAP SERPL CALC-SCNC: 3 MMOL/L (ref 5–15)
BUN SERPL-MCNC: 8 MG/DL (ref 6–20)
BUN/CREAT SERPL: 13 (ref 12–20)
CALCIUM SERPL-MCNC: 9.3 MG/DL (ref 8.5–10.1)
CHLORIDE SERPL-SCNC: 101 MMOL/L (ref 97–108)
CO2 SERPL-SCNC: 29 MMOL/L (ref 21–32)
CREAT SERPL-MCNC: 0.61 MG/DL (ref 0.55–1.02)
ERYTHROCYTE [DISTWIDTH] IN BLOOD BY AUTOMATED COUNT: 18.6 % (ref 11.5–14.5)
GLUCOSE SERPL-MCNC: 126 MG/DL (ref 65–100)
HCT VFR BLD AUTO: 34.8 % (ref 35–47)
HGB BLD-MCNC: 11.5 G/DL (ref 11.5–16)
MCH RBC QN AUTO: 29.3 PG (ref 26–34)
MCHC RBC AUTO-ENTMCNC: 33 G/DL (ref 30–36.5)
MCV RBC AUTO: 88.5 FL (ref 80–99)
NRBC # BLD: 0 K/UL (ref 0–0.01)
NRBC BLD-RTO: 0 PER 100 WBC
PLATELET # BLD AUTO: 215 K/UL (ref 150–400)
PMV BLD AUTO: 10.7 FL (ref 8.9–12.9)
POTASSIUM SERPL-SCNC: 4.2 MMOL/L (ref 3.5–5.1)
RBC # BLD AUTO: 3.93 M/UL (ref 3.8–5.2)
SODIUM SERPL-SCNC: 133 MMOL/L (ref 136–145)
WBC # BLD AUTO: 6.9 K/UL (ref 3.6–11)

## 2024-05-09 NOTE — DISCHARGE INSTRUCTIONS
Patient Instructions Post-Pacemaker    Resume aspirin and plavix on 5/12/2024    1.  You may shower with your Aquacel chest dressing in place.      2.  You may remove your Aquacel chest dressing in 1 week, or it can be removed in clinic at follow up if you prefer.    3.  Do not rub/massage the site.    4.  Do not drive for 3 days.    5.  Do not raise affected arm above shoulder level & avoid pushing/pulling with affected arm.  You may use the sling as provided as a reminder to limit range of motion, but please do not completely immobilize your arm.  No lifting anything >5 lbs with affected arm.    6.  Call Dr. Larkin at [phone] if you experience any of the following symptoms:  Redness at the PM site  Swelling at or around the PM or in the left arm  Pain around the PM  Dizziness, lightheadedness, fainting spells  Lack of energy  Shortness of breath  Rapid heart rate  Chest or muscle twitches    7.  Follow-up with in clinic for wound & pacemaker function check on 05/24/2024 as noted below.    Future Appointments   Date Time Provider Department Center   5/24/2024 10:00 AM [id number], VERONICA REYNOSO BS AMB   5/24/2024 10:00 AM WOUND CHECKS, VERONICA REYNOSO BS AMB   7/26/2024 10:20 AM Jarred Henson MD NEUSM BS AMB   9/25/2024 11:00 AM Lazaro Khalil MD CAV BS AMB       Narinder Larkin M.D. [facility]  Electrophysiology/Cardiology  [facility] Cardiology  [address], [address]                      [address] [address], [address]  [place] [address]                             [facility] [address] [phone]

## 2024-05-10 ENCOUNTER — APPOINTMENT (OUTPATIENT)
Facility: HOSPITAL | Age: 79
End: 2024-05-10
Attending: INTERNAL MEDICINE
Payer: MEDICARE

## 2024-05-10 ENCOUNTER — HOSPITAL ENCOUNTER (OUTPATIENT)
Facility: HOSPITAL | Age: 79
Setting detail: OUTPATIENT SURGERY
Discharge: HOME | End: 2024-05-10
Attending: INTERNAL MEDICINE | Admitting: INTERNAL MEDICINE
Payer: MEDICARE

## 2024-05-10 VITALS
RESPIRATION RATE: 15 BRPM | WEIGHT: 141.54 LBS | DIASTOLIC BLOOD PRESSURE: 50 MMHG | TEMPERATURE: 98.1 F | SYSTOLIC BLOOD PRESSURE: 143 MMHG | HEART RATE: 62 BPM | OXYGEN SATURATION: 93 % | HEIGHT: 60 IN | BODY MASS INDEX: 27.79 KG/M2

## 2024-05-10 DIAGNOSIS — I49.5 SINOATRIAL NODE DYSFUNCTION (HCC): ICD-10-CM

## 2024-05-10 PROBLEM — Z95.0 PACEMAKER: Status: ACTIVE | Noted: 2024-05-10

## 2024-05-10 LAB — ECHO BSA: 1.65 M2

## 2024-05-10 PROCEDURE — C1892 INTRO/SHEATH,FIXED,PEEL-AWAY: HCPCS | Performed by: INTERNAL MEDICINE

## 2024-05-10 PROCEDURE — C1889 IMPLANT/INSERT DEVICE, NOC: HCPCS | Performed by: INTERNAL MEDICINE

## 2024-05-10 PROCEDURE — 33208 INSRT HEART PM ATRIAL & VENT: CPT

## 2024-05-10 PROCEDURE — 6360000004 HC RX CONTRAST MEDICATION: Performed by: INTERNAL MEDICINE

## 2024-05-10 PROCEDURE — 36005 INJECTION EXT VENOGRAPHY: CPT | Performed by: INTERNAL MEDICINE

## 2024-05-10 PROCEDURE — 33208 INSRT HEART PM ATRIAL & VENT: CPT | Performed by: INTERNAL MEDICINE

## 2024-05-10 PROCEDURE — C1898 LEAD, PMKR, OTHER THAN TRANS: HCPCS | Performed by: INTERNAL MEDICINE

## 2024-05-10 PROCEDURE — 99153 MOD SED SAME PHYS/QHP EA: CPT | Performed by: INTERNAL MEDICINE

## 2024-05-10 PROCEDURE — 2720000010 HC SURG SUPPLY STERILE: Performed by: INTERNAL MEDICINE

## 2024-05-10 PROCEDURE — 99152 MOD SED SAME PHYS/QHP 5/>YRS: CPT | Performed by: INTERNAL MEDICINE

## 2024-05-10 PROCEDURE — C1785 PMKR, DUAL, RATE-RESP: HCPCS | Performed by: INTERNAL MEDICINE

## 2024-05-10 PROCEDURE — 6360000002 HC RX W HCPCS: Performed by: INTERNAL MEDICINE

## 2024-05-10 PROCEDURE — 71045 X-RAY EXAM CHEST 1 VIEW: CPT

## 2024-05-10 PROCEDURE — 2709999900 HC NON-CHARGEABLE SUPPLY: Performed by: INTERNAL MEDICINE

## 2024-05-10 PROCEDURE — 2500000003 HC RX 250 WO HCPCS: Performed by: INTERNAL MEDICINE

## 2024-05-10 DEVICE — LEAD 5076-58 MRI US RCMCRD
Type: IMPLANTABLE DEVICE | Status: FUNCTIONAL
Brand: CAPSUREFIX NOVUS MRI™ SURESCAN®

## 2024-05-10 DEVICE — ENVELOPE CMRM6133 ABSORB LRG MR
Type: IMPLANTABLE DEVICE | Status: FUNCTIONAL
Brand: TYRX™

## 2024-05-10 DEVICE — IPG W1DR01 AZURE XT DR MRI USA
Type: IMPLANTABLE DEVICE | Status: FUNCTIONAL
Brand: AZURE™ XT DR MRI SURESCAN™

## 2024-05-10 DEVICE — LEAD 5076-52 MRI US RCMCRD
Type: IMPLANTABLE DEVICE | Status: FUNCTIONAL
Brand: CAPSUREFIX NOVUS MRI™ SURESCAN®

## 2024-05-10 RX ORDER — ONDANSETRON 2 MG/ML
4 INJECTION INTRAMUSCULAR; INTRAVENOUS EVERY 6 HOURS PRN
Status: DISCONTINUED | OUTPATIENT
Start: 2024-05-10 | End: 2024-05-10 | Stop reason: HOSPADM

## 2024-05-10 RX ORDER — HYDROCODONE BITARTRATE AND ACETAMINOPHEN 5; 325 MG/1; MG/1
1 TABLET ORAL EVERY 6 HOURS PRN
Status: DISCONTINUED | OUTPATIENT
Start: 2024-05-10 | End: 2024-05-10 | Stop reason: HOSPADM

## 2024-05-10 RX ORDER — SODIUM CHLORIDE 9 MG/ML
INJECTION, SOLUTION INTRAVENOUS PRN
Status: DISCONTINUED | OUTPATIENT
Start: 2024-05-10 | End: 2024-05-10 | Stop reason: HOSPADM

## 2024-05-10 RX ORDER — SODIUM CHLORIDE 0.9 % (FLUSH) 0.9 %
5-40 SYRINGE (ML) INJECTION PRN
Status: DISCONTINUED | OUTPATIENT
Start: 2024-05-10 | End: 2024-05-10 | Stop reason: HOSPADM

## 2024-05-10 RX ORDER — MIDAZOLAM HYDROCHLORIDE 1 MG/ML
INJECTION INTRAMUSCULAR; INTRAVENOUS PRN
Status: DISCONTINUED | OUTPATIENT
Start: 2024-05-10 | End: 2024-05-10 | Stop reason: HOSPADM

## 2024-05-10 RX ORDER — ACETAMINOPHEN 325 MG/1
650 TABLET ORAL EVERY 4 HOURS PRN
Status: DISCONTINUED | OUTPATIENT
Start: 2024-05-10 | End: 2024-05-10 | Stop reason: HOSPADM

## 2024-05-10 RX ORDER — FENTANYL CITRATE 50 UG/ML
INJECTION, SOLUTION INTRAMUSCULAR; INTRAVENOUS PRN
Status: DISCONTINUED | OUTPATIENT
Start: 2024-05-10 | End: 2024-05-10 | Stop reason: HOSPADM

## 2024-05-10 RX ORDER — LIDOCAINE HYDROCHLORIDE 10 MG/ML
INJECTION, SOLUTION INFILTRATION; PERINEURAL PRN
Status: DISCONTINUED | OUTPATIENT
Start: 2024-05-10 | End: 2024-05-10 | Stop reason: HOSPADM

## 2024-05-10 RX ORDER — SODIUM CHLORIDE 0.9 % (FLUSH) 0.9 %
5-40 SYRINGE (ML) INJECTION EVERY 12 HOURS SCHEDULED
Status: DISCONTINUED | OUTPATIENT
Start: 2024-05-10 | End: 2024-05-10 | Stop reason: HOSPADM

## 2024-05-10 RX ORDER — CEFAZOLIN SODIUM 1 G/3ML
INJECTION, POWDER, FOR SOLUTION INTRAMUSCULAR; INTRAVENOUS PRN
Status: DISCONTINUED | OUTPATIENT
Start: 2024-05-10 | End: 2024-05-10 | Stop reason: HOSPADM

## 2024-05-10 NOTE — INTERVAL H&P NOTE
Update History & Physical    The patient's History and Physical of April 16, 2024 was reviewed with the patient and I examined the patient. There was no change. The surgical site was confirmed by the patient and me.     Plan: The risks, benefits, expected outcome, and alternative to the recommended procedure have been discussed with the patient. Patient understands and wants to proceed with the procedure.     Electronically signed by Narinder Larkin MD on 5/10/2024 at 1:19 PM

## 2024-05-10 NOTE — PROCEDURES
Cardiac Procedure Note   Patient: Vance Bee  MRN: [id number]  SSN: [id number]   YOB: 1945 Age: 78 y.o.  Sex: female    Date of Procedure: 5/10/2024   Pre-procedure Diagnosis: Sick sinus syndrome and intermittent 2nd degree Mobitz I AVB with intermittent episodes of lightheadedness/near syncope.  She needs medications for PAT/PAF  Post-procedure Diagnosis: same  Procedure: Permanent Pacemaker Insertion  :  Dr. Narinder Larkin MD    Assistant(s):  None  Anesthesia: Moderate Sedation   Estimated Blood Loss: Less than 10 mL   Specimens Removed: None  Findings: left chest pacer with RAA lead and RV mid septal lead  Complications: None   Implants: dual chamber Medtronic pacer  Signed by:  Narinder Larkin MD  5/10/2024  3:16 PM

## 2024-05-10 NOTE — PROGRESS NOTES
12:10 PM  Patient arrived. ID and allergies verified verbally with patient. Pt voices understanding of procedure to be performed. Consent obtained. Pt prepped for procedure. Pt denies contrast allergy. Patient denies taking any blood thinners.    1:38 OM  TRANSFER - OUT REPORT:    Verbal report given to TORI Wilson on Vance Bee  being transferred to EP lab for ordered procedure       Report consisted of patient's Situation, Background, Assessment and   Recommendations(SBAR).     Information from the following report(s) Nurse Handoff Report was reviewed with the receiving nurse.           Lines:   Peripheral IV 05/10/24 Left Forearm (Active)        Opportunity for questions and clarification was provided.      Patient transported with:  Registered Nurse

## 2024-05-13 LAB — ECHO BSA: 1.65 M2

## 2024-05-14 ENCOUNTER — TELEPHONE (OUTPATIENT)
Age: 79
End: 2024-05-14

## 2024-05-14 NOTE — TELEPHONE ENCOUNTER
Verified patient with two types of identifiers.   Spoke with patient who complains of burning pain at incision. She denied any drainage, bleeding, or redness at site. She mentioned some swelling post implant however states that this has gone down.   Advised patient to ICE and use tylenol PRN and continue to monitor for signs and symptoms of infection. Advised patient to call with any drainage, swelling or redness as site.   Patient verbalized understanding and will call with any other questions.      Future Appointments   Date Time Provider Department Center   5/24/2024 10:00 AM [email or web address], [facility]   7/26/2024 10:20 AM Jarred Henson MD [facility]   8/13/2024 10:00 AM PACEMAKER3, [facility]   8/13/2024 10:20 AM Adeline Marc APRN - [facility]   9/25/2024 11:00 AM Lazaro Khalil MD [facility]

## 2024-05-24 ENCOUNTER — PROCEDURE VISIT (OUTPATIENT)
Age: 79
End: 2024-05-24

## 2024-05-24 DIAGNOSIS — Z95.0 CARDIAC PACEMAKER IN SITU: Primary | ICD-10-CM

## 2024-05-24 NOTE — PROGRESS NOTES
Patient presents for wound check post-device implantation. The dressing was removed prior to visit and the site was inspected. The site appeared to be well-healing without ecchymosis/tenderness/erythema. Denies pain, fevers, discharge.               Future Appointments   Date Time Provider Department Center   7/26/2024 10:20 AM Jarred Henson MD [facility]   8/13/2024 10:00 AM [facility] AM[facility]   8/13/2024 10:20 AM Adeline Marc APRN - [facility]   9/25/2024 11:00 AM Lazaro Khalil MD [facility]         Continue follow up in device clinic as planned.

## 2024-06-17 DIAGNOSIS — I65.09 VERTEBRAL ARTERY STENOSIS, UNSPECIFIED LATERALITY: ICD-10-CM

## 2024-06-17 DIAGNOSIS — I63.81 CEREBROVASCULAR ACCIDENT (CVA) OF LEFT THALAMUS (HCC): ICD-10-CM

## 2024-06-17 DIAGNOSIS — I65.22: ICD-10-CM

## 2024-06-17 RX ORDER — CLOPIDOGREL BISULFATE 75 MG/1
75 TABLET ORAL DAILY
Qty: 90 TABLET | Refills: 0 | Status: SHIPPED | OUTPATIENT
Start: 2024-06-17

## 2024-08-08 NOTE — PROGRESS NOTES
[facility]  Cardiac Electrophysiology Clinic Care Note                  []Initial visit     [x]Established visit     Patient Name: Vance Bee - :1945 - MRN:[id number]  Primary Cardiologist: Lazaro Khalil MD  Electrophysiologist: Narinder Larkin MD     Reason for visit: Pacemaker follow up    HPI:  Ms. Bee is a 78 y.o. female who presents for follow up, is s/p Medtronic dual chamber pacemaker (DOI 05/10/2024).    She reports doing well, denies chest pain, palpitations, SOB, lightheadedness, or edema.  Pacemaker site does have a suture protruding, but is otherwise well healed.    Nuclear stress test in 2024 showed LVEF 72% without ischemia.    Echo in 2023 showed LVEF 70-75% with mild LVH, mildly dilated LA, mild MAC at posterior leaflet of mitral valve with mild MR, & mild AR.    BP controlled.    Denies bleeding issues on DAPT.      Previous:  Medtronic dual chamber pacemaker (DOI 05/10/2024).    Admitted with confusion in 2024.  Profoundly anemic, but no occult blood noted in stool.  Received 1 unit PRBCs & started iron IV.  Declined gastro eval.      MRI showed small acute infarction left thalamus in 2023.     Thalamic CVA x 3.        Assessment and Plan       ICD-10-CM    1. Pacemaker  Z95.0       2. Primary hypertension  I10       3. 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  Device check today shows proper lead & generator function.  Generator longevity estimated 14.5 years.  RA 9.2%, RV <0.1%.  PSVT noted x 1 episode, 1 second duration.    Pacemaker site with protruding suture near lateral end, otherwise well healed.  Suture trimmed today.  Advised to call if she notes edema, erythema, drainage, or dehiscence.    HTN: BP controlled.  Continue current medication regimen.    Thalamic CVA: History x 3.  Continue DAPT for now.  Should AF/AFL be noted in the

## 2024-08-13 ENCOUNTER — OFFICE VISIT (OUTPATIENT)
Age: 79
End: 2024-08-13
Payer: MEDICARE

## 2024-08-13 ENCOUNTER — PROCEDURE VISIT (OUTPATIENT)
Age: 79
End: 2024-08-13
Payer: MEDICARE

## 2024-08-13 VITALS
HEART RATE: 90 BPM | DIASTOLIC BLOOD PRESSURE: 68 MMHG | WEIGHT: 143 LBS | HEIGHT: 60 IN | BODY MASS INDEX: 28.07 KG/M2 | OXYGEN SATURATION: 97 % | SYSTOLIC BLOOD PRESSURE: 144 MMHG

## 2024-08-13 DIAGNOSIS — Z95.0 CARDIAC PACEMAKER IN SITU: Primary | ICD-10-CM

## 2024-08-13 DIAGNOSIS — I63.81 THALAMIC STROKE (HCC): ICD-10-CM

## 2024-08-13 DIAGNOSIS — I10 PRIMARY HYPERTENSION: ICD-10-CM

## 2024-08-13 DIAGNOSIS — Z95.0 PACEMAKER: Primary | ICD-10-CM

## 2024-08-13 PROCEDURE — 1090F PRES/ABSN URINE INCON ASSESS: CPT | Performed by: NURSE PRACTITIONER

## 2024-08-13 PROCEDURE — 99214 OFFICE O/P EST MOD 30 MIN: CPT | Performed by: NURSE PRACTITIONER

## 2024-08-13 PROCEDURE — 3077F SYST BP >= 140 MM HG: CPT | Performed by: NURSE PRACTITIONER

## 2024-08-13 PROCEDURE — G8419 CALC BMI OUT NRM PARAM NOF/U: HCPCS | Performed by: NURSE PRACTITIONER

## 2024-08-13 PROCEDURE — 3078F DIAST BP <80 MM HG: CPT | Performed by: NURSE PRACTITIONER

## 2024-08-13 PROCEDURE — G8427 DOCREV CUR MEDS BY ELIG CLIN: HCPCS | Performed by: NURSE PRACTITIONER

## 2024-08-13 PROCEDURE — 1036F TOBACCO NON-USER: CPT | Performed by: NURSE PRACTITIONER

## 2024-08-13 PROCEDURE — 1123F ACP DISCUSS/DSCN MKR DOCD: CPT | Performed by: NURSE PRACTITIONER

## 2024-08-13 PROCEDURE — 93280 PM DEVICE PROGR EVAL DUAL: CPT | Performed by: INTERNAL MEDICINE

## 2024-08-13 PROCEDURE — G8400 PT W/DXA NO RESULTS DOC: HCPCS | Performed by: NURSE PRACTITIONER

## 2024-08-13 RX ORDER — DILTIAZEM HYDROCHLORIDE 240 MG/1
240 CAPSULE, COATED, EXTENDED RELEASE ORAL DAILY
Qty: 90 CAPSULE | Refills: 1 | Status: SHIPPED | OUTPATIENT
Start: 2024-08-13

## 2024-08-13 ASSESSMENT — PATIENT HEALTH QUESTIONNAIRE - PHQ9
2. FEELING DOWN, DEPRESSED OR HOPELESS: NOT AT ALL
1. LITTLE INTEREST OR PLEASURE IN DOING THINGS: NOT AT ALL
SUM OF ALL RESPONSES TO PHQ QUESTIONS 1-9: 0
SUM OF ALL RESPONSES TO PHQ9 QUESTIONS 1 & 2: 0
SUM OF ALL RESPONSES TO PHQ QUESTIONS 1-9: 0

## 2024-08-16 ENCOUNTER — OFFICE VISIT (OUTPATIENT)
Age: 79
End: 2024-08-16

## 2024-08-16 VITALS
SYSTOLIC BLOOD PRESSURE: 142 MMHG | DIASTOLIC BLOOD PRESSURE: 80 MMHG | WEIGHT: 143 LBS | OXYGEN SATURATION: 99 % | HEART RATE: 79 BPM | RESPIRATION RATE: 18 BRPM | HEIGHT: 60 IN | BODY MASS INDEX: 28.07 KG/M2

## 2024-08-16 DIAGNOSIS — Z86.79 HISTORY OF CARDIAC ARRHYTHMIA: ICD-10-CM

## 2024-08-16 DIAGNOSIS — I65.09 VERTEBRAL ARTERY STENOSIS, UNSPECIFIED LATERALITY: ICD-10-CM

## 2024-08-16 DIAGNOSIS — I65.22: ICD-10-CM

## 2024-08-16 DIAGNOSIS — Z87.898 HISTORY OF SYNCOPE: Primary | ICD-10-CM

## 2024-08-16 DIAGNOSIS — Z09 HOSPITAL DISCHARGE FOLLOW-UP: ICD-10-CM

## 2024-08-16 DIAGNOSIS — Z86.73 HISTORY OF CVA (CEREBROVASCULAR ACCIDENT): ICD-10-CM

## 2024-08-16 DIAGNOSIS — I63.81 CEREBROVASCULAR ACCIDENT (CVA) OF LEFT THALAMUS (HCC): ICD-10-CM

## 2024-08-16 RX ORDER — CLOPIDOGREL BISULFATE 75 MG/1
75 TABLET ORAL DAILY
Qty: 90 TABLET | Refills: 1 | Status: SHIPPED | OUTPATIENT
Start: 2024-08-16

## 2024-08-16 ASSESSMENT — PATIENT HEALTH QUESTIONNAIRE - PHQ9
SUM OF ALL RESPONSES TO PHQ QUESTIONS 1-9: 0
SUM OF ALL RESPONSES TO PHQ9 QUESTIONS 1 & 2: 0
1. LITTLE INTEREST OR PLEASURE IN DOING THINGS: NOT AT ALL
SUM OF ALL RESPONSES TO PHQ QUESTIONS 1-9: 0
2. FEELING DOWN, DEPRESSED OR HOPELESS: NOT AT ALL
SUM OF ALL RESPONSES TO PHQ QUESTIONS 1-9: 0
SUM OF ALL RESPONSES TO PHQ QUESTIONS 1-9: 0

## 2024-08-16 ASSESSMENT — ENCOUNTER SYMPTOMS: PHOTOPHOBIA: 0

## 2024-08-16 NOTE — PROGRESS NOTES
Chief Complaint   Patient presents with    Follow-Up from Hospital    Cerebrovascular Accident      Vitals:    08/16/24 1135   BP: (!) 142/80   Pulse: 79   Resp: 18   SpO2: 99%      
can be seen yearly here for her FU.    I spent 45 minutes of time today reviewing the medical record and notes, imaging, examining the patient, and face-to-face time, patient/family teaching and medication side effects, and time spent completing documentation.        Nicholas Ramos, APRN - NP

## 2024-09-25 ENCOUNTER — OFFICE VISIT (OUTPATIENT)
Age: 79
End: 2024-09-25
Payer: MEDICARE

## 2024-09-25 VITALS
HEIGHT: 60 IN | WEIGHT: 148.2 LBS | BODY MASS INDEX: 29.09 KG/M2 | OXYGEN SATURATION: 98 % | DIASTOLIC BLOOD PRESSURE: 62 MMHG | SYSTOLIC BLOOD PRESSURE: 118 MMHG | HEART RATE: 78 BPM

## 2024-09-25 DIAGNOSIS — I49.8 JUNCTIONAL RHYTHM: ICD-10-CM

## 2024-09-25 DIAGNOSIS — Z78.9 STATIN INTOLERANCE: ICD-10-CM

## 2024-09-25 DIAGNOSIS — I10 PRIMARY HYPERTENSION: Primary | ICD-10-CM

## 2024-09-25 DIAGNOSIS — I63.81 THALAMIC STROKE (HCC): ICD-10-CM

## 2024-09-25 DIAGNOSIS — I49.5 SINOATRIAL NODE DYSFUNCTION (HCC): ICD-10-CM

## 2024-09-25 DIAGNOSIS — Z95.0 PACEMAKER: ICD-10-CM

## 2024-09-25 DIAGNOSIS — I47.10 PSVT (PAROXYSMAL SUPRAVENTRICULAR TACHYCARDIA) (HCC): ICD-10-CM

## 2024-09-25 DIAGNOSIS — I44.1 WENCKEBACH: ICD-10-CM

## 2024-09-25 PROCEDURE — 1123F ACP DISCUSS/DSCN MKR DOCD: CPT | Performed by: SPECIALIST

## 2024-09-25 PROCEDURE — G8419 CALC BMI OUT NRM PARAM NOF/U: HCPCS | Performed by: SPECIALIST

## 2024-09-25 PROCEDURE — 1036F TOBACCO NON-USER: CPT | Performed by: SPECIALIST

## 2024-09-25 PROCEDURE — G8400 PT W/DXA NO RESULTS DOC: HCPCS | Performed by: SPECIALIST

## 2024-09-25 PROCEDURE — 1090F PRES/ABSN URINE INCON ASSESS: CPT | Performed by: SPECIALIST

## 2024-09-25 PROCEDURE — 3074F SYST BP LT 130 MM HG: CPT | Performed by: SPECIALIST

## 2024-09-25 PROCEDURE — G8427 DOCREV CUR MEDS BY ELIG CLIN: HCPCS | Performed by: SPECIALIST

## 2024-09-25 PROCEDURE — 3078F DIAST BP <80 MM HG: CPT | Performed by: SPECIALIST

## 2024-09-25 PROCEDURE — 99214 OFFICE O/P EST MOD 30 MIN: CPT | Performed by: SPECIALIST

## 2024-09-25 ASSESSMENT — PATIENT HEALTH QUESTIONNAIRE - PHQ9
SUM OF ALL RESPONSES TO PHQ QUESTIONS 1-9: 0
1. LITTLE INTEREST OR PLEASURE IN DOING THINGS: NOT AT ALL
SUM OF ALL RESPONSES TO PHQ QUESTIONS 1-9: 0
2. FEELING DOWN, DEPRESSED OR HOPELESS: NOT AT ALL
SUM OF ALL RESPONSES TO PHQ9 QUESTIONS 1 & 2: 0

## 2024-12-13 RX ORDER — DILTIAZEM HYDROCHLORIDE 240 MG/1
240 CAPSULE, COATED, EXTENDED RELEASE ORAL DAILY
Qty: 90 CAPSULE | Refills: 1 | Status: SHIPPED | OUTPATIENT
Start: 2024-12-13

## 2024-12-13 NOTE — TELEPHONE ENCOUNTER
Med refilled per VO by MD.    Future Appointments   Date Time Provider Department Center   8/4/2025 11:00 AM Nicholas Ramos APRN - NP [facility] AM[facility]   8/21/2025  1:20 PM Narinder Larkin MD [facility] AM[facility]   8/21/2025  1:20 PM [facility] AM[facility]

## 2025-01-24 ENCOUNTER — TELEPHONE (OUTPATIENT)
Age: 80
End: 2025-01-24

## 2025-01-24 NOTE — TELEPHONE ENCOUNTER
Pt called the office and she wanted to know when she should go and have  her MRA done and I informed her that it should be done some time soon in February.

## 2025-02-28 ENCOUNTER — TELEPHONE (OUTPATIENT)
Age: 80
End: 2025-02-28

## 2025-02-28 NOTE — TELEPHONE ENCOUNTER
2nd attempt,    I called and left a vm for the pt informing her that she has some imaging that is due to be done and I left the phone number to central scheduling on the vm and asked  her to give our office a call back after the imaging had been scheduled, so that we can make a f/u appt for her here in the office.2/28/2025

## 2025-02-28 NOTE — TELEPHONE ENCOUNTER
Pt called back and me and my co worker ,we gave her the information she needed to have her imaging done and the pt verbally understood the information that we gave her. And said she was going to call central, scheduling to have her imaging done soon. 2/28/2025.

## 2025-05-06 ENCOUNTER — CLINICAL DOCUMENTATION (OUTPATIENT)
Age: 80
End: 2025-05-06

## 2025-05-06 NOTE — PROGRESS NOTES
Received labs dated 04/23/2025.    TSH 1.770  Saman 14.9    Na 134  K 4.7  Glu 151  BUN 12  Cr 0.6  AST 14  ALT 12  Tbili 0.2  Hgb A1c 7.3    WBC 6.54  Hgb 10.7  Hct 34.8  Plt 221    Tchol 232  Tri 381  HDL 59  LDL 96.8

## 2025-05-07 ENCOUNTER — CLINICAL DOCUMENTATION (OUTPATIENT)
Age: 80
End: 2025-05-07

## 2025-05-07 NOTE — PROGRESS NOTES
PCP Dr. Aleman spoke with Dr. Larkin.  Patient's recent SBP at home and in office has been 160-180 and she requested medication recommendations.  Per Dr. Larkin, add metoprolol 50 mg daily for blood pressure control.

## 2025-06-04 ENCOUNTER — HOSPITAL ENCOUNTER (EMERGENCY)
Facility: HOSPITAL | Age: 80
Discharge: HOME | End: 2025-06-04
Attending: EMERGENCY MEDICINE
Payer: MEDICARE

## 2025-06-04 ENCOUNTER — APPOINTMENT (OUTPATIENT)
Facility: HOSPITAL | Age: 80
End: 2025-06-04
Payer: MEDICARE

## 2025-06-04 VITALS
TEMPERATURE: 98 F | DIASTOLIC BLOOD PRESSURE: 74 MMHG | HEIGHT: 60 IN | RESPIRATION RATE: 2 BRPM | OXYGEN SATURATION: 97 % | HEART RATE: 68 BPM | BODY MASS INDEX: 28.94 KG/M2 | SYSTOLIC BLOOD PRESSURE: 168 MMHG
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
## 2025-06-04 DIAGNOSIS — S00.11XA BLACK EYE OF RIGHT SIDE, INITIAL ENCOUNTER: ICD-10-CM

## 2025-06-04 DIAGNOSIS — S62.101A CLOSED FRACTURE OF RIGHT WRIST, INITIAL ENCOUNTER: ICD-10-CM

## 2025-06-04 PROCEDURE — 99284 EMERGENCY DEPT VISIT MOD MDM: CPT

## 2025-06-04 PROCEDURE — 73100 X-RAY EXAM OF WRIST: CPT

## 2025-06-04 PROCEDURE — 72125 CT NECK SPINE W/O DYE: CPT

## 2025-06-04 PROCEDURE — 29125 APPL SHORT ARM SPLINT STATIC: CPT

## 2025-06-04 PROCEDURE — 70450 CT HEAD/BRAIN W/O DYE: CPT

## 2025-06-04 PROCEDURE — 72131 CT LUMBAR SPINE W/O DYE: CPT

## 2025-06-04 ASSESSMENT — PAIN - FUNCTIONAL ASSESSMENT
PAIN_FUNCTIONAL_ASSESSMENT: 0-10
PAIN_FUNCTIONAL_ASSESSMENT: 0-10
PAIN_FUNCTIONAL_ASSESSMENT: PREVENTS OR INTERFERES SOME ACTIVE ACTIVITIES AND ADLS

## 2025-06-04 ASSESSMENT — PAIN DESCRIPTION - FREQUENCY: FREQUENCY: CONTINUOUS

## 2025-06-04 ASSESSMENT — PAIN DESCRIPTION - DESCRIPTORS: DESCRIPTORS: ACHING

## 2025-06-04 ASSESSMENT — PAIN DESCRIPTION - ONSET: ONSET: ON-GOING

## 2025-06-04 ASSESSMENT — PAIN DESCRIPTION - ORIENTATION: ORIENTATION: RIGHT

## 2025-06-04 ASSESSMENT — PAIN DESCRIPTION - LOCATION: LOCATION: ARM

## 2025-06-04 ASSESSMENT — PAIN DESCRIPTION - PAIN TYPE: TYPE: ACUTE PAIN

## 2025-06-04 ASSESSMENT — PAIN SCALES - GENERAL
PAINLEVEL_OUTOF10: 0
PAINLEVEL_OUTOF10: 4

## 2025-06-04 NOTE — ED TRIAGE NOTES
Patient arrives to ED via EMS from home after tripping and falling down her front steps. Pt states she hit her head on a flower pot/ the ground but did not lose consciousness. Pt presents with R eye bruising and swelling, + lower back pain, abrasion on R elbow and R wrist swelling.   Pt does have some R sided deficits on RUE from prior CVA but denies any new changes in strength, sensation, or speech. Pt is on plavix. HOB elevated and ice pack placed on eye on arrival.

## 2025-06-04 NOTE — ED PROVIDER NOTES
[facility] EMERGENCY DEPARTMENT  EMERGENCY DEPARTMENT ENCOUNTER      Pt Name: Vance Bee  MRN: [id number]  Birthdate 1945  Date of evaluation: 6/4/2025  Provider: Pancho Walter MD      HISTORY OF PRESENT ILLNESS      79-year-old female history of cancer, CVA, hypertension currently on Plavix but no blood thinner presents to the emergency department with daughter after a ground-level fall.  She reports that she was walking on the stairs and tripped and fell down the last 3 steps.  No loss of consciousness but she did hit her head and right wrist.    The history is provided by the patient, medical records and a relative.           Nursing Notes were reviewed.    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    Carotid bruit 4/12/2011    Chest discomfort 4/12/2011    CVA (cerebral vascular accident) ([facility]) 08/2023    Diabetes ([facility])     Fainting     GERD (gastroesophageal reflux disease)     High cholesterol     Hypertension     Neuropathy     Other ill-defined conditions(799.89)     celiac disease    Psychiatric disorder     depression/anxiety    Pure hypercholesterolemia 4/12/2011    Vertigo     Visual disturbance          SURGICAL HISTORY       Past Surgical History:   Procedure Laterality Date    APPENDECTOMY      EP DEVICE PROCEDURE N/A 5/10/2024    Insert PPM dual performed by Narinder Larkin MD at [facility] CARDIAC CATH LAB    GYN      tubal ligation    GYN      cyst removed from ovary at same time as appendectomy    HEENT      skin cancer removed from ear    HEENT      bilateral cataracts removed    HEENT      sinus surgery    INVASIVE VASCULAR N/A 5/10/2024    Venogram upper ext left performed by Narinder Larkin MD at [facility] CARDIAC CATH LAB    ORTHOPEDIC SURGERY      back    OTHER SURGICAL HISTORY      colonoscopy x 1 - hx

## 2025-06-23 RX ORDER — DILTIAZEM HYDROCHLORIDE 240 MG/1
240 CAPSULE, EXTENDED RELEASE ORAL DAILY
Qty: 90 CAPSULE | Refills: 0 | Status: SHIPPED | OUTPATIENT
Start: 2025-06-23

## 2025-06-23 NOTE — TELEPHONE ENCOUNTER
Request for diltiazem 240 mg.  Last office visit 8/13/24, next office visit 8/21/25. Refills per verbal order from Adeline Marc NP.

## 2025-07-18 ENCOUNTER — TRANSCRIBE ORDERS (OUTPATIENT)
Facility: HOSPITAL | Age: 80
End: 2025-07-18

## 2025-07-18 DIAGNOSIS — M81.0 AGE-RELATED OSTEOPOROSIS WITHOUT CURRENT PATHOLOGICAL FRACTURE: Primary | ICD-10-CM

## 2025-08-14 ENCOUNTER — HOSPITAL ENCOUNTER (OUTPATIENT)
Facility: HOSPITAL | Age: 80
Discharge: HOME | End: 2025-08-17
Attending: INTERNAL MEDICINE
Payer: MEDICARE

## 2025-08-14 DIAGNOSIS — M81.0 AGE-RELATED OSTEOPOROSIS WITHOUT CURRENT PATHOLOGICAL FRACTURE: ICD-10-CM

## 2025-08-14 PROCEDURE — 77080 DXA BONE DENSITY AXIAL: CPT

## 2025-08-25 ENCOUNTER — OFFICE VISIT (OUTPATIENT)
Age: 80
End: 2025-08-25
Payer: MEDICARE

## 2025-08-25 ENCOUNTER — PROCEDURE VISIT (OUTPATIENT)
Age: 80
End: 2025-08-25
Payer: MEDICARE

## 2025-08-25 VITALS
HEART RATE: 63 BPM | DIASTOLIC BLOOD PRESSURE: 68 MMHG | WEIGHT: 162 LBS | OXYGEN SATURATION: 97 % | HEIGHT: 60 IN | SYSTOLIC BLOOD PRESSURE: 182 MMHG | BODY MASS INDEX: 31.8 KG/M2

## 2025-08-25 DIAGNOSIS — I63.9 ACUTE CVA (CEREBROVASCULAR ACCIDENT) (HCC): ICD-10-CM

## 2025-08-25 DIAGNOSIS — I10 PRIMARY HYPERTENSION: ICD-10-CM

## 2025-08-25 DIAGNOSIS — Z95.0 CARDIAC PACEMAKER IN SITU: Primary | ICD-10-CM

## 2025-08-25 DIAGNOSIS — Z95.0 PACEMAKER: Primary | ICD-10-CM

## 2025-08-25 DIAGNOSIS — I49.5 SSS (SICK SINUS SYNDROME) (HCC): ICD-10-CM

## 2025-08-25 PROCEDURE — 1159F MED LIST DOCD IN RCRD: CPT

## 2025-08-25 PROCEDURE — 93010 ELECTROCARDIOGRAM REPORT: CPT

## 2025-08-25 PROCEDURE — G8417 CALC BMI ABV UP PARAM F/U: HCPCS

## 2025-08-25 PROCEDURE — G8427 DOCREV CUR MEDS BY ELIG CLIN: HCPCS

## 2025-08-25 PROCEDURE — 1126F AMNT PAIN NOTED NONE PRSNT: CPT

## 2025-08-25 PROCEDURE — 3078F DIAST BP <80 MM HG: CPT

## 2025-08-25 PROCEDURE — 99214 OFFICE O/P EST MOD 30 MIN: CPT

## 2025-08-25 PROCEDURE — 1160F RVW MEDS BY RX/DR IN RCRD: CPT

## 2025-08-25 PROCEDURE — G8399 PT W/DXA RESULTS DOCUMENT: HCPCS

## 2025-08-25 PROCEDURE — 1090F PRES/ABSN URINE INCON ASSESS: CPT

## 2025-08-25 PROCEDURE — 1036F TOBACCO NON-USER: CPT

## 2025-08-25 PROCEDURE — 1123F ACP DISCUSS/DSCN MKR DOCD: CPT

## 2025-08-25 PROCEDURE — 3077F SYST BP >= 140 MM HG: CPT

## 2025-08-25 PROCEDURE — 93005 ELECTROCARDIOGRAM TRACING: CPT

## 2025-08-25 RX ORDER — HYDROCHLOROTHIAZIDE 25 MG/1
12.5 TABLET ORAL EVERY MORNING
Qty: 45 TABLET | Refills: 1 | Status: SHIPPED | OUTPATIENT
Start: 2025-08-25 | End: 2025-08-29

## 2025-08-25 RX ORDER — METOPROLOL SUCCINATE 50 MG/1
TABLET, EXTENDED RELEASE ORAL
COMMUNITY
Start: 2025-06-03

## 2025-08-25 RX ORDER — CHOLECALCIFEROL (VITAMIN D3) 50 MCG
1 TABLET ORAL
COMMUNITY

## 2025-08-25 RX ORDER — GLIPIZIDE 5 MG/1
TABLET, FILM COATED, EXTENDED RELEASE ORAL EVERY 12 HOURS
COMMUNITY

## 2025-08-25 RX ORDER — ESCITALOPRAM OXALATE 10 MG/1
10 TABLET ORAL DAILY
COMMUNITY
Start: 2025-03-25

## 2025-08-25 ASSESSMENT — LIFESTYLE VARIABLES
HOW MANY STANDARD DRINKS CONTAINING ALCOHOL DO YOU HAVE ON A TYPICAL DAY: PATIENT DOES NOT DRINK
HOW OFTEN DO YOU HAVE A DRINK CONTAINING ALCOHOL: NEVER

## 2025-08-28 DIAGNOSIS — I49.5 SSS (SICK SINUS SYNDROME) (HCC): ICD-10-CM

## 2025-08-28 DIAGNOSIS — I10 PRIMARY HYPERTENSION: ICD-10-CM

## 2025-08-28 DIAGNOSIS — Z95.0 PACEMAKER: ICD-10-CM

## 2025-08-28 DIAGNOSIS — I63.9 ACUTE CVA (CEREBROVASCULAR ACCIDENT) (HCC): ICD-10-CM

## 2025-08-29 ENCOUNTER — RESULTS FOLLOW-UP (OUTPATIENT)
Age: 80
End: 2025-08-29

## 2025-08-29 DIAGNOSIS — R00.2 PALPITATIONS: ICD-10-CM

## 2025-08-29 DIAGNOSIS — I47.10 PSVT (PAROXYSMAL SUPRAVENTRICULAR TACHYCARDIA): Primary | ICD-10-CM

## 2025-08-29 DIAGNOSIS — I10 PRIMARY HYPERTENSION: ICD-10-CM

## 2025-08-29 LAB
ANION GAP SERPL CALC-SCNC: 12 MMOL/L (ref 2–14)
BUN SERPL-MCNC: 7 MG/DL (ref 8–23)
BUN/CREAT SERPL: 14 (ref 12–20)
CALCIUM SERPL-MCNC: 9.3 MG/DL (ref 8.8–10.2)
CHLORIDE SERPL-SCNC: 89 MMOL/L (ref 98–107)
CO2 SERPL-SCNC: 23 MMOL/L (ref 20–29)
CREAT SERPL-MCNC: 0.52 MG/DL (ref 0.6–1)
GLUCOSE SERPL-MCNC: 68 MG/DL (ref 65–100)
POTASSIUM SERPL-SCNC: 5.2 MMOL/L (ref 3.5–5.1)
SODIUM SERPL-SCNC: 123 MMOL/L (ref 136–145)

## (undated) DEVICE — AGENT HEMSTAT 3GM OXIDIZED REGENERATED CELOS ABSRB FOR CONT (ORDER MULTIPLES OF 5EA)

## (undated) DEVICE — DRESSING ANTIMIC FOAM OPTIFOAM POSTOP ADH 4 X 6 IN

## (undated) DEVICE — PACEMAKER PACK: Brand: MEDLINE INDUSTRIES, INC.

## (undated) DEVICE — APPLICATOR MEDICATED 26 CC SOLUTION HI LT ORNG CHLORAPREP

## (undated) DEVICE — MEDI-TRACE CADENCE ADULT, DEFIBRILLATION ELECTRODE -RTS  (10 PR/PK) - PHYSIO-CONTROL: Brand: MEDI-TRACE CADENCE

## (undated) DEVICE — INTRODUCER SHTH L13CM OD7FR SH ORNG HUB SEAMLESS SAFSHTH

## (undated) DEVICE — ELECTRODE PT RET AD L9FT HI MOIST COND ADH HYDRGEL CORDED

## (undated) DEVICE — SUTURE MONOCRYL + ABSORBABLE MONOFILAMENT 2-0 CT1 12 IN UD SXMP1B412

## (undated) DEVICE — 3M™ IOBAN™ 2 ANTIMICROBIAL INCISE DRAPE 6640EZ: Brand: IOBAN™ 2

## (undated) DEVICE — SUTURE MNFLMNT SH 26 MM 1/2 CI CRCLE TPR PNT SYMTRC PD PLU

## (undated) DEVICE — KIT INTRO 9FR L13CM DIA0.118IN SPLITTABLE HEMSTAT ROBUST

## (undated) DEVICE — LIQUIBAND RAPID ADHESIVE 36/CS 0.8ML: Brand: MEDLINE

## (undated) DEVICE — SUTURE ETHIBOND EXCEL SZ 2 L30IN NONABSORBABLE GRN L40MM V-37 MX69G